# Patient Record
Sex: FEMALE | Race: WHITE | NOT HISPANIC OR LATINO | Employment: FULL TIME | URBAN - METROPOLITAN AREA
[De-identification: names, ages, dates, MRNs, and addresses within clinical notes are randomized per-mention and may not be internally consistent; named-entity substitution may affect disease eponyms.]

---

## 2017-02-11 ENCOUNTER — LAB CONVERSION - ENCOUNTER (OUTPATIENT)
Dept: OTHER | Facility: OTHER | Age: 57
End: 2017-02-11

## 2017-02-11 LAB
BASOPHILS # BLD AUTO: 0.3 %
BASOPHILS # BLD AUTO: 22 CELLS/UL (ref 0–200)
DEPRECATED RDW RBC AUTO: 14.9 % (ref 11–15)
EOSINOPHIL # BLD AUTO: 0 %
EOSINOPHIL # BLD AUTO: 0 CELLS/UL (ref 15–500)
HCT VFR BLD AUTO: 39 % (ref 35–45)
HGB BLD-MCNC: 12.7 G/DL (ref 11.7–15.5)
LYMPHOCYTES # BLD AUTO: 1728 CELLS/UL (ref 850–3900)
LYMPHOCYTES # BLD AUTO: 24 %
MCH RBC QN AUTO: 25.7 PG (ref 27–33)
MCHC RBC AUTO-ENTMCNC: 32.6 G/DL (ref 32–36)
MCV RBC AUTO: 78.8 FL (ref 80–100)
MONOCYTES # BLD AUTO: 468 CELLS/UL (ref 200–950)
MONOCYTES (HISTORICAL): 6.5 %
NEUTROPHILS # BLD AUTO: 4982 CELLS/UL (ref 1500–7800)
NEUTROPHILS # BLD AUTO: 69.2 %
PLATELET # BLD AUTO: 237 THOUSAND/UL (ref 140–400)
PMV BLD AUTO: 8.6 FL (ref 7.5–12.5)
RBC # BLD AUTO: 4.95 MILLION/UL (ref 3.8–5.1)
WBC # BLD AUTO: 7.2 THOUSAND/UL (ref 3.8–10.8)

## 2017-02-12 ENCOUNTER — LAB CONVERSION - ENCOUNTER (OUTPATIENT)
Dept: OTHER | Facility: OTHER | Age: 57
End: 2017-02-12

## 2017-02-12 LAB
A/G RATIO (HISTORICAL): 1.6 (CALC) (ref 1–2.5)
ALBUMIN SERPL BCP-MCNC: 4.5 G/DL (ref 3.6–5.1)
ALP SERPL-CCNC: 78 U/L (ref 33–130)
ALT SERPL W P-5'-P-CCNC: 10 U/L (ref 6–29)
AST SERPL W P-5'-P-CCNC: 15 U/L (ref 10–35)
BASOPHILS # BLD AUTO: 0.3 %
BASOPHILS # BLD AUTO: 22 CELLS/UL (ref 0–200)
BILIRUB SERPL-MCNC: 0.6 MG/DL (ref 0.2–1.2)
BUN SERPL-MCNC: 19 MG/DL (ref 7–25)
BUN/CREA RATIO (HISTORICAL): 18 (CALC) (ref 6–22)
CALCIUM SERPL-MCNC: 9.6 MG/DL (ref 8.6–10.4)
CHLORIDE SERPL-SCNC: 101 MMOL/L (ref 98–110)
CO2 SERPL-SCNC: 27 MMOL/L (ref 20–31)
CREAT SERPL-MCNC: 1.06 MG/DL (ref 0.5–1.05)
DEPRECATED RDW RBC AUTO: 14.9 % (ref 11–15)
EGFR AFRICAN AMERICAN (HISTORICAL): 68 ML/MIN/1.73M2
EGFR-AMERICAN CALC (HISTORICAL): 59 ML/MIN/1.73M2
EOSINOPHIL # BLD AUTO: 0 %
EOSINOPHIL # BLD AUTO: 0 CELLS/UL (ref 15–500)
GAMMA GLOBULIN (HISTORICAL): 2.9 G/DL (CALC) (ref 1.9–3.7)
GLUCOSE (HISTORICAL): 119 MG/DL (ref 65–99)
HCT VFR BLD AUTO: 39 % (ref 35–45)
HGB BLD-MCNC: 12.7 G/DL (ref 11.7–15.5)
LYMPHOCYTES # BLD AUTO: 1728 CELLS/UL (ref 850–3900)
LYMPHOCYTES # BLD AUTO: 24 %
MCH RBC QN AUTO: 25.7 PG (ref 27–33)
MCHC RBC AUTO-ENTMCNC: 32.6 G/DL (ref 32–36)
MCV RBC AUTO: 78.8 FL (ref 80–100)
MONOCYTES # BLD AUTO: 468 CELLS/UL (ref 200–950)
MONOCYTES (HISTORICAL): 6.5 %
NEUTROPHILS # BLD AUTO: 4982 CELLS/UL (ref 1500–7800)
NEUTROPHILS # BLD AUTO: 69.2 %
PLATELET # BLD AUTO: 237 THOUSAND/UL (ref 140–400)
PMV BLD AUTO: 8.6 FL (ref 7.5–12.5)
POTASSIUM SERPL-SCNC: 4.1 MMOL/L (ref 3.5–5.3)
RBC # BLD AUTO: 4.95 MILLION/UL (ref 3.8–5.1)
SODIUM SERPL-SCNC: 138 MMOL/L (ref 135–146)
TOTAL PROTEIN (HISTORICAL): 7.4 G/DL (ref 6.1–8.1)
WBC # BLD AUTO: 7.2 THOUSAND/UL (ref 3.8–10.8)

## 2017-02-13 ENCOUNTER — LAB CONVERSION - ENCOUNTER (OUTPATIENT)
Dept: OTHER | Facility: OTHER | Age: 57
End: 2017-02-13

## 2017-02-13 ENCOUNTER — GENERIC CONVERSION - ENCOUNTER (OUTPATIENT)
Dept: OTHER | Facility: OTHER | Age: 57
End: 2017-02-13

## 2017-02-13 LAB
A/G RATIO (HISTORICAL): 1.6 (CALC) (ref 1–2.5)
ALBUMIN SERPL BCP-MCNC: 4.5 G/DL (ref 3.6–5.1)
ALP SERPL-CCNC: 78 U/L (ref 33–130)
ALT SERPL W P-5'-P-CCNC: 10 U/L (ref 6–29)
AST SERPL W P-5'-P-CCNC: 15 U/L (ref 10–35)
BASOPHILS # BLD AUTO: 0.3 %
BASOPHILS # BLD AUTO: 22 CELLS/UL (ref 0–200)
BILIRUB SERPL-MCNC: 0.6 MG/DL (ref 0.2–1.2)
BUN SERPL-MCNC: 19 MG/DL (ref 7–25)
BUN/CREA RATIO (HISTORICAL): 18 (CALC) (ref 6–22)
CA 125 (HISTORICAL): 7 U/ML
CA 15-3 (HISTORICAL): 28 U/ML
CA 27.29 (HISTORICAL): 59 U/ML
CALCIUM SERPL-MCNC: 9.6 MG/DL (ref 8.6–10.4)
CHLORIDE SERPL-SCNC: 101 MMOL/L (ref 98–110)
CO2 SERPL-SCNC: 27 MMOL/L (ref 20–31)
CREAT SERPL-MCNC: 1.06 MG/DL (ref 0.5–1.05)
CREATININE, RANDOM URINE (HISTORICAL): 104 MG/DL (ref 20–320)
DEPRECATED RDW RBC AUTO: 14.9 % (ref 11–15)
EGFR AFRICAN AMERICAN (HISTORICAL): 68 ML/MIN/1.73M2
EGFR-AMERICAN CALC (HISTORICAL): 59 ML/MIN/1.73M2
EOSINOPHIL # BLD AUTO: 0 %
EOSINOPHIL # BLD AUTO: 0 CELLS/UL (ref 15–500)
GAMMA GLOBULIN (HISTORICAL): 2.9 G/DL (CALC) (ref 1.9–3.7)
GLUCOSE (HISTORICAL): 119 MG/DL (ref 65–99)
HBA1C MFR BLD HPLC: 6.1 % OF TOTAL HGB
HCT VFR BLD AUTO: 39 % (ref 35–45)
HGB BLD-MCNC: 12.7 G/DL (ref 11.7–15.5)
LYMPHOCYTES # BLD AUTO: 1728 CELLS/UL (ref 850–3900)
LYMPHOCYTES # BLD AUTO: 24 %
MAGNESIUM, UR (HISTORICAL): 0.3 MG/DL
MCH RBC QN AUTO: 25.7 PG (ref 27–33)
MCHC RBC AUTO-ENTMCNC: 32.6 G/DL (ref 32–36)
MCV RBC AUTO: 78.8 FL (ref 80–100)
MICROALBUMIN/CREATININE RATIO (HISTORICAL): 3 MCG/MG CREAT
MONOCYTES # BLD AUTO: 468 CELLS/UL (ref 200–950)
MONOCYTES (HISTORICAL): 6.5 %
NEUTROPHILS # BLD AUTO: 4982 CELLS/UL (ref 1500–7800)
NEUTROPHILS # BLD AUTO: 69.2 %
PLATELET # BLD AUTO: 237 THOUSAND/UL (ref 140–400)
PMV BLD AUTO: 8.6 FL (ref 7.5–12.5)
POTASSIUM SERPL-SCNC: 4.1 MMOL/L (ref 3.5–5.3)
RBC # BLD AUTO: 4.95 MILLION/UL (ref 3.8–5.1)
SODIUM SERPL-SCNC: 138 MMOL/L (ref 135–146)
TOTAL PROTEIN (HISTORICAL): 7.4 G/DL (ref 6.1–8.1)
WBC # BLD AUTO: 7.2 THOUSAND/UL (ref 3.8–10.8)

## 2017-02-20 ENCOUNTER — ALLSCRIPTS OFFICE VISIT (OUTPATIENT)
Dept: OTHER | Facility: OTHER | Age: 57
End: 2017-02-20

## 2017-03-03 ENCOUNTER — GENERIC CONVERSION - ENCOUNTER (OUTPATIENT)
Dept: OTHER | Facility: OTHER | Age: 57
End: 2017-03-03

## 2017-03-09 ENCOUNTER — GENERIC CONVERSION - ENCOUNTER (OUTPATIENT)
Dept: OTHER | Facility: OTHER | Age: 57
End: 2017-03-09

## 2017-05-20 ENCOUNTER — GENERIC CONVERSION - ENCOUNTER (OUTPATIENT)
Dept: OTHER | Facility: OTHER | Age: 57
End: 2017-05-20

## 2017-05-20 ENCOUNTER — LAB CONVERSION - ENCOUNTER (OUTPATIENT)
Dept: OTHER | Facility: OTHER | Age: 57
End: 2017-05-20

## 2017-05-20 DIAGNOSIS — R97.8 OTHER ABNORMAL TUMOR MARKERS: ICD-10-CM

## 2017-05-20 DIAGNOSIS — M81.0 AGE-RELATED OSTEOPOROSIS WITHOUT CURRENT PATHOLOGICAL FRACTURE: ICD-10-CM

## 2017-05-20 DIAGNOSIS — C50.919 MALIGNANT NEOPLASM OF FEMALE BREAST (HCC): ICD-10-CM

## 2017-05-20 LAB
A/G RATIO (HISTORICAL): 1.4 (CALC) (ref 1–2.5)
A/G RATIO (HISTORICAL): 1.4 (CALC) (ref 1–2.5)
ALBUMIN SERPL BCP-MCNC: 4.2 G/DL (ref 3.6–5.1)
ALBUMIN SERPL BCP-MCNC: 4.2 G/DL (ref 3.6–5.1)
ALP SERPL-CCNC: 83 U/L (ref 33–130)
ALP SERPL-CCNC: 83 U/L (ref 33–130)
ALT SERPL W P-5'-P-CCNC: 14 U/L (ref 6–29)
ALT SERPL W P-5'-P-CCNC: 14 U/L (ref 6–29)
AST SERPL W P-5'-P-CCNC: 16 U/L (ref 10–35)
AST SERPL W P-5'-P-CCNC: 16 U/L (ref 10–35)
BASOPHILS # BLD AUTO: 0.6 %
BASOPHILS # BLD AUTO: 0.6 %
BASOPHILS # BLD AUTO: 35 CELLS/UL (ref 0–200)
BASOPHILS # BLD AUTO: 35 CELLS/UL (ref 0–200)
BILIRUB SERPL-MCNC: 0.4 MG/DL (ref 0.2–1.2)
BILIRUB SERPL-MCNC: 0.4 MG/DL (ref 0.2–1.2)
BUN SERPL-MCNC: 24 MG/DL (ref 7–25)
BUN SERPL-MCNC: 24 MG/DL (ref 7–25)
BUN/CREA RATIO (HISTORICAL): 22 (CALC) (ref 6–22)
BUN/CREA RATIO (HISTORICAL): 22 (CALC) (ref 6–22)
CA 125 (HISTORICAL): 5 U/ML
CA 15-3 (HISTORICAL): 17 U/ML
CA 27.29 (HISTORICAL): 35 U/ML
CALCIUM SERPL-MCNC: 9.6 MG/DL (ref 8.6–10.4)
CALCIUM SERPL-MCNC: 9.6 MG/DL (ref 8.6–10.4)
CHLORIDE SERPL-SCNC: 100 MMOL/L (ref 98–110)
CHLORIDE SERPL-SCNC: 100 MMOL/L (ref 98–110)
CHOLEST SERPL-MCNC: 215 MG/DL (ref 125–200)
CHOLEST SERPL-MCNC: 215 MG/DL (ref 125–200)
CHOLEST/HDLC SERPL: 3.6 (CALC)
CHOLEST/HDLC SERPL: 3.6 (CALC)
CO2 SERPL-SCNC: 26 MMOL/L (ref 20–31)
CO2 SERPL-SCNC: 26 MMOL/L (ref 20–31)
CREAT SERPL-MCNC: 1.11 MG/DL (ref 0.5–1.05)
CREAT SERPL-MCNC: 1.11 MG/DL (ref 0.5–1.05)
DEPRECATED RDW RBC AUTO: 15.8 % (ref 11–15)
DEPRECATED RDW RBC AUTO: 15.8 % (ref 11–15)
EGFR AFRICAN AMERICAN (HISTORICAL): 65 ML/MIN/1.73M2
EGFR AFRICAN AMERICAN (HISTORICAL): 65 ML/MIN/1.73M2
EGFR-AMERICAN CALC (HISTORICAL): 56 ML/MIN/1.73M2
EGFR-AMERICAN CALC (HISTORICAL): 56 ML/MIN/1.73M2
EOSINOPHIL # BLD AUTO: 0 %
EOSINOPHIL # BLD AUTO: 0 %
EOSINOPHIL # BLD AUTO: 0 CELLS/UL (ref 15–500)
EOSINOPHIL # BLD AUTO: 0 CELLS/UL (ref 15–500)
GAMMA GLOBULIN (HISTORICAL): 2.9 G/DL (CALC) (ref 1.9–3.7)
GAMMA GLOBULIN (HISTORICAL): 2.9 G/DL (CALC) (ref 1.9–3.7)
GLUCOSE (HISTORICAL): 145 MG/DL (ref 65–99)
GLUCOSE (HISTORICAL): 145 MG/DL (ref 65–99)
HBA1C MFR BLD HPLC: 5.7 % OF TOTAL HGB
HBA1C MFR BLD HPLC: 5.7 % OF TOTAL HGB
HCT VFR BLD AUTO: 37.5 % (ref 35–45)
HCT VFR BLD AUTO: 37.5 % (ref 35–45)
HDLC SERPL-MCNC: 59 MG/DL
HDLC SERPL-MCNC: 59 MG/DL
HGB BLD-MCNC: 12.6 G/DL (ref 11.7–15.5)
HGB BLD-MCNC: 12.6 G/DL (ref 11.7–15.5)
LDL CHOLESTEROL (HISTORICAL): 137 MG/DL (CALC)
LDL CHOLESTEROL (HISTORICAL): 137 MG/DL (CALC)
LYMPHOCYTES # BLD AUTO: 1456 CELLS/UL (ref 850–3900)
LYMPHOCYTES # BLD AUTO: 1456 CELLS/UL (ref 850–3900)
LYMPHOCYTES # BLD AUTO: 25.1 %
LYMPHOCYTES # BLD AUTO: 25.1 %
MCH RBC QN AUTO: 26.2 PG (ref 27–33)
MCH RBC QN AUTO: 26.2 PG (ref 27–33)
MCHC RBC AUTO-ENTMCNC: 33.6 G/DL (ref 32–36)
MCHC RBC AUTO-ENTMCNC: 33.6 G/DL (ref 32–36)
MCV RBC AUTO: 78 FL (ref 80–100)
MCV RBC AUTO: 78 FL (ref 80–100)
MONOCYTES # BLD AUTO: 499 CELLS/UL (ref 200–950)
MONOCYTES # BLD AUTO: 499 CELLS/UL (ref 200–950)
MONOCYTES (HISTORICAL): 8.6 %
MONOCYTES (HISTORICAL): 8.6 %
NEUTROPHILS # BLD AUTO: 3811 CELLS/UL (ref 1500–7800)
NEUTROPHILS # BLD AUTO: 3811 CELLS/UL (ref 1500–7800)
NEUTROPHILS # BLD AUTO: 65.7 %
NEUTROPHILS # BLD AUTO: 65.7 %
NON-HDL-CHOL (CHOL-HDL) (HISTORICAL): 156 MG/DL (CALC)
NON-HDL-CHOL (CHOL-HDL) (HISTORICAL): 156 MG/DL (CALC)
PLATELET # BLD AUTO: 223 THOUSAND/UL (ref 140–400)
PLATELET # BLD AUTO: 223 THOUSAND/UL (ref 140–400)
PMV BLD AUTO: 7.8 FL (ref 7.5–12.5)
PMV BLD AUTO: 7.8 FL (ref 7.5–12.5)
POTASSIUM SERPL-SCNC: 4.4 MMOL/L (ref 3.5–5.3)
POTASSIUM SERPL-SCNC: 4.4 MMOL/L (ref 3.5–5.3)
RBC # BLD AUTO: 4.81 MILLION/UL (ref 3.8–5.1)
RBC # BLD AUTO: 4.81 MILLION/UL (ref 3.8–5.1)
SODIUM SERPL-SCNC: 136 MMOL/L (ref 135–146)
SODIUM SERPL-SCNC: 136 MMOL/L (ref 135–146)
TOTAL PROTEIN (HISTORICAL): 7.1 G/DL (ref 6.1–8.1)
TOTAL PROTEIN (HISTORICAL): 7.1 G/DL (ref 6.1–8.1)
TRIGL SERPL-MCNC: 97 MG/DL
TRIGL SERPL-MCNC: 97 MG/DL
WBC # BLD AUTO: 5.8 THOUSAND/UL (ref 3.8–10.8)
WBC # BLD AUTO: 5.8 THOUSAND/UL (ref 3.8–10.8)

## 2017-05-22 ENCOUNTER — ALLSCRIPTS OFFICE VISIT (OUTPATIENT)
Dept: OTHER | Facility: OTHER | Age: 57
End: 2017-05-22

## 2017-05-26 ENCOUNTER — GENERIC CONVERSION - ENCOUNTER (OUTPATIENT)
Dept: OTHER | Facility: OTHER | Age: 57
End: 2017-05-26

## 2017-08-28 ENCOUNTER — ALLSCRIPTS OFFICE VISIT (OUTPATIENT)
Dept: OTHER | Facility: OTHER | Age: 57
End: 2017-08-28

## 2017-08-28 ENCOUNTER — LAB CONVERSION - ENCOUNTER (OUTPATIENT)
Dept: OTHER | Facility: OTHER | Age: 57
End: 2017-08-28

## 2017-08-28 ENCOUNTER — GENERIC CONVERSION - ENCOUNTER (OUTPATIENT)
Dept: OTHER | Facility: OTHER | Age: 57
End: 2017-08-28

## 2017-08-28 LAB
A/G RATIO (HISTORICAL): 1.6 (CALC) (ref 1–2.5)
A/G RATIO (HISTORICAL): 1.6 (CALC) (ref 1–2.5)
ALBUMIN SERPL BCP-MCNC: 4.3 G/DL (ref 3.6–5.1)
ALBUMIN SERPL BCP-MCNC: 4.3 G/DL (ref 3.6–5.1)
ALP SERPL-CCNC: 77 U/L (ref 33–130)
ALP SERPL-CCNC: 77 U/L (ref 33–130)
ALT SERPL W P-5'-P-CCNC: 14 U/L (ref 6–29)
ALT SERPL W P-5'-P-CCNC: 14 U/L (ref 6–29)
AST SERPL W P-5'-P-CCNC: 16 U/L (ref 10–35)
AST SERPL W P-5'-P-CCNC: 16 U/L (ref 10–35)
BASOPHILS # BLD AUTO: 0.3 %
BASOPHILS # BLD AUTO: 0.3 %
BASOPHILS # BLD AUTO: 21 CELLS/UL (ref 0–200)
BASOPHILS # BLD AUTO: 21 CELLS/UL (ref 0–200)
BILIRUB SERPL-MCNC: 0.4 MG/DL (ref 0.2–1.2)
BILIRUB SERPL-MCNC: 0.4 MG/DL (ref 0.2–1.2)
BUN SERPL-MCNC: 24 MG/DL (ref 7–25)
BUN SERPL-MCNC: 24 MG/DL (ref 7–25)
BUN/CREA RATIO (HISTORICAL): 22 (CALC) (ref 6–22)
BUN/CREA RATIO (HISTORICAL): 22 (CALC) (ref 6–22)
CA 125 (HISTORICAL): 6 U/ML
CA 15-3 (HISTORICAL): 19 U/ML
CALCIUM SERPL-MCNC: 9.8 MG/DL (ref 8.6–10.4)
CALCIUM SERPL-MCNC: 9.8 MG/DL (ref 8.6–10.4)
CHLORIDE SERPL-SCNC: 100 MMOL/L (ref 98–110)
CHLORIDE SERPL-SCNC: 100 MMOL/L (ref 98–110)
CHOLEST SERPL-MCNC: 188 MG/DL
CHOLEST SERPL-MCNC: 188 MG/DL
CHOLEST/HDLC SERPL: 3.9 (CALC)
CHOLEST/HDLC SERPL: 3.9 (CALC)
CO2 SERPL-SCNC: 29 MMOL/L (ref 20–31)
CO2 SERPL-SCNC: 29 MMOL/L (ref 20–31)
CREAT SERPL-MCNC: 1.07 MG/DL (ref 0.5–1.05)
CREAT SERPL-MCNC: 1.07 MG/DL (ref 0.5–1.05)
DEPRECATED RDW RBC AUTO: 14 % (ref 11–15)
DEPRECATED RDW RBC AUTO: 14 % (ref 11–15)
EGFR AFRICAN AMERICAN (HISTORICAL): 68 ML/MIN/1.73M2
EGFR AFRICAN AMERICAN (HISTORICAL): 68 ML/MIN/1.73M2
EGFR-AMERICAN CALC (HISTORICAL): 58 ML/MIN/1.73M2
EGFR-AMERICAN CALC (HISTORICAL): 58 ML/MIN/1.73M2
EOSINOPHIL # BLD AUTO: 0 %
EOSINOPHIL # BLD AUTO: 0 %
EOSINOPHIL # BLD AUTO: 0 CELLS/UL (ref 15–500)
EOSINOPHIL # BLD AUTO: 0 CELLS/UL (ref 15–500)
GAMMA GLOBULIN (HISTORICAL): 2.7 G/DL (CALC) (ref 1.9–3.7)
GAMMA GLOBULIN (HISTORICAL): 2.7 G/DL (CALC) (ref 1.9–3.7)
GLUCOSE (HISTORICAL): 137 MG/DL (ref 65–99)
GLUCOSE (HISTORICAL): 137 MG/DL (ref 65–99)
HBA1C MFR BLD HPLC: 5.9 % OF TOTAL HGB
HBA1C MFR BLD HPLC: 5.9 % OF TOTAL HGB
HCT VFR BLD AUTO: 39.8 % (ref 35–45)
HCT VFR BLD AUTO: 39.8 % (ref 35–45)
HDLC SERPL-MCNC: 48 MG/DL
HDLC SERPL-MCNC: 48 MG/DL
HGB BLD-MCNC: 13 G/DL (ref 11.7–15.5)
HGB BLD-MCNC: 13 G/DL (ref 11.7–15.5)
LDL CHOLESTEROL (HISTORICAL): 111 MG/DL (CALC)
LDL CHOLESTEROL (HISTORICAL): 111 MG/DL (CALC)
LYMPHOCYTES # BLD AUTO: 1605 CELLS/UL (ref 850–3900)
LYMPHOCYTES # BLD AUTO: 1605 CELLS/UL (ref 850–3900)
LYMPHOCYTES # BLD AUTO: 22.6 %
LYMPHOCYTES # BLD AUTO: 22.6 %
MCH RBC QN AUTO: 26.3 PG (ref 27–33)
MCH RBC QN AUTO: 26.3 PG (ref 27–33)
MCHC RBC AUTO-ENTMCNC: 32.7 G/DL (ref 32–36)
MCHC RBC AUTO-ENTMCNC: 32.7 G/DL (ref 32–36)
MCV RBC AUTO: 80.6 FL (ref 80–100)
MCV RBC AUTO: 80.6 FL (ref 80–100)
MONOCYTES # BLD AUTO: 582 CELLS/UL (ref 200–950)
MONOCYTES # BLD AUTO: 582 CELLS/UL (ref 200–950)
MONOCYTES (HISTORICAL): 8.2 %
MONOCYTES (HISTORICAL): 8.2 %
NEUTROPHILS # BLD AUTO: 4892 CELLS/UL (ref 1500–7800)
NEUTROPHILS # BLD AUTO: 4892 CELLS/UL (ref 1500–7800)
NEUTROPHILS # BLD AUTO: 68.9 %
NEUTROPHILS # BLD AUTO: 68.9 %
NON-HDL-CHOL (CHOL-HDL) (HISTORICAL): 140 MG/DL (CALC)
NON-HDL-CHOL (CHOL-HDL) (HISTORICAL): 140 MG/DL (CALC)
PLATELET # BLD AUTO: 207 THOUSAND/UL (ref 140–400)
PLATELET # BLD AUTO: 207 THOUSAND/UL (ref 140–400)
PMV BLD AUTO: 9.9 FL (ref 7.5–12.5)
PMV BLD AUTO: 9.9 FL (ref 7.5–12.5)
POTASSIUM SERPL-SCNC: 4.7 MMOL/L (ref 3.5–5.3)
POTASSIUM SERPL-SCNC: 4.7 MMOL/L (ref 3.5–5.3)
RBC # BLD AUTO: 4.94 MILLION/UL (ref 3.8–5.1)
RBC # BLD AUTO: 4.94 MILLION/UL (ref 3.8–5.1)
SODIUM SERPL-SCNC: 140 MMOL/L (ref 135–146)
SODIUM SERPL-SCNC: 140 MMOL/L (ref 135–146)
TOTAL PROTEIN (HISTORICAL): 7 G/DL (ref 6.1–8.1)
TOTAL PROTEIN (HISTORICAL): 7 G/DL (ref 6.1–8.1)
TRIGL SERPL-MCNC: 171 MG/DL
TRIGL SERPL-MCNC: 171 MG/DL
WBC # BLD AUTO: 7.1 THOUSAND/UL (ref 3.8–10.8)
WBC # BLD AUTO: 7.1 THOUSAND/UL (ref 3.8–10.8)

## 2017-08-29 ENCOUNTER — LAB CONVERSION - ENCOUNTER (OUTPATIENT)
Dept: OTHER | Facility: OTHER | Age: 57
End: 2017-08-29

## 2017-08-29 ENCOUNTER — GENERIC CONVERSION - ENCOUNTER (OUTPATIENT)
Dept: OTHER | Facility: OTHER | Age: 57
End: 2017-08-29

## 2017-08-29 LAB
CA 125 (HISTORICAL): 6 U/ML
CA 15-3 (HISTORICAL): 19 U/ML
CA 27.29 (HISTORICAL): 27 U/ML

## 2017-09-22 DIAGNOSIS — R97.8 OTHER ABNORMAL TUMOR MARKERS: ICD-10-CM

## 2017-09-22 DIAGNOSIS — C50.919 MALIGNANT NEOPLASM OF FEMALE BREAST (HCC): ICD-10-CM

## 2017-10-19 ENCOUNTER — GENERIC CONVERSION - ENCOUNTER (OUTPATIENT)
Dept: OTHER | Facility: OTHER | Age: 57
End: 2017-10-19

## 2017-11-11 ENCOUNTER — ALLSCRIPTS OFFICE VISIT (OUTPATIENT)
Dept: OTHER | Facility: OTHER | Age: 57
End: 2017-11-11

## 2017-11-13 NOTE — PROGRESS NOTES
Assessment    1  Epigastric pain (789 06) (R10 13)   2  Abdominal bloating (787 3) (R14 0)   3  Never a smoker   4  BMI 22 0-22 9, adult (V85 1) (Z68 22)   5  Type 2 diabetes with ophthalmic manifestation (250 50) (E11 39)    Plan  Abdominal bloating, Epigastric pain    · (1) AMYLASE; Status:Active; Requested for:11Nov2017;    · (1) COMPREHENSIVE METABOLIC PANEL; Status:Active; Requested for:11Nov2017;    · (1) LIPASE; Status:Active; Requested for:11Nov2017;   Epigastric pain    · Omeprazole 40 MG Oral Capsule Delayed Release; TAKE 1 CAPSULE Daily 1hour prior to meal    Discussion/Summary    Labs ordered  Will trial Omeprazole  Consider abdominal US if pain persists  GI if no improvement, also due for colonoscopy  Advised on bland diet and dietary restrictions  Possible side effects of new medications were reviewed with the patient/guardian today  The treatment plan was reviewed with the patient/guardian  The patient/guardian understands and agrees with the treatment plan      Chief Complaint  ptc/o stomach pain on and off since Thursday  ac/ma      History of Present Illness  HPI: She has been experiencing epigastric pain and upper stomach bloating for the past couple of months  has been experiencing horrible pains in the same area for the past 2-3 days  They come and go, made worse with hunger  Denies fevers, n/v/d, changes in bowel habits  She has been eating tomato sauce more frequently, and wonder if this is contributing to symptoms  on Prilosec years ago for reflux symptoms, had upper GI series at this time  about pancreatitis given DM medications  been taking Mylanta and Tums which do help  for colonoscopy with Dr Leesa Morillo days/week      Review of Systems   Constitutional: no fever-- and-- no chills  Cardiovascular: no chest pain  Respiratory: no shortness of breath-- and-- no cough  Gastrointestinal: abdominal pain, but-- no nausea,-- no vomiting,-- no diarrhea-- and-- no blood in stools        Active Problems  1  Abnormal tumor markers (795 89) (R97 8)   2  Adenocarcinoma of breast, unspecified laterality (174 9) (C50 919)   3  Allergic rhinitis (477 9) (J30 9)   4  Anemia (285 9) (D64 9)   5  Anxiety (300 00) (F41 9)   6  Benign essential hypertension (401 1) (I10)   7  Breast cancer (174 9) (C50 919)   8  Diabetic Cataract (366 41)   9  Diabetic retinopathy (250 50,362 01) (E11 319)   10  Encounter for screening colonoscopy (V76 51) (Z12 11)   11  Ganglion of right wrist (727 41) (M67 431)   12  H/O mastectomy (V45 71) (Z90 10)   13  Mixed hyperlipidemia (272 2) (E78 2)   14  On angiotensin-converting enzyme (ACE) inhibitors (V07 52) (Z79 899)   15  Osteoporosis (733 00) (M81 0)   16  Palpitations (785 1) (R00 2)   17  Pruritus (698 9) (L29 9)   18  Refusal of statin medication by patient (V64 2) (Z53 29)   19  Type 2 diabetes with ophthalmic manifestation (250 50) (E11 39)   20  Varicose veins without complication (294 1) (C05 41)   21  Well adult on routine health check (V70 0) (Z00 00)    Past Medical History  1  History of Abnormal Liver Function Test (790 6)   2  Acute upper respiratory infection (465 9) (J06 9)   3  History of Acute upper respiratory infection (465 9) (J06 9)   4  History of Breast Cancer (V10 3)   5  History of Cough (786 2) (R05)   6  History of Diabetes Mellitus (250 00)   7  History of acute sinusitis (V12 69) (Z87 09)   8  History of diarrhea (V12 79) (Z87 898)   9  History of hypertension (V12 59) (Z86 79)  Active Problems And Past Medical History Reviewed: The active problems and past medical history were reviewed and updated today  Family History  Father    1  Family history of hypertension (V17 49) (Z82 49)  Family History    2  Family history of Diabetes Mellitus (V18 0)   3  Family history of Heart Disease (V17 49)    Social History   · Being A Social Drinker   · Never a smoker  The social history was reviewed and is unchanged  Surgical History    1   History of Breast Surgery Lumpectomy   2  History of Breast Surgery Mastectomy   3  History of Oral Surgery Tooth Extraction  Surgical History Reviewed: The surgical history was reviewed and updated today  Current Meds   1  Amlodipine-Olmesartan 5-40 MG Oral Tablet; take  a half tablet once a day; Therapy: 72TUW7244 to (Evaluate:95Lsz2781)  Requested for: 32Mxj0415; Last Rx:82Jch5465 Ordered   2  Aspirin 81 MG TABS; 1 every day; Therapy: 22FUM4685 to  Requested for: 28Aug2017 Recorded   3  BL Glucosamine-Chondroitin TABS; Therapy: (Recorded:11Nov2017) to Recorded   4  Bystolic 5 MG Oral Tablet; 1 every day; Therapy: 87AHU9308 to (Evaluate:24Feb2018)  Requested for: 28Aug2017; Last Rx:25Xwj8022 Ordered   5  Fluticasone Propionate 50 MCG/ACT Nasal Suspension; USE 2 SPRAYS IN EACH NOSTRIL DAILY; Therapy: 87IMG6544 to (Last Rx:02Nov2017)  Requested for: 15KFE0378 Ordered   6  Glucocard Vital Test In Vitro Strip; TEST 5 TIMES DAILY; Therapy: 88IKZ6689 to (Last Rx:10Oct2017)  Requested for: 06Uob6075 Ordered   7  Kombiglyze XR 5-1000 MG Oral Tablet Extended Release 24 Hour; Take 1 tablet by mouth  every day; Therapy: 31WOB7624 to (Evaluate:24Feb2018)  Requested for: 64Ffy4795; Last Rx:72Auk7936 Ordered   8  Letrozole 2 5 MG Oral Tablet; Take 1 tablet daily  Requested for: 67OBV4717; Last Rx:47Hrn8273; Status: ACTIVE - Retrospective By Protocol Authorization Ordered   9  Multivitamins TABS; 1 Every Day; Therapy: 58WRX1232 to  Requested for: 14OZA9763 Recorded   10  Pravastatin Sodium 10 MG Oral Tablet; TAKE 1 TABLET DAILY; Therapy: 61ECQ7400 to (Evaluate:24Feb2018)  Requested for: 60Bze9609; Last  Rx:28Aug2017 Ordered   11  Probiotic Daily CAPS; Therapy: (Recorded:16Nov2014) to Recorded    The medication list was reviewed and updated today  Allergies  1  Codeine Derivatives   2  Erythromycin TABS   3  Penicillins   4   Sulfa Drugs    Vitals   Recorded: 93RQC2266 11:42AM   Temperature 97 6 F   Heart Rate 76   Respiration 16   Systolic 367   Diastolic 74   Height 5 ft 3 in   Weight 125 lb    BMI Calculated 22 14   BSA Calculated 1 58       Physical Exam   Constitutional  General appearance: No acute distress, well appearing and well nourished  Eyes  Conjunctiva and lids: No swelling, erythema or discharge  Ears, Nose, Mouth, and Throat  Otoscopic examination: Tympanic membranes translucent with normal light reflex  Canals patent without erythema  Nasal mucosa, septum, and turbinates: Normal without edema or erythema  Oropharynx: Normal with no erythema, edema, exudate or lesions  Pulmonary  Respiratory effort: No increased work of breathing or signs of respiratory distress  Auscultation of lungs: Clear to auscultation  Cardiovascular  Auscultation of heart: Normal rate and rhythm, normal S1 and S2, without murmurs  Examination of extremities for edema and/or varicosities: Normal    Abdomen  Abdomen: Abnormal   The abdomen was flat  Bowel sounds were normal  There was mild tenderness in the epigastric area  Liver and spleen: No hepatomegaly or splenomegaly  Lymphatic  Palpation of lymph nodes in neck: No lymphadenopathy  Skin  Skin and subcutaneous tissue: Normal without rashes or lesions  Psychiatric  Mood and affect: Normal          Attending Note  Collaborating Physician Note: Collaborating Note: I agree with the Advanced Practitioner note        Future Appointments    Date/Time Provider Specialty Site   01/29/2018 02:00 PM Shannan Higgins[address]   01/29/2018 03:30 PM Deborah Shay MD Hematology Oncology [facility]       Signatures   Electronically signed by : Elysia Milan; Nov 11 2017 12:11PM EST                       (Author)    Electronically signed by : Raissa Minor DO; Nov 13 2017 12:12AM EST                       (Author)

## 2017-11-16 ENCOUNTER — LAB CONVERSION - ENCOUNTER (OUTPATIENT)
Dept: OTHER | Facility: OTHER | Age: 57
End: 2017-11-16

## 2017-11-16 DIAGNOSIS — R14.0 ABDOMINAL DISTENSION (GASEOUS): ICD-10-CM

## 2017-11-16 DIAGNOSIS — R10.13 EPIGASTRIC PAIN: ICD-10-CM

## 2017-11-16 LAB
A/G RATIO (HISTORICAL): 1.6 (CALC) (ref 1–2.5)
ALBUMIN SERPL BCP-MCNC: 4.4 G/DL (ref 3.6–5.1)
ALP SERPL-CCNC: 81 U/L (ref 33–130)
ALT SERPL W P-5'-P-CCNC: 14 U/L (ref 6–29)
AMYLASE (HISTORICAL): 59 U/L (ref 21–101)
AST SERPL W P-5'-P-CCNC: 16 U/L (ref 10–35)
BILIRUB SERPL-MCNC: 0.3 MG/DL (ref 0.2–1.2)
BUN SERPL-MCNC: 21 MG/DL (ref 7–25)
BUN/CREA RATIO (HISTORICAL): 19 (CALC) (ref 6–22)
CALCIUM SERPL-MCNC: 9.6 MG/DL (ref 8.6–10.4)
CHLORIDE SERPL-SCNC: 100 MMOL/L (ref 98–110)
CO2 SERPL-SCNC: 28 MMOL/L (ref 20–31)
CREAT SERPL-MCNC: 1.08 MG/DL (ref 0.5–1.05)
EGFR AFRICAN AMERICAN (HISTORICAL): 66 ML/MIN/1.73M2
EGFR-AMERICAN CALC (HISTORICAL): 57 ML/MIN/1.73M2
GAMMA GLOBULIN (HISTORICAL): 2.8 G/DL (CALC) (ref 1.9–3.7)
GLUCOSE (HISTORICAL): 125 MG/DL (ref 65–99)
LIPASE SERPL-CCNC: 73 U/L (ref 7–60)
POTASSIUM SERPL-SCNC: 4.6 MMOL/L (ref 3.5–5.3)
SODIUM SERPL-SCNC: 137 MMOL/L (ref 135–146)
TOTAL PROTEIN (HISTORICAL): 7.2 G/DL (ref 6.1–8.1)

## 2017-11-25 ENCOUNTER — HOSPITAL ENCOUNTER (OUTPATIENT)
Dept: ULTRASOUND IMAGING | Facility: HOSPITAL | Age: 57
Discharge: HOME | End: 2017-11-25
Payer: COMMERCIAL

## 2017-11-25 DIAGNOSIS — R10.13 EPIGASTRIC PAIN: ICD-10-CM

## 2017-11-25 DIAGNOSIS — R14.0 ABDOMINAL DISTENSION (GASEOUS): ICD-10-CM

## 2017-11-25 PROCEDURE — 76700 US EXAM ABDOM COMPLETE: CPT

## 2017-11-28 ENCOUNTER — GENERIC CONVERSION - ENCOUNTER (OUTPATIENT)
Dept: OTHER | Facility: OTHER | Age: 57
End: 2017-11-28

## 2018-01-09 NOTE — RESULT NOTES
Discussion/Summary   Saida- please schedule your ultrasound and have labs repeated in January  DELFINA Mcraelillie     Verified Results  (1) COMPREHENSIVE METABOLIC PANEL 39SFQ2751 56:50VZ Grace Alexander     Test Name Result Flag Reference   GLUCOSE 125 mg/dL H 65-99   Fasting reference interval     For someone without known diabetes, a glucose value  between 100 and 125 mg/dL is consistent with  prediabetes and should be confirmed with a  follow-up test    UREA NITROGEN (BUN) 21 mg/dL  7-25   CREATININE 1 08 mg/dL H 0 50-1 05   For patients >52years of age, the reference limit  for Creatinine is approximately 13% higher for people  identified as -American  eGFR NON-AFR  AMERICAN 57 mL/min/1 73m2 L > OR = 60   eGFR AFRICAN AMERICAN 66 mL/min/1 73m2  > OR = 60   BUN/CREATININE RATIO 19 (calc)  6-22   SODIUM 137 mmol/L  135-146   POTASSIUM 4 6 mmol/L  3 5-5 3   CHLORIDE 100 mmol/L     CARBON DIOXIDE 28 mmol/L  20-31   CALCIUM 9 6 mg/dL  8 6-10 4   PROTEIN, TOTAL 7 2 g/dL  6 1-8 1   ALBUMIN 4 4 g/dL  3 6-5 1   GLOBULIN 2 8 g/dL (calc)  1 9-3 7   ALBUMIN/GLOBULIN RATIO 1 6 (calc)  1 0-2 5   BILIRUBIN, TOTAL 0 3 mg/dL  0 2-1 2   ALKALINE PHOSPHATASE 81 U/L     AST 16 U/L  10-35   ALT 14 U/L  6-29     (1) LIPASE 27RTU2885 10:00AM Grace Alexander   REPORT COMMENT:  FASTING:NO     Test Name Result Flag Reference   LIPASE 73 U/L H 7-60     (Q) AMYLASE 59YAC8408 10:00AM Grace Alexander     Test Name Result Flag Reference   AMYLASE 59 U/L         Plan  Abdominal bloating, Epigastric pain    · * US ABDOMEN COMPLETE; Status:Hold For - Scheduling; Requested for:16Nov2017;    · (1) AMYLASE; Status:Hold For - Exact Date; Requested for:After 58AKY9428;    · (1) LIPASE; Status:Hold For - Exact Date;  Requested for:After 04OIN3951;     Signatures   Electronically signed by : Shahida Hutchins; Nov 16 2017 12:04PM EST                       (Author)

## 2018-01-10 NOTE — PROGRESS NOTES
Assessment    1  Encounter for preventive health examination (V70 0) (Z00 00)   2  Type 2 diabetes with ophthalmic manifestation (250 50) (E11 39)   3  Benign essential hypertension (401 1) (I10)   4  Mixed hyperlipidemia (272 2) (E78 2)   5  Never a smoker   6  Anemia (285 9) (D64 9)   7  Adenocarcinoma of breast, unspecified laterality (174 9) (C50 919)   8  On angiotensin-converting enzyme (ACE) inhibitors (V07 52) (Z79 899)    Plan  Adenocarcinoma of breast, unspecified laterality, Anemia, Benign essential  hypertension, Mixed hyperlipidemia, SocHx: Never a smoker, Type 2 diabetes  with ophthalmic manifestation    · Follow-up visit in 4 Months Evaluation and Treatment  Follow-up  Status: Hold For -  Scheduling  Requested for: 00Upw5697   · (1) CBC/PLT/DIFF; Status:Active; Requested DPH:67FDP9671;    · (1) COMPREHENSIVE METABOLIC PANEL; Status:Active; Requested EULALIO:72QIC4030;    · (1) HEMOGLOBIN A1C; Status:Active; Requested ZG82YSG2116;    · (Q) MICROALBUMIN, RANDOM URINE (W/CREATININE); Status:Active; Requested  LGX:69GUB5110; Benign essential hypertension    · Amlodipine-Olmesartan 5-40 MG Oral Tablet (Justice); take  a half tablet once a  day   · Bystolic 5 MG Oral Tablet; 1 every day  Health Maintenance    · Aspirin 81 MG TABS; 1 every day  Type 2 diabetes with ophthalmic manifestation    · Kombiglyze XR 5-1000 MG Oral Tablet Extended Release 24 Hour; Take 1  tablet by mouth  every day   · Pravastatin Sodium 10 MG Oral Tablet; TAKE 1 TABLET DAILY    Discussion/Summary    Pt is doing well  will get foot exam from podiatry  will follow  Chief Complaint  cpe      History of Present Illness  HM, Adult Female: The patient is being seen for a health maintenance evaluation     General Health:   Screening:   HPI: Pt is here for a physical    pt just went to the foot [facility] - Dr Jennifer Duran  pt was seen feb at eye doc      Review of Systems    Constitutional: No fever, no chills, feels well, no tiredness, no recent weight gain or weight loss  Eyes: No complaints of eye pain, no red eyes, no eyesight problems, no discharge, no dry eyes, no itching of eyes  ENT: no complaints of earache, no loss of hearing, no nose bleeds, no nasal discharge, no sore throat, no hoarseness  Cardiovascular: No complaints of slow heart rate, no fast heart rate, no chest pain, no palpitations, no leg claudication, no lower extremity edema  Respiratory: No complaints of shortness of breath, no wheezing, no cough, no SOB on exertion, no orthopnea, no PND  Gastrointestinal: No complaints of abdominal pain, no constipation, no nausea or vomiting, no diarrhea, no bloody stools  Genitourinary: No complaints of dysuria, no incontinence, no pelvic pain, no dysmenorrhea, no vaginal discharge or bleeding  Musculoskeletal: No complaints of arthralgias, no myalgias, no joint swelling or stiffness, no limb pain or swelling  Integumentary: No complaints of skin rash or lesions, no itching, no skin wounds, no breast pain or lump  Neurological: No complaints of headache, no confusion, no convulsions, no numbness, no dizziness or fainting, no tingling, no limb weakness, no difficulty walking  Psychiatric: Not suicidal, no sleep disturbance, no anxiety or depression, no change in personality, no emotional problems  Endocrine: No complaints of proptosis, no hot flashes, no muscle weakness, no deepening of the voice, no feelings of weakness  Hematologic/Lymphatic: No complaints of swollen glands, no swollen glands in the neck, does not bleed easily, does not bruise easily  Active Problems    1  Abnormal tumor markers (795 89) (R97 8)   2  Adenocarcinoma of breast, unspecified laterality (174 9) (C50 919)   3  Allergic rhinitis (477 9) (J30 9)   4  Anemia (285 9) (D64 9)   5  Anxiety (300 00) (F41 9)   6  Benign essential hypertension (401 1) (I10)   7  Breast cancer (174 9) (C50 919)   8   Diabetic Cataract (366 41)   9  Diabetic retinopathy (250 50,362 01) (E11 319)   10  Encounter for screening colonoscopy (V76 51) (Z12 11)   11  Ganglion of right wrist (727 41) (M67 431)   12  H/O mastectomy (V45 71) (Z90 10)   13  Mixed hyperlipidemia (272 2) (E78 2)   14  Must sit up to breath (786 02) (R06 01)   15  Osteoporosis (733 00) (M81 0)   16  Palpitations (785 1) (R00 2)   17  Pruritus (698 9) (L29 9)   18  Refusal of statin medication by patient (V64 2) (Z53 29)   19  Travel sickness (994 6) (T75 3XXA)   20  Type 2 diabetes with ophthalmic manifestation (250 50) (E11 39)   21  Varicose veins without complication (735 0) (A81 21)   22  Well adult on routine health check (V70 0) (Z00 00)    Past Medical History    · History of Abnormal Liver Function Test (790 6)   · Acute upper respiratory infection (465 9) (J06 9)   · History of Acute upper respiratory infection (465 9) (J06 9)   · History of Breast Cancer (V10 3)   · History of Cough (786 2) (R05)   · History of Diabetes Mellitus (250 00)   · History of acute sinusitis (V12 69) (Z87 09)   · History of diarrhea (V12 79) (Z87 898)   · History of hypertension (V12 59) (Z86 79)    Surgical History    · History of Breast Surgery Lumpectomy   · History of Breast Surgery Mastectomy   · History of Oral Surgery Tooth Extraction    Family History  Father    · Family history of hypertension (V17 49) (Z82 49)  Family History    · Family history of Diabetes Mellitus (V18 0)   · Family history of Heart Disease (V17 49)    Social History    · Being A Social Drinker   · Never a smoker    Current Meds   1  Amlodipine-Olmesartan 5-40 MG Oral Tablet; take  a half tablet once a day; Therapy: 12ZMK8805 to (Evaluate:18Nov2017)  Requested for: 43KDG6635 Recorded   2  Aspirin 81 MG TABS; 1 every day; Therapy: 79CDA7431 to  Requested for: 56Ask0855 Recorded   3  Bystolic 5 MG Oral Tablet; 1 every day;    Therapy: 51LHK3042 to (Evaluate:71Eql1879)  Requested for: 57FIK7650; Last Rx: 82GIO1307 Ordered   4  Fluticasone Propionate 50 MCG/ACT Nasal Suspension; 2 puffs/nostril/day; Therapy: 21TDT2686 to (Last Rx:22May2017)  Requested for: 72GAO5267 Ordered   5  Glucocard Vital Test In Vitro Strip; TEST 5 TIMES DAILY; Therapy: 97MJD2477 to (Last Rx:01Mar2017)  Requested for: 45ZLC8374 Ordered   6  Kombiglyze XR 5-1000 MG Oral Tablet Extended Release 24 Hour; Take 1 tablet by   mouth  every day; Therapy: 65EFW3121 to ([facility])  Requested for: 35QPH1942; Last   Rx:09Mar2017 Ordered   7  Letrozole 2 5 MG Oral Tablet; Take 1 tablet daily  Requested for: 43VSS3053; Last   Rx:22May2017; Status: ACTIVE - Retrospective By Protocol Authorization Ordered   8  Multivitamins TABS; 1 Every Day; Therapy: 00VMO7715 to  Requested for: 52HTW3205 Recorded   9  Pravastatin Sodium 10 MG Oral Tablet; TAKE 1 TABLET DAILY; Therapy: 02HFJ1951 to (Evaluate:18Nov2017)  Requested for: 59FBS5275; Last   Rx:22May2017 Ordered   10  Probiotic Daily CAPS; Therapy: (Recorded:16Nov2014) to Recorded    Allergies    1  Codeine Derivatives   2  Erythromycin TABS   3  Penicillins   4  Sulfa Drugs    Vitals   Recorded: 64Tcl1415 04:15PM   Temperature 98 F   Heart Rate 72   Respiration 16   Systolic 766   Diastolic 68   Height 5 ft 3 in   Weight 124 lb    BMI Calculated 21 97   BSA Calculated 1 58     Physical Exam    Constitutional   General appearance: No acute distress, well appearing and well nourished  Head and Face   Head and face: Normal     Palpation of the face and sinuses: No sinus tenderness  Eyes   Conjunctiva and lids: No swelling, erythema or discharge  Pupils and irises: Equal, round, reactive to light  Ophthalmoscopic examination: Normal fundi and optic discs  Ears, Nose, Mouth, and Throat   External inspection of ears and nose: Normal     Otoscopic examination: Tympanic membranes translucent with normal light reflex  Canals patent without erythema      Neck   Thyroid: Normal, no thyromegaly  Pulmonary   Respiratory effort: No increased work of breathing or signs of respiratory distress  Auscultation of lungs: Clear to auscultation  Cardiovascular   Auscultation of heart: Normal rate and rhythm, normal S1 and S2, no murmurs  Abdomen   Abdomen: Non-tender, no masses  Liver and spleen: No hepatomegaly or splenomegaly  Lymphatic   Palpation of lymph nodes in neck: No lymphadenopathy  Palpation of lymph nodes in axillae: No lymphadenopathy  Palpation of lymph nodes in groin: No lymphadenopathy  Palpation of lymph nodes in other areas: No lymphadenopathy  Musculoskeletal   Gait and station: Normal     Digits and nails: Normal without clubbing or cyanosis  Joints, bones, and muscles: Normal     Range of motion: Normal        Results/Data  (1)  85Vev0547 07:09AM Rosa Munguia E   REPORT COMMENT:  REQ 2 OF 2  FASTING:NO     Test Name Result Flag Reference    6 U/mL  <35   This test was performed using the Julianna Michael  Chemiluminescent method  Values obtained from  different assay methods cannot be used  interchangeably   levels, regardless of  value, should not be interpreted as absolute  evidence of the presence or absence of disease       (1) CBC/PLT/DIFF 36Bsz7463 07:05AM Debbie Gilmore     Test Name Result Flag Reference   WHITE BLOOD CELL COUNT 7 1 Thousand/uL  3 8-10 8   RED BLOOD CELL COUNT 4 94 Million/uL  3 80-5 10   HEMOGLOBIN 13 0 g/dL  11 7-15 5   HEMATOCRIT 39 8 %  35 0-45 0   MCV 80 6 fL  80 0-100 0   MCH 26 3 pg L 27 0-33 0   MCHC 32 7 g/dL  32 0-36 0   RDW 14 0 %  11 0-15 0   PLATELET COUNT 665 Thousand/uL  140-400   ABSOLUTE NEUTROPHILS 4892 cells/uL  0350-8576   ABSOLUTE LYMPHOCYTES 1605 cells/uL  850-3900   ABSOLUTE MONOCYTES 582 cells/uL  200-950   ABSOLUTE EOSINOPHILS 0 cells/uL L    ABSOLUTE BASOPHILS 21 cells/uL  0-200   NEUTROPHILS 68 9 %     LYMPHOCYTES 22 6 %     MONOCYTES 8 2 %     EOSINOPHILS 0 0 %     BASOPHILS 0 3 %     MPV 9 9 fL  7 5-12 5     (1) COMPREHENSIVE METABOLIC PANEL 36JLV3289 28:30WF [facility]     Test Name Result Flag Reference   GLUCOSE 137 mg/dL H 65-99   Fasting reference interval     For someone without known diabetes, a glucose  value >125 mg/dL indicates that they may have  diabetes and this should be confirmed with a  follow-up test    UREA NITROGEN (BUN) 24 mg/dL  7-25   CREATININE 1 07 mg/dL H 0 50-1 05   For patients >52years of age, the reference limit  for Creatinine is approximately 13% higher for people  identified as -American  eGFR NON-AFR  AMERICAN 58 mL/min/1 73m2 L > OR = 60   eGFR AFRICAN AMERICAN 68 mL/min/1 73m2  > OR = 60   BUN/CREATININE RATIO 22 (calc)  6-22   SODIUM 140 mmol/L  135-146   POTASSIUM 4 7 mmol/L  3 5-5 3   CHLORIDE 100 mmol/L     CARBON DIOXIDE 29 mmol/L  20-31   CALCIUM 9 8 mg/dL  8 6-10 4   PROTEIN, TOTAL 7 0 g/dL  6 1-8 1   ALBUMIN 4 3 g/dL  3 6-5 1   GLOBULIN 2 7 g/dL (calc)  1 9-3 7   ALBUMIN/GLOBULIN RATIO 1 6 (calc)  1 0-2 5   BILIRUBIN, TOTAL 0 4 mg/dL  0 2-1 2   ALKALINE PHOSPHATASE 77 U/L     AST 16 U/L  10-35   ALT 14 U/L  6-29     (1) LIPID PANEL, FASTING 94Laf5561 07:05AM [facility]     Test Name Result Flag Reference   CHOLESTEROL, TOTAL 188 mg/dL  <200   HDL CHOLESTEROL 48 mg/dL L >55   TRIGLICERIDES 994 mg/dL H <150   LDL-CHOLESTEROL 111 mg/dL (calc) H    Reference range: <100     Desirable range <100 mg/dL for patients with CHD or  diabetes and <70 mg/dL for diabetic patients with  known heart disease  The [place]-Caicedo calculation is a validated novel   method that provides better accuracy than the   Friedewald equation in the estimation of LDL-C,   particularly when TG levels are 150-400 mg/dL and   LDL-C levels are lower than 70 mg/dL    Reference:  Parvez Aviles et al  Comparison of a Novel   Method vs the Friedewald Equation for Estimating   Low-Density Lipoprotein Cholesterol Levels From the   Standard Lipid Profile  DARIAN  4483;404(46): 9497-2503  For additional information, please refer to  http://[employer]/faq/MSX173  (This link is being provided for informational/  educational purposes only )   CHOL/HDLC RATIO 3 9 (calc)  <5 0   NON HDL CHOLESTEROL 140 mg/dL (calc) H <130   For patients with diabetes plus 1 major ASCVD risk   factor, treating to a non-HDL-C goal of <100 mg/dL   (LDL-C of <70 mg/dL) is considered a therapeutic   option  (Q) HEMOGLOBIN A1c 09Ehx8090 07:05AM Kody Upton   REPORT COMMENT:  REQ 1 OF 2  FASTING:YES     Test Name Result Flag Reference   HEMOGLOBIN A1c 5 9 % of total Hgb H <5 7   For someone without known diabetes, a hemoglobin   A1c value between 5 7% and 6 4% is consistent with  prediabetes and should be confirmed with a   follow-up test      For someone with known diabetes, a value <7%  indicates that their diabetes is well controlled  A1c  targets should be individualized based on duration of  diabetes, age, comorbid conditions, and other  considerations  This assay result is consistent with an increased risk  of diabetes  Currently, no consensus exists regarding use of  hemoglobin A1c for diagnosis of diabetes for children  Procedure    Procedure: Indication: routine screening  Results: 20/20 in both eyes without corrective device, 20/20 in the right eye without corrective device, 20/20 in the left eye without corrective device   Color vision was and the results were normal       Health Management  Type 2 diabetes with ophthalmic manifestation   *VB - Eye Exam; every 1 year; Last 00Ffm9324; Next Due: 29TJC7054; Active  *VB - Foot Exam; every 1 year; Last 72Jzp9922; Next Due: 53Hiu0335;  Active    Future Appointments    Date/Time Provider Specialty Site   02/26/2018 03:45 PM Valente Vergara MD Hematology [facility]     Signatures   Electronically signed by : Henri Mckay DO; Aug 28 2017  4:39PM EST (Author)

## 2018-01-10 NOTE — RESULT NOTES
Discussion/Summary   will discuss labs at follow up appt     Verified Results  (1) CBC/PLT/DIFF [id number] 07:02AM Campbell Butter     Test Name Result Flag Reference   WHITE BLOOD CELL COUNT 5 8 Thousand/uL  3 8-10 8   RED BLOOD CELL COUNT 4 81 Million/uL  3 80-5 10   HEMOGLOBIN 12 6 g/dL  11 7-15 5   HEMATOCRIT 37 5 %  35 0-45 0   MCV 78 0 fL L 80 0-100 0   MCH 26 2 pg L 27 0-33 0   MCHC 33 6 g/dL  32 0-36 0   RDW 15 8 % H 11 0-15 0   PLATELET COUNT 456 Thousand/uL  140-400   ABSOLUTE NEUTROPHILS 3811 cells/uL  7900-4383   ABSOLUTE LYMPHOCYTES 1456 cells/uL  850-3900   ABSOLUTE MONOCYTES 499 cells/uL  200-950   ABSOLUTE EOSINOPHILS 0 cells/uL L    ABSOLUTE BASOPHILS 35 cells/uL  0-200   NEUTROPHILS 65 7 %     LYMPHOCYTES 25 1 %     MONOCYTES 8 6 %     EOSINOPHILS 0 0 %     BASOPHILS 0 6 %     MPV 7 8 fL  7 5-12 5     (1) COMPREHENSIVE METABOLIC PANEL [id number] 39:91WD [facility]     Test Name Result Flag Reference   GLUCOSE 145 mg/dL H 65-99   Fasting reference interval     For someone without known diabetes, a glucose  value >125 mg/dL indicates that they may have  diabetes and this should be confirmed with a  follow-up test    UREA NITROGEN (BUN) 24 mg/dL  7-25   CREATININE 1 11 mg/dL H 0 50-1 05   For patients >52years of age, the reference limit  for Creatinine is approximately 13% higher for people  identified as -American  eGFR NON-AFR   AMERICAN 56 mL/min/1 73m2 L > OR = 60   eGFR AFRICAN AMERICAN 65 mL/min/1 73m2  > OR = 60   BUN/CREATININE RATIO 22 (calc)  6-22   SODIUM 136 mmol/L  135-146   POTASSIUM 4 4 mmol/L  3 5-5 3   CHLORIDE 100 mmol/L     CARBON DIOXIDE 26 mmol/L  20-31   CALCIUM 9 6 mg/dL  8 6-10 4   PROTEIN, TOTAL 7 1 g/dL  6 1-8 1   ALBUMIN 4 2 g/dL  3 6-5 1   GLOBULIN 2 9 g/dL (calc)  1 9-3 7   ALBUMIN/GLOBULIN RATIO 1 4 (calc)  1 0-2 5   BILIRUBIN, TOTAL 0 4 mg/dL  0 2-1 2   ALKALINE PHOSPHATASE 83 U/L     AST 16 U/L  10-35   ALT 14 U/L  6-29     (1) LIPID PANEL, FASTING [id number] 07:02AM Everanant Soler     Test Name Result Flag Reference   CHOLESTEROL, TOTAL 215 mg/dL H 125-200   HDL CHOLESTEROL 59 mg/dL  > OR = 46   TRIGLICERIDES 97 mg/dL  <105   LDL-CHOLESTEROL 137 mg/dL (calc) H <130   Desirable range <100 mg/dL for patients with CHD or  diabetes and <70 mg/dL for diabetic patients with  known heart disease  CHOL/HDLC RATIO 3 6 (calc)  < OR = 5 0   NON HDL CHOLESTEROL 156 mg/dL (calc)     Target for non-HDL cholesterol is 30 mg/dL higher than   LDL cholesterol target  (Q) HEMOGLOBIN A1c [id number] 07:02AM Bruno Soler   REPORT COMMENT:  REQ 1 OF 2  FASTING:YES     Test Name Result Flag Reference   HEMOGLOBIN A1c 5 7 % of total Hgb H <5 7   For someone without known diabetes, a hemoglobin   A1c value between 5 7% and 6 4% is consistent with  prediabetes and should be confirmed with a   follow-up test      For someone with known diabetes, a value <7%  indicates that their diabetes is well controlled  A1c  targets should be individualized based on duration of  diabetes, age, comorbid conditions, and other  considerations  This assay result is consistent with an increased risk  of diabetes  Currently, no consensus exists regarding use of  hemoglobin A1c for diagnosis of diabetes for children  (Q) HEMOGLOBIN A1c [id number] 07:02AM Bruno Soler   REPORT COMMENT:  REQ 1 OF 2  FASTING:YES     Test Name Result Flag Reference   HEMOGLOBIN A1c 5 7 % of total Hgb H <5 7   For someone without known diabetes, a hemoglobin   A1c value between 5 7% and 6 4% is consistent with  prediabetes and should be confirmed with a   follow-up test      For someone with known diabetes, a value <7%  indicates that their diabetes is well controlled  A1c  targets should be individualized based on duration of  diabetes, age, comorbid conditions, and other  considerations  This assay result is consistent with an increased risk  of diabetes       Currently, no consensus exists regarding use of  hemoglobin A1c for diagnosis of diabetes for children

## 2018-01-10 NOTE — RESULT NOTES
Verified Results  (Q) MICROALBUMIN, RANDOM URINE (W/CREATININE) [id number] 07:11AM Woody Maloney     Test Name Result Flag Reference   CREATININE, RANDOM URINE 104 mg/dL     MICROALBUMIN 0 3 mg/dL     Reference Range  Not established   MICROALBUMIN/CREATININE$RATIO, RANDOM URINE 3 mcg/mg creat  <30   The ADA defines abnormalities in albumin  excretion as follows:     Category         Result (mcg/mg creatinine)     Normal                    <30  Microalbuminuria            Clinical albuminuria   > OR = 300     The ADA recommends that at least two of three  specimens collected within a 3-6 month period be  abnormal before considering a patient to be  within a diagnostic category  (Q) HEMOGLOBIN A1c [id number] 07:11AM Woody Guillenkendell   REPORT COMMENT:  FASTING:YES     Test Name Result Flag Reference   HEMOGLOBIN A1c 6 1 % of total Hgb H <5 7   According to ADA guidelines, hemoglobin A1c <7 0%  represents optimal control in non-pregnant diabetic  patients  Different metrics may apply to specific  patient populations  Standards of Medical Care in    Diabetes Care  2013;36:s11-s66     For the purpose of screening for the presence of  diabetes  <5 7%       Consistent with the absence of diabetes  5 7-6 4%    Consistent with increased risk for diabetes              (prediabetes)  >or=6 5%    Consistent with diabetes     This assay result is consistent with a higher risk  of diabetes  Currently, no consensus exists for use of hemoglobin  A1c for diagnosis of diabetes for children         Discussion/Summary   will discuss labs at follow up appt

## 2018-01-11 ENCOUNTER — ALLSCRIPTS OFFICE VISIT (OUTPATIENT)
Dept: OTHER | Facility: OTHER | Age: 58
End: 2018-01-11

## 2018-01-11 NOTE — RESULT NOTES
Verified Results  (Q) LIPID PANEL WITH REFLEX TO DIRECT LDL [id number] 08:05AM Jovanni Jones     Test Name Result Flag Reference   CHOLESTEROL, TOTAL 207 mg/dL H 125-200   HDL CHOLESTEROL 58 mg/dL  > OR = 46   TRIGLICERIDES 238 mg/dL  <150   LDL-CHOLESTEROL 127 mg/dL (calc)  <130   Desirable range <100 mg/dL for patients with CHD or  diabetes and <70 mg/dL for diabetic patients with  known heart disease  CHOL/HDLC RATIO 3 6 (calc)  < OR = 5 0   NON HDL CHOLESTEROL 149 mg/dL (calc)     Target for non-HDL cholesterol is 30 mg/dL higher than   LDL cholesterol target  (Q) TSH, 3RD GENERATION [id number] 08:05AM Jovanni Jones     Test Name Result Flag Reference   TSH 1 15 mIU/L     Reference Range                         > or = 20 Years  0 40-4 50                              Pregnancy Ranges            First trimester    0 26-2 66            Second trimester   0 55-2 73            Third trimester    0 43-2 91     (Q) MICROALBUMIN, RANDOM URINE (W/CREATININE) [id number] 08:05AM Jovanni Jones     Test Name Result Flag Reference   CREATININE, RANDOM URINE 58 mg/dL     MICROALBUMIN 0 2 mg/dL     Reference Range  Not established   MICROALBUMIN/CREATININE$RATIO, RANDOM URINE 3 mcg/mg creat  <30   The ADA defines abnormalities in albumin  excretion as follows:     Category         Result (mcg/mg creatinine)     Normal                    <30  Microalbuminuria            Clinical albuminuria   > OR = 300     The ADA recommends that at least two of three  specimens collected within a 3-6 month period be  abnormal before considering a patient to be  within a diagnostic category  (Q) HEMOGLOBIN A1c WITH eAG [id number] 08:05AM Jovanni Jones   REPORT COMMENT:  FASTING:YES     Test Name Result Flag Reference   HEMOGLOBIN A1c 6 2 % of total Hgb H <5 7   According to ADA guidelines, hemoglobin A1c <7 0%  represents optimal control in non-pregnant diabetic  patients   Different metrics may apply to specific  patient populations  Standards of Medical Care in    Diabetes Care  2013;36:s11-s66     For the purpose of screening for the presence of  diabetes  <5 7%       Consistent with the absence of diabetes  5 7-6 4%    Consistent with increased risk for diabetes              (prediabetes)  >or=6 5%    Consistent with diabetes     This assay result is consistent with a higher risk  of diabetes  Currently, no consensus exists for use of hemoglobin  A1c for diagnosis of diabetes for children     eAG (mg/dL) 131 (calc)     eAG (mmol/L) 7 3 (calc)         Discussion/Summary   will discuss labs at follow up appt
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
## 2018-01-12 NOTE — RESULT NOTES
Verified Results  (1) LIPID PANEL, FASTING 01Oct2016 07:13AM [email or web address]     Test Name Result Flag Reference   CHOLESTEROL, TOTAL 179 mg/dL  125-200   HDL CHOLESTEROL 53 mg/dL  > OR = 46   TRIGLICERIDES 523 mg/dL  <150   LDL-CHOLESTEROL 105 mg/dL (calc)  <130   Desirable range <100 mg/dL for patients with CHD or  diabetes and <70 mg/dL for diabetic patients with  known heart disease  CHOL/HDLC RATIO 3 4 (calc)  < OR = 5 0   NON HDL CHOLESTEROL 126 mg/dL (calc)     Target for non-HDL cholesterol is 30 mg/dL higher than   LDL cholesterol target  CHOLESTEROL, TOTAL 179 mg/dL  125-200   HDL CHOLESTEROL 53 mg/dL  > OR = 46   TRIGLICERIDES 267 mg/dL  <150   LDL-CHOLESTEROL 105 mg/dL (calc)  <130   Desirable range <100 mg/dL for patients with CHD or  diabetes and <70 mg/dL for diabetic patients with  known heart disease  CHOL/HDLC RATIO 3 4 (calc)  < OR = 5 0   NON HDL CHOLESTEROL 126 mg/dL (calc)     Target for non-HDL cholesterol is 30 mg/dL higher than   LDL cholesterol target  (Q) MICROALBUMIN, RANDOM URINE (W/CREATININE) 01Oct2016 07:13AM [email or web address]     Test Name Result Flag Reference   CREATININE, RANDOM URINE 57 mg/dL     MICROALBUMIN 0 2 mg/dL     Reference Range  Not established   MICROALBUMIN/CREATININE$RATIO, RANDOM URINE 4 mcg/mg creat  <30   The ADA defines abnormalities in albumin  excretion as follows:     Category         Result (mcg/mg creatinine)     Normal                    <30  Microalbuminuria            Clinical albuminuria   > OR = 300     The ADA recommends that at least two of three  specimens collected within a 3-6 month period be  abnormal before considering a patient to be  within a diagnostic category     CREATININE, RANDOM URINE 57 mg/dL     MICROALBUMIN 0 2 mg/dL     Reference Range  Not established   MICROALBUMIN/CREATININE$RATIO, RANDOM URINE 4 mcg/mg creat  <30   The ADA defines abnormalities in albumin  excretion as follows:     Category Result (mcg/mg creatinine)     Normal                    <30  Microalbuminuria            Clinical albuminuria   > OR = 300     The ADA recommends that at least two of three  specimens collected within a 3-6 month period be  abnormal before considering a patient to be  within a diagnostic category  *(Q) VITAMIN D, 25-HYDROXY, LC/MS/MS 01Oct2016 07:13LONI Martinez     Test Name Result Flag Reference   VITAMIN D, 25-OH, TOTAL 40 ng/mL     Vitamin D Status         25-OH Vitamin D:     Deficiency:                    <20 ng/mL  Insufficiency:             20 - 29 ng/mL  Optimal:                 > or = 30 ng/mL     For 25-OH Vitamin D testing on patients on   D2-supplementation and patients for whom quantitation   of D2 and D3 fractions is required, the QuestAssureD(TM)  25-OH VIT D, (D2,D3), LC/MS/MS is recommended: order   code 46365 (patients >2yrs)  For more information on this test, go to:  http://[employer]/faq/ODH482  (This link is being provided for   informational/educational purposes only )     (Q) HEMOGLOBIN A1c 01Oct2016 07:13AM Ruthann Martinez   REPORT COMMENT:  FASTING:YES     Test Name Result Flag Reference   HEMOGLOBIN A1c 6 2 % of total Hgb H <5 7   According to ADA guidelines, hemoglobin A1c <7 0%  represents optimal control in non-pregnant diabetic  patients  Different metrics may apply to specific  patient populations  Standards of Medical Care in  [phone]  Diabetes Care  2013;36:s11-s66     For the purpose of screening for the presence of  diabetes  <5 7%       Consistent with the absence of diabetes  5 7-6 4%    Consistent with increased risk for diabetes              (prediabetes)  >or=6 5%    Consistent with diabetes     This assay result is consistent with a higher risk  of diabetes  Currently, no consensus exists for use of hemoglobin  A1c for diagnosis of diabetes for children

## 2018-01-13 VITALS
HEIGHT: 63 IN | BODY MASS INDEX: 20.91 KG/M2 | RESPIRATION RATE: 16 BRPM | DIASTOLIC BLOOD PRESSURE: 68 MMHG | TEMPERATURE: 97.2 F | HEART RATE: 76 BPM | SYSTOLIC BLOOD PRESSURE: 112 MMHG | WEIGHT: 118 LBS

## 2018-01-13 VITALS
RESPIRATION RATE: 16 BRPM | BODY MASS INDEX: 21.26 KG/M2 | WEIGHT: 120 LBS | DIASTOLIC BLOOD PRESSURE: 78 MMHG | HEART RATE: 90 BPM | HEIGHT: 63 IN | OXYGEN SATURATION: 99 % | SYSTOLIC BLOOD PRESSURE: 140 MMHG | TEMPERATURE: 98.9 F

## 2018-01-13 VITALS
HEART RATE: 72 BPM | DIASTOLIC BLOOD PRESSURE: 68 MMHG | BODY MASS INDEX: 21.97 KG/M2 | HEIGHT: 63 IN | WEIGHT: 124 LBS | TEMPERATURE: 98 F | SYSTOLIC BLOOD PRESSURE: 126 MMHG | RESPIRATION RATE: 16 BRPM

## 2018-01-13 VITALS
BODY MASS INDEX: 20.6 KG/M2 | OXYGEN SATURATION: 99 % | TEMPERATURE: 98.5 F | HEIGHT: 63 IN | SYSTOLIC BLOOD PRESSURE: 120 MMHG | WEIGHT: 116.25 LBS | RESPIRATION RATE: 16 BRPM | HEART RATE: 77 BPM | DIASTOLIC BLOOD PRESSURE: 60 MMHG

## 2018-01-13 VITALS
SYSTOLIC BLOOD PRESSURE: 132 MMHG | BODY MASS INDEX: 21.09 KG/M2 | RESPIRATION RATE: 16 BRPM | TEMPERATURE: 97.2 F | WEIGHT: 119 LBS | HEART RATE: 84 BPM | HEIGHT: 63 IN | DIASTOLIC BLOOD PRESSURE: 74 MMHG

## 2018-01-14 VITALS
HEIGHT: 63 IN | OXYGEN SATURATION: 98 % | BODY MASS INDEX: 21.97 KG/M2 | TEMPERATURE: 98.6 F | DIASTOLIC BLOOD PRESSURE: 80 MMHG | SYSTOLIC BLOOD PRESSURE: 110 MMHG | WEIGHT: 124 LBS | HEART RATE: 86 BPM | RESPIRATION RATE: 16 BRPM

## 2018-01-14 NOTE — PROGRESS NOTES
Assessment    1  Encounter for preventive health examination (V70 0) (Z00 00)   2  Type 2 diabetes with ophthalmic manifestation (250 50) (E11 39)   3  Mixed hyperlipidemia (272 2) (E78 2)   4  Benign essential hypertension (401 1) (I10)   5  Diabetic retinopathy (250 50,362 01) (E11 319)   6  Refusal of statin medication by patient (V64 2) (Z53 29)    Plan  Benign essential hypertension    · Justice 5-40 MG Oral Tablet; Take 1 tablet by mouth  every day   · Bystolic 5 MG Oral Tablet; Take 1 tablet by mouth  every day  Benign essential hypertension, Diabetic retinopathy, Mixed hyperlipidemia,  Osteoporosis, Type 2 diabetes with ophthalmic manifestation    · (1) VITAMIN D 25-HYDROXY; Status:Active; Requested CHV:34SRT5422;    · Follow-up visit in 3 months Evaluation and Treatment  Follow-up  Status: Hold For -  Scheduling  Requested for: 05PFM8780  Benign essential hypertension, Diabetic retinopathy, Mixed hyperlipidemia, Type 2  diabetes with ophthalmic manifestation    · (1) CBC/PLT/DIFF; Status:Active; Requested POY:38QSC5370;    · (1) COMPREHENSIVE METABOLIC PANEL; Status:Active; Requested KSENIA:39QIM5792;    · (1) HEMOGLOBIN A1C; Status:Active; Requested NPJ:40OWU0318;    · (1) LIPID PANEL, FASTING; Status:Active; Requested JUSTIN:08SUE4478;    · (Q) MICROALBUMIN, RANDOM URINE (W/CREATININE); Status:Active; Requested  SOB:60HZG5942; Health Maintenance    · Aspirin 81 MG TABS; 1 every day  Type 2 diabetes with ophthalmic manifestation    · Pravastatin Sodium 10 MG Oral Tablet; 1/3 tab at bedtime   · Kombiglyze XR 5-1000 MG Oral Tablet Extended Release 24 Hour; Take 1  tablet by mouth  every day   · Begin a limited exercise program ; Status:Complete;   Done: 54GLZ9327 09:46AM   · Begin or continue regular aerobic exercise   Gradually work up to at least 3 sessions of 30  minutes of exercise a week ; Status:Complete;   Done: 15QIP7640 09:46AM   · Brush your teeth freq1 and floss at least once a day ; Status:Complete; Done:  81XHQ8455 09:46AM   · Continue with our present treatment plan ; Status:Complete;   Done: 43EWL3193 09:46AM   · Cut your nails straight across ; Status:Complete;   Done: 80VIT5329 09:46AM   · Have your eyes examined by an eye doctor every year ; Status:Complete;   Done:  45CGW7000 09:46AM   · If you have symptoms of being hypoglycemic or your blood sugar is less than 60, you  need to eat or drink a source of sugar ; Status:Complete;   Done: 22LMQ5844 09:46AM   · Inspect your feet and legs daily if you have vascular disease ; Status:Complete;   Done:  80RRN6727 09:46AM   · Inspect your feet daily ; Status:Complete;   Done: 67BRM3924 09:46AM   · It is important to take good care of your feet if you have diabetes ; Status:Complete;    Done: 69PNN0406 09:46AM   · It is important to take good care of your feet ; Status:Complete;   Done: 26TQN7737  09:46AM   · Some eating tips that can help you lose weight ; Status:Complete;   Done: 65RDL1328  09:46AM   · There are many exercise options for seniors ; Status:Complete;   Done: 51VNA8914  09:46AM   · We recommend that you bring your body mass index down to 26 ; Status:Complete;    Done: 47UNM3219 09:46AM   · We want you to follow the Therapeutic Lifestyle Changes (TLC) diet ; Status:Complete;    Done: 97FHP3932 09:46AM   · Wear shoes that give your toes plenty of room ; Status:Complete;   Done: 28JZJ0012  09:46AM   · Call [phone] if: Ketones are present in the urine ; Status:Complete;   Done:  17TZZ3942 09:46AM   · Call [phone] if: The ketones in the urine persist despite treatment ;  Status:Complete;   Done: 44AAE1719 09:46AM   · Call [phone] if: You start vomiting ; Status:Complete;   Done: 50JWN8112 09:46AM   · Call [phone] if: Your blood sugar is steadily becoming higher ; Status:Complete;    Done: 27TBX5434 09:46AM   · Call 911 if: There are symptoms of ketoacidosis  ; Status:Complete;   Done: 62RAH8480  09:46AM   · Call 911 if:  You have a seizure ; Status:Complete;   Done: 65MKV0370 09:46AM   · Call 911 if: You have any symptoms of a stroke ; Status:Complete;   Done: 08FGH3104  09:46AM   · Call 911 if: You have fainted or passed out ; Status:Complete;   Done: 62QFH6986  09:46AM   · Seek Immediate Medical Attention if: There are signs that the blood sugar is too high  (hyperglycemia) ; Status:Complete;   Done: 33AWL8938 09:46AM   · Seek Immediate Medical Attention if: There are signs that the blood sugar is too low  (hypoglycemia) ; Status:Complete;   Done: 45GDR4968 09:46AM   · Seek Immediate Medical Attention if: You become dehydrated ; Status:Complete;   Done:  08FYG4017 09:46AM   · Seek Immediate Medical Attention if: You experience a new kind of chest pain (angina)  or pressure ; Status:Complete;   Done: 36SWU7038 09:46AM   · Seek Immediate Medical Attention if: You notice that breathing is rapid, more than 40  times a minute ; Status:Complete;   Done: 48PYJ6542 09:46AM   · Seek Immediate Medical Attention if: Your blood sugar is higher than 400 ;  Status:Complete;   Done: 75VZM5734 09:46AM   · Seek Immediate Medical Attention if: Your eyesight becomes blurry or you have difficulty  seeing ; Status:Complete;   Done: 37MPS6834 09:46AM   · *VB-Foot Exam; Status:Complete;   Done: 60VBU3903 09:47AM   · Flulaval Quadrivalent 0 5 ML Intramuscular Suspension; To Be Done:  92RKD4291   · *VB-Foot Exam ; every 1 year; Last 21IMX4142; Next 07IZX3636; Status:Active    Chief Complaint  CPE rmklpn      History of Present Illness  HM, Adult Female: The patient is being seen for a health maintenance evaluation  General Health:   Screening:   HPI: pt is here for a full physical    pt states she feels well      Review of Systems    Constitutional: No fever, no chills, feels well, no tiredness, no recent weight gain or weight loss  Eyes: No complaints of eye pain, no red eyes, no eyesight problems, no discharge, no dry eyes, no itching of eyes     ENT: no complaints of earache, no loss of hearing, no nose bleeds, no nasal discharge, no sore throat, no hoarseness  Cardiovascular: No complaints of slow heart rate, no fast heart rate, no chest pain, no palpitations, no leg claudication, no lower extremity edema  Respiratory: No complaints of shortness of breath, no wheezing, no cough, no SOB on exertion, no orthopnea, no PND  Gastrointestinal: No complaints of abdominal pain, no constipation, no nausea or vomiting, no diarrhea, no bloody stools  Genitourinary: No complaints of dysuria, no incontinence, no pelvic pain, no dysmenorrhea, no vaginal discharge or bleeding  Musculoskeletal: No complaints of arthralgias, no myalgias, no joint swelling or stiffness, no limb pain or swelling  Integumentary: No complaints of skin rash or lesions, no itching, no skin wounds, no breast pain or lump  Neurological: No complaints of headache, no confusion, no convulsions, no numbness, no dizziness or fainting, no tingling, no limb weakness, no difficulty walking  Psychiatric: Not suicidal, no sleep disturbance, no anxiety or depression, no change in personality, no emotional problems  Endocrine: No complaints of proptosis, no hot flashes, no muscle weakness, no deepening of the voice, no feelings of weakness  Hematologic/Lymphatic: No complaints of swollen glands, no swollen glands in the neck, does not bleed easily, does not bruise easily  Active Problems    1  Abnormal Liver Function Test (790 6)   2  Abnormal tumor markers (795 89) (R97 8)   3  Adenocarcinoma of breast, unspecified laterality (174 9) (C50 919)   4  Anemia (285 9) (D64 9)   5  Benign essential hypertension (401 1) (I10)   6  Breast cancer (174 9) (C50 919)   7  Diabetic Cataract (366 41)   8  Diabetic retinopathy (250 50,362 01) (E11 319)   9  Ganglion of right wrist (727 41) (M67 431)   10  Mixed hyperlipidemia (272 2) (E78 2)   11  Must sit up to breath (786 02) (R06 01)   12   Osteoporosis (733 00) (M81 0)   13  Palpitations (785 1) (R00 2)   14  Pruritus (698 9) (L29 9)   15  Travel sickness (994 6) (T75 3XXA)   16  Type 2 diabetes with ophthalmic manifestation (250 50) (E11 39)   17  Varicose veins without complication (304 2) (L98 36)   18  Well adult on routine health check (V70 0) (Z00 00)    Past Medical History    · Acute upper respiratory infection (465 9) (J06 9)   · History of Acute upper respiratory infection (465 9) (J06 9)   · History of Breast Cancer (V10 3)   · History of Cough (786 2) (R05)   · History of Diabetes Mellitus (250 00)   · History of acute sinusitis (V12 69) (Z87 09)   · History of diarrhea (V12 79) (Z87 898)   · History of hypertension (V12 59) (Z86 79)    Surgical History    · History of Breast Surgery Lumpectomy   · History of Breast Surgery Mastectomy   · History of Oral Surgery Tooth Extraction    Family History  Father    · Family history of hypertension (V17 49) (Z82 49)  Family History    · Family history of Diabetes Mellitus (V18 0)   · Family history of Heart Disease (V17 49)    Social History    · Being A Social Drinker   · Never A Smoker    Current Meds   1  Aspirin 81 MG TABS; 1 every day; Therapy: 38PYT0642 to  Requested for: 06Jun2016 Recorded   2  Justice 5-40 MG Oral Tablet; Take 1 tablet by mouth  every day; Therapy: 40KCA7504 to (Evaluate:09Jun2016)  Requested for: 57DLK9893; Last   Rx:11Mar2016 Ordered   3  Bystolic 5 MG Oral Tablet; Take 1 tablet by mouth  every day; Therapy: 13Tjx6846 to (Evaluate:03Apr2017)  Requested for: 08Apr2016; Last   Rx:08Apr2016 Ordered   4  Glucocard Vital Test In Vitro Strip; TEST FIVE TIMES DAILY; Therapy: 08IWH5202 to ([place] [place])  Requested for: 30Wve3607; Last   Rx:67Utm3978 Ordered   5  Kombiglyze XR 5-1000 MG Oral Tablet Extended Release 24 Hour; Take 1 tablet by   mouth  every day; Therapy: 89Prs8091 to (Evaluate:09Jun2016)  Requested for: 54HYZ6068; Last   Rx:11Mar2016 Ordered   6   Letrozole 2 5 MG Oral Tablet; Take 1 tablet daily  Requested for: 49UKE7785; Last   Rx:11Mar2016 Ordered   7  Multivitamins TABS; 1 Every Day; Therapy: 03NNS8149 to  Requested for: 32ITR4618 Recorded   8  Probiotic Daily CAPS; Therapy: (Recorded:14Tcg6021) to Recorded    Allergies    1  Codeine Derivatives   2  Erythromycin TABS   3  Penicillins   4  Sulfa Drugs    Vitals   Recorded: 06Jun2016 09:43AM Recorded: 24KGV6680 09:12AM   Temperature  97 4 F   Heart Rate  62   Respiration  18   Systolic 833 531   Diastolic 60 80   BP Comments recheck    Height  5 ft 2 5 in   Weight  115 lb    BMI Calculated  20 7   BSA Calculated  1 52     Physical Exam    Constitutional   General appearance: No acute distress, well appearing and well nourished  Eyes   Conjunctiva and lids: No swelling, erythema or discharge  Pupils and irises: Equal, round, reactive to light  Ophthalmoscopic examination: Normal fundi and optic discs  Ears, Nose, Mouth, and Throat   External inspection of ears and nose: Normal     Otoscopic examination: Tympanic membranes translucent with normal light reflex  Canals patent without erythema  Hearing: Normal     Nasal mucosa, septum, and turbinates: Normal without edema or erythema  Lips, teeth, and gums: Normal, good dentition  Oropharynx: Normal with no erythema, edema, exudate or lesions  Neck   Neck: Supple, symmetric, trachea midline, no masses  Thyroid: Normal, no thyromegaly  Pulmonary   Respiratory effort: No increased work of breathing or signs of respiratory distress  Percussion of chest: Normal     Palpation of chest: Normal     Auscultation of lungs: Clear to auscultation  Cardiovascular   Palpation of heart: Normal PMI, no thrills  Auscultation of heart: Normal rate and rhythm, normal S1 and S2, no murmurs  Carotid pulses: 2+ bilaterally  Abdominal aorta: Normal     Femoral pulses: 2+ bilaterally  Pedal pulses: 2+ bilaterally      Examination of extremities for edema and/or varicosities: Normal     Chest   Breasts: Normal, no dimpling or skin changes appreciated  Palpation of breasts and axillae: Normal, no masses palpated  Abdomen   Abdomen: Non-tender, no masses  Liver and spleen: No hepatomegaly or splenomegaly  Examination for hernias: No hernia appreciated  Lymphatic   Palpation of lymph nodes in neck: No lymphadenopathy  Palpation of lymph nodes in axillae: No lymphadenopathy  Palpation of lymph nodes in groin: No lymphadenopathy  Palpation of lymph nodes in other areas: No lymphadenopathy  Musculoskeletal   Gait and station: Normal     Digits and nails: Normal without clubbing or cyanosis  Joints, bones, and muscles: Normal     Range of motion: Normal     Stability: Normal     Muscle strength/tone: Normal     Skin   Skin and subcutaneous tissue: Normal without rashes or lesions  Palpation of skin and subcutaneous tissue: Normal turgor  Neurologic   Cranial nerves: Cranial nerves II-XII intact  Reflexes: 2+ and symmetric  Sensation: No sensory loss  Psychiatric   Judgment and insight: Normal     Orientation to person, place, and time: Normal     Recent and remote memory: Intact  Mood and affect: Normal       Socks and shoes removed, the Right Foot: the foot was normal, no swelling, no erythema  The toes on the right were normal and with full range of motion  The sensory exam showed  normal vibratory sensation at the level of the toes on the right  Normal tactile sensation with monofilament testing throughout the right foot  Socks and shoes removed, Left Foot: the foot was normal, no swelling, no erythema  The toes on the left were normal and with full range of motion  The sensory exam showed  normal vibratory sensation at the level of the toes on the left  Normal tactile sensation with monofilament testing throughout the left foot  Capillary refills findings on the right were normal in the toes     Pulses:   2+ in the dorsalis pedis on the right  Capillary refills findings on the left were normal in the toes  Pulses:   2+ in the dorsalis pedis on the left  Assign Risk Category: 0: No loss of protective sensation, no deformity  No present risk  Results/Data  (1) GLUCOSE,  FASTING 29UAF9263 08:39AM      Test Name Result Flag Reference   GLUCOSE FASTING 129       Summary / No summary entered :      No summary entered  Documents attached :      189 [facility] Work - Radha Pace: [id number] - Image Encounter - Aminta Lepe -      (Hematology Oncology) (Additional Information Document)  (Q) LIPID PANEL WITH REFLEX TO DIRECT LDL 07GKD1304 07:10AM Farida Jean     Test Name Result Flag Reference   CHOLESTEROL, TOTAL 179 mg/dL  125-200   HDL CHOLESTEROL 45 mg/dL L > OR = 46   TRIGLICERIDES 062 mg/dL  <150   LDL-CHOLESTEROL 112 mg/dL (calc)  <130   Desirable range <100 mg/dL for patients with CHD or  diabetes and <70 mg/dL for diabetic patients with  known heart disease  CHOL/HDLC RATIO 4 0 (calc)  < OR = 5 0   NON HDL CHOLESTEROL 134 mg/dL (calc)     Target for non-HDL cholesterol is 30 mg/dL higher than   LDL cholesterol target  (Q) HEMOGLOBIN A1c WITH eAG 20WGU8756 07:10AM Farida Jean   REPORT COMMENT:  FASTING:YES     Test Name Result Flag Reference   HEMOGLOBIN A1c 6 1 % of total Hgb H <5 7   According to ADA guidelines, hemoglobin A1c <7 0%  represents optimal control in non-pregnant diabetic  patients  Different metrics may apply to specific  patient populations  Standards of Medical Care in    Diabetes Care  2013;36:s11-s66     For the purpose of screening for the presence of  diabetes  <5 7%       Consistent with the absence of diabetes  5 7-6 4%    Consistent with increased risk for diabetes              (prediabetes)  >or=6 5%    Consistent with diabetes     This assay result is consistent with a higher risk  of diabetes       Currently, no consensus exists for use of hemoglobin  A1c for diagnosis of diabetes for children  eAG (mg/dL) 128 (calc)     eAG (mmol/L) 7 1 (calc)       *(Q) VITAMIN D, 25-HYDROXY, LC/MS/MS 28IIW3280 07:10AM Kenna Jean     Test Name Result Flag Reference   VITAMIN D, 25-OH, TOTAL 63 ng/mL     Vitamin D Status         25-OH Vitamin D:     Deficiency:                    <20 ng/mL  Insufficiency:             20 - 29 ng/mL  Optimal:                 > or = 30 ng/mL     For 25-OH Vitamin D testing on patients on   D2-supplementation and patients for whom quantitation   of D2 and D3 fractions is required, the QuestAssureD(TM)  25-OH VIT D, (D2,D3), LC/MS/MS is recommended: order   code 23080 (patients >2yrs)  For more information on this test, go to:  http://[employer]/faq/AMS134  (This link is being provided for   informational/educational purposes only )       Procedure    Procedure: Visual Acuity Test    Indication: routine screening  Inforrmation supplied by a Snellen chart  Results: 20/15 in both eyes without corrective device, 20/15 in the right eye without corrective device, 20/15 in the left eye without corrective device      Health Management  Type 2 diabetes with ophthalmic manifestation   *VB - Eye Exam; every 1 year; Last 28WOE2698; Next Due: 81Bho9115; Near Due  *VB-Foot Exam; every 1 year; Last 64BIF6939; Next Due: 26ICH4376;  Active    Future Appointments    Date/Time Provider Specialty Site   06/06/2016 03:00 PM Moncho Amos MD Hematology Oncology [facility]     Signatures   Electronically signed by : Moses Barboza DO; Jun 6 2016  9:47AM EST                       (Author)

## 2018-01-15 VITALS
HEART RATE: 76 BPM | WEIGHT: 125 LBS | SYSTOLIC BLOOD PRESSURE: 124 MMHG | TEMPERATURE: 97.6 F | HEIGHT: 63 IN | BODY MASS INDEX: 22.15 KG/M2 | DIASTOLIC BLOOD PRESSURE: 74 MMHG | RESPIRATION RATE: 16 BRPM

## 2018-01-15 NOTE — RESULT NOTES
Discussion/Summary   [facility]     Verified Results  (1) CBC/PLT/DIFF 43Rtz2789 07:05AM Ean Hernandez     Test Name Result Flag Reference   WHITE BLOOD CELL COUNT 7 1 Thousand/uL  3 8-10 8   RED BLOOD CELL COUNT 4 94 Million/uL  3 80-5 10   HEMOGLOBIN 13 0 g/dL  11 7-15 5   HEMATOCRIT 39 8 %  35 0-45 0   MCV 80 6 fL  80 0-100 0   MCH 26 3 pg L 27 0-33 0   MCHC 32 7 g/dL  32 0-36 0   RDW 14 0 %  11 0-15 0   PLATELET COUNT 684 Thousand/uL  140-400   ABSOLUTE NEUTROPHILS 4892 cells/uL  4664-9049   ABSOLUTE LYMPHOCYTES 1605 cells/uL  850-3900   ABSOLUTE MONOCYTES 582 cells/uL  200-950   ABSOLUTE EOSINOPHILS 0 cells/uL L    ABSOLUTE BASOPHILS 21 cells/uL  0-200   NEUTROPHILS 68 9 %     LYMPHOCYTES 22 6 %     MONOCYTES 8 2 %     EOSINOPHILS 0 0 %     BASOPHILS 0 3 %     MPV 9 9 fL  7 5-12 5     (1) COMPREHENSIVE METABOLIC PANEL 77TUJ8523 13:39VU Ean Hernandez     Test Name Result Flag Reference   GLUCOSE 137 mg/dL H 65-99   Fasting reference interval     For someone without known diabetes, a glucose  value >125 mg/dL indicates that they may have  diabetes and this should be confirmed with a  follow-up test    UREA NITROGEN (BUN) 24 mg/dL  7-25   CREATININE 1 07 mg/dL H 0 50-1 05   For patients >52years of age, the reference limit  for Creatinine is approximately 13% higher for people  identified as -American  eGFR NON-AFR   AMERICAN 58 mL/min/1 73m2 L > OR = 60   eGFR AFRICAN AMERICAN 68 mL/min/1 73m2  > OR = 60   BUN/CREATININE RATIO 22 (calc)  6-22   SODIUM 140 mmol/L  135-146   POTASSIUM 4 7 mmol/L  3 5-5 3   CHLORIDE 100 mmol/L     CARBON DIOXIDE 29 mmol/L  20-31   CALCIUM 9 8 mg/dL  8 6-10 4   PROTEIN, TOTAL 7 0 g/dL  6 1-8 1   ALBUMIN 4 3 g/dL  3 6-5 1   GLOBULIN 2 7 g/dL (calc)  1 9-3 7   ALBUMIN/GLOBULIN RATIO 1 6 (calc)  1 0-2 5   BILIRUBIN, TOTAL 0 4 mg/dL  0 2-1 2   ALKALINE PHOSPHATASE 77 U/L     AST 16 U/L  10-35   ALT 14 U/L  6-29     (1) LIPID PANEL, FASTING 80Pnj9381 07:05AM Lolly Bone     Test Name Result Flag Reference   CHOLESTEROL, TOTAL 188 mg/dL  <200   HDL CHOLESTEROL 48 mg/dL L >55   TRIGLICERIDES 200 mg/dL H <150   LDL-CHOLESTEROL 111 mg/dL (calc) H    Reference range: <100     Desirable range <100 mg/dL for patients with CHD or  diabetes and <70 mg/dL for diabetic patients with  known heart disease  The Ty-Caicedo calculation is a validated novel   method that provides better accuracy than the   Friedewald equation in the estimation of LDL-C,   particularly when TG levels are 150-400 mg/dL and   LDL-C levels are lower than 70 mg/dL  Reference:  Vin Thompson et al  Comparison of a Novel   Method vs the Friedewald Equation for Estimating   Low-Density Lipoprotein Cholesterol Levels From the   Standard Lipid Profile  DARIAN  4742;894(54): 7144-5741  For additional information, please refer to  http://[employer]/faq/EMY074  (This link is being provided for informational/  educational purposes only )   CHOL/HDLC RATIO 3 9 (calc)  <5 0   NON HDL CHOLESTEROL 140 mg/dL (calc) H <130   For patients with diabetes plus 1 major ASCVD risk   factor, treating to a non-HDL-C goal of <100 mg/dL   (LDL-C of <70 mg/dL) is considered a therapeutic   option  (Q) HEMOGLOBIN A1c 55Sdr0945 07:05AM Lolly Quintana   REPORT COMMENT:  REQ 1 OF 2  FASTING:YES     Test Name Result Flag Reference   HEMOGLOBIN A1c 5 9 % of total Hgb H <5 7   For someone without known diabetes, a hemoglobin   A1c value between 5 7% and 6 4% is consistent with  prediabetes and should be confirmed with a   follow-up test      For someone with known diabetes, a value <7%  indicates that their diabetes is well controlled  A1c  targets should be individualized based on duration of  diabetes, age, comorbid conditions, and other  considerations  This assay result is consistent with an increased risk  of diabetes       Currently, no consensus exists regarding use of  hemoglobin A1c for diagnosis of diabetes for children  (Q) HEMOGLOBIN A1c 00Ylj0812 07:05AM Adonisbarby Samm   REPORT COMMENT:  REQ 1 OF 2  FASTING:YES     Test Name Result Flag Reference   HEMOGLOBIN A1c 5 9 % of total Hgb H <5 7   For someone without known diabetes, a hemoglobin   A1c value between 5 7% and 6 4% is consistent with  prediabetes and should be confirmed with a   follow-up test      For someone with known diabetes, a value <7%  indicates that their diabetes is well controlled  A1c  targets should be individualized based on duration of  diabetes, age, comorbid conditions, and other  considerations  This assay result is consistent with an increased risk  of diabetes  Currently, no consensus exists regarding use of  hemoglobin A1c for diagnosis of diabetes for children

## 2018-01-15 NOTE — RESULT NOTES
Discussion/Summary   Saida- please follow up with your gastroenterologist if the pain recurs  Yudith Huynh     Verified Results  * 58 Maxx Narayanan 72IUW6965 07:16AM Lesvia Peters Order Number: [id number]    - Patient Instructions: To schedule this appointment, please contact Central Scheduling at [phone]  Test Name Result Flag Reference   US ABDOMEN COMPLETE (Report)     ABDOMEN ULTRASOUND, COMPLETE WITH DOPPLER     INDICATION: Abdominal pain  COMPARISON: CT chest, abdomen and pelvis 9/29/2006     TECHNIQUE:  Real-time ultrasound of the abdomen was performed with a curvilinear transducer with both volumetric sweeps and still imaging techniques  FINDINGS:     PANCREAS: Visualized portions of the pancreas are within normal limits  AORTA AND IVC: Visualized portions are normal for patient age  LIVER:   Size: Within normal range  The liver measures 13 9 cm in the midclavicular line  Contour: Surface contour is smooth  Parenchyma: There is mild diffuse increased echogenicity and smooth echotexture, without significant beam attenuation, most consistent with mild hepatic steatosis  No evidence of suspicious mass  Limited imaging of the main portal vein shows it to be patent and hepatopetal      BILIARY:   The gallbladder is surgically absent  No intrahepatic biliary dilatation  CBD measures 4 mm  No choledocholithiasis  KIDNEY:    Right kidney measures 10 2 x 4 1 cm  Mild cortical thinning  Mildly prominent renal sinus echogenicity  Otherwise, within normal limits  Left kidney measures 9 2 x 5 1 cm  Mild cortical thinning and lobulation  Mildly prominent renal sinus echogenicity  Otherwise, within normal limits  SPLEEN:    Measures 10 9 cm  Within normal limits  ASCITES: None  IMPRESSION:     Mild hepatic steatosis suspected  Otherwise, grossly unremarkable abdominal ultrasound status post cholecystectomy         Workstation performed: [id number]     Signed by:   Bandar Espinoza DO   11/27/17       Signatures   Electronically signed by : Milagro Avila; Nov 28 2017  3:12PM EST                       (Author)

## 2018-01-15 NOTE — RESULT NOTES
Verified Results  (Q) LIPID PANEL WITH REFLEX TO DIRECT LDL [id number] 07:10AM Delmonico, Deberah Cockayne     Test Name Result Flag Reference   CHOLESTEROL, TOTAL 179 mg/dL  125-200   HDL CHOLESTEROL 45 mg/dL L > OR = 46   TRIGLICERIDES 035 mg/dL  <150   LDL-CHOLESTEROL 112 mg/dL (calc)  <130   Desirable range <100 mg/dL for patients with CHD or  diabetes and <70 mg/dL for diabetic patients with  known heart disease  CHOL/HDLC RATIO 4 0 (calc)  < OR = 5 0   NON HDL CHOLESTEROL 134 mg/dL (calc)     Target for non-HDL cholesterol is 30 mg/dL higher than   LDL cholesterol target  (Q) HEMOGLOBIN A1c WITH eAG [id number] 07:10AM Delmonico, Deberah Cockayne   REPORT COMMENT:  FASTING:YES     Test Name Result Flag Reference   HEMOGLOBIN A1c 6 1 % of total Hgb H <5 7   According to ADA guidelines, hemoglobin A1c <7 0%  represents optimal control in non-pregnant diabetic  patients  Different metrics may apply to specific  patient populations  Standards of Medical Care in    Diabetes Care  2013;36:s11-s66     For the purpose of screening for the presence of  diabetes  <5 7%       Consistent with the absence of diabetes  5 7-6 4%    Consistent with increased risk for diabetes              (prediabetes)  >or=6 5%    Consistent with diabetes     This assay result is consistent with a higher risk  of diabetes  Currently, no consensus exists for use of hemoglobin  A1c for diagnosis of diabetes for children     eAG (mg/dL) 128 (calc)     eAG (mmol/L) 7 1 (calc)       *(Q) VITAMIN D, 25-HYDROXY, LC/MS/MS [id number] 07:10AM Delmonico, Deberah Cockayne     Test Name Result Flag Reference   VITAMIN D, 25-OH, TOTAL 63 ng/mL     Vitamin D Status         25-OH Vitamin D:     Deficiency:                    <20 ng/mL  Insufficiency:             20 - 29 ng/mL  Optimal:                 > or = 30 ng/mL     For 25-OH Vitamin D testing on patients on   D2-supplementation and patients for whom quantitation   of D2 and D3 fractions is required, the QuestAssureD(TM)  25-OH VIT D, (D2,D3), LC/MS/MS is recommended: order   code 90762 (patients >2yrs)  For more information on this test, go to:  http://education  Zidisha/faq/GCY129  (This link is being provided for   informational/educational purposes only )       Discussion/Summary   Labs stable or in normal range  continue medications ADA diet

## 2018-01-21 ENCOUNTER — LAB CONVERSION - ENCOUNTER (OUTPATIENT)
Dept: OTHER | Facility: OTHER | Age: 58
End: 2018-01-21

## 2018-01-21 LAB
A/G RATIO (HISTORICAL): 1.6 (CALC) (ref 1–2.5)
ALBUMIN SERPL BCP-MCNC: 4.3 G/DL (ref 3.6–5.1)
ALP SERPL-CCNC: 71 U/L (ref 33–130)
ALT SERPL W P-5'-P-CCNC: 9 U/L (ref 6–29)
AST SERPL W P-5'-P-CCNC: 13 U/L (ref 10–35)
BASOPHILS # BLD AUTO: 0.3 %
BASOPHILS # BLD AUTO: 20 CELLS/UL (ref 0–200)
BILIRUB SERPL-MCNC: 0.4 MG/DL (ref 0.2–1.2)
BUN SERPL-MCNC: 21 MG/DL (ref 7–25)
BUN/CREA RATIO (HISTORICAL): ABNORMAL (CALC) (ref 6–22)
CALCIUM SERPL-MCNC: 9.6 MG/DL (ref 8.6–10.4)
CHLORIDE SERPL-SCNC: 102 MMOL/L (ref 98–110)
CO2 SERPL-SCNC: 27 MMOL/L (ref 20–31)
CREAT SERPL-MCNC: 1 MG/DL (ref 0.5–1.05)
DEPRECATED RDW RBC AUTO: 13.8 % (ref 11–15)
EGFR AFRICAN AMERICAN (HISTORICAL): 73 ML/MIN/1.73M2
EGFR-AMERICAN CALC (HISTORICAL): 63 ML/MIN/1.73M2
EOSINOPHIL # BLD AUTO: 0 %
EOSINOPHIL # BLD AUTO: 0 CELLS/UL (ref 15–500)
GAMMA GLOBULIN (HISTORICAL): 2.7 G/DL (CALC) (ref 1.9–3.7)
GLUCOSE (HISTORICAL): 114 MG/DL (ref 65–99)
HCT VFR BLD AUTO: 39.7 % (ref 35–45)
HGB BLD-MCNC: 12.9 G/DL (ref 11.7–15.5)
LYMPHOCYTES # BLD AUTO: 1201 CELLS/UL (ref 850–3900)
LYMPHOCYTES # BLD AUTO: 18.2 %
MCH RBC QN AUTO: 26.8 PG (ref 27–33)
MCHC RBC AUTO-ENTMCNC: 32.5 G/DL (ref 32–36)
MCV RBC AUTO: 82.4 FL (ref 80–100)
MONOCYTES # BLD AUTO: 462 CELLS/UL (ref 200–950)
MONOCYTES (HISTORICAL): 7 %
NEUTROPHILS # BLD AUTO: 4917 CELLS/UL (ref 1500–7800)
NEUTROPHILS # BLD AUTO: 74.5 %
PLATELET # BLD AUTO: 218 THOUSAND/UL (ref 140–400)
PMV BLD AUTO: 9.8 FL (ref 7.5–12.5)
POTASSIUM SERPL-SCNC: 4.3 MMOL/L (ref 3.5–5.3)
RBC # BLD AUTO: 4.82 MILLION/UL (ref 3.8–5.1)
SODIUM SERPL-SCNC: 138 MMOL/L (ref 135–146)
TOTAL PROTEIN (HISTORICAL): 7 G/DL (ref 6.1–8.1)
WBC # BLD AUTO: 6.6 THOUSAND/UL (ref 3.8–10.8)

## 2018-01-22 ENCOUNTER — LAB CONVERSION - ENCOUNTER (OUTPATIENT)
Dept: OTHER | Facility: OTHER | Age: 58
End: 2018-01-22

## 2018-01-22 LAB
A/G RATIO (HISTORICAL): 1.6 (CALC) (ref 1–2.5)
ALBUMIN SERPL BCP-MCNC: 4.3 G/DL (ref 3.6–5.1)
ALP SERPL-CCNC: 71 U/L (ref 33–130)
ALT SERPL W P-5'-P-CCNC: 9 U/L (ref 6–29)
AST SERPL W P-5'-P-CCNC: 13 U/L (ref 10–35)
BASOPHILS # BLD AUTO: 0.3 %
BASOPHILS # BLD AUTO: 20 CELLS/UL (ref 0–200)
BILIRUB SERPL-MCNC: 0.4 MG/DL (ref 0.2–1.2)
BUN SERPL-MCNC: 21 MG/DL (ref 7–25)
BUN/CREA RATIO (HISTORICAL): ABNORMAL (CALC) (ref 6–22)
CA 125 (HISTORICAL): 5 U/ML
CA 15-3 (HISTORICAL): 14 U/ML
CA 27.29 (HISTORICAL): 24 U/ML
CALCIUM SERPL-MCNC: 9.6 MG/DL (ref 8.6–10.4)
CHLORIDE SERPL-SCNC: 102 MMOL/L (ref 98–110)
CO2 SERPL-SCNC: 27 MMOL/L (ref 20–31)
CREAT SERPL-MCNC: 1 MG/DL (ref 0.5–1.05)
DEPRECATED RDW RBC AUTO: 13.8 % (ref 11–15)
EGFR AFRICAN AMERICAN (HISTORICAL): 73 ML/MIN/1.73M2
EGFR-AMERICAN CALC (HISTORICAL): 63 ML/MIN/1.73M2
EOSINOPHIL # BLD AUTO: 0 %
EOSINOPHIL # BLD AUTO: 0 CELLS/UL (ref 15–500)
GAMMA GLOBULIN (HISTORICAL): 2.7 G/DL (CALC) (ref 1.9–3.7)
GLUCOSE (HISTORICAL): 114 MG/DL (ref 65–99)
HCT VFR BLD AUTO: 39.7 % (ref 35–45)
HGB BLD-MCNC: 12.9 G/DL (ref 11.7–15.5)
LYMPHOCYTES # BLD AUTO: 1201 CELLS/UL (ref 850–3900)
LYMPHOCYTES # BLD AUTO: 18.2 %
MCH RBC QN AUTO: 26.8 PG (ref 27–33)
MCHC RBC AUTO-ENTMCNC: 32.5 G/DL (ref 32–36)
MCV RBC AUTO: 82.4 FL (ref 80–100)
MONOCYTES # BLD AUTO: 462 CELLS/UL (ref 200–950)
MONOCYTES (HISTORICAL): 7 %
NEUTROPHILS # BLD AUTO: 4917 CELLS/UL (ref 1500–7800)
NEUTROPHILS # BLD AUTO: 74.5 %
PLATELET # BLD AUTO: 218 THOUSAND/UL (ref 140–400)
PMV BLD AUTO: 9.8 FL (ref 7.5–12.5)
POTASSIUM SERPL-SCNC: 4.3 MMOL/L (ref 3.5–5.3)
RBC # BLD AUTO: 4.82 MILLION/UL (ref 3.8–5.1)
SODIUM SERPL-SCNC: 138 MMOL/L (ref 135–146)
TOTAL PROTEIN (HISTORICAL): 7 G/DL (ref 6.1–8.1)
WBC # BLD AUTO: 6.6 THOUSAND/UL (ref 3.8–10.8)

## 2018-01-23 ENCOUNTER — GENERIC CONVERSION - ENCOUNTER (OUTPATIENT)
Dept: OTHER | Facility: OTHER | Age: 58
End: 2018-01-23

## 2018-01-23 ENCOUNTER — LAB CONVERSION - ENCOUNTER (OUTPATIENT)
Dept: OTHER | Facility: OTHER | Age: 58
End: 2018-01-23

## 2018-01-23 VITALS
HEIGHT: 63 IN | TEMPERATURE: 98.3 F | DIASTOLIC BLOOD PRESSURE: 72 MMHG | HEART RATE: 72 BPM | WEIGHT: 121 LBS | SYSTOLIC BLOOD PRESSURE: 134 MMHG | BODY MASS INDEX: 21.44 KG/M2 | RESPIRATION RATE: 16 BRPM

## 2018-01-23 LAB
AMYLASE (HISTORICAL): 51 U/L (ref 21–101)
CREATININE, RANDOM URINE (HISTORICAL): 46 MG/DL (ref 20–320)
HBA1C MFR BLD HPLC: 5.6 % OF TOTAL HGB
LIPASE SERPL-CCNC: 61 U/L (ref 7–60)
MAGNESIUM, UR (HISTORICAL): 0.2 MG/DL
MICROALBUMIN/CREATININE RATIO (HISTORICAL): 4 MCG/MG CREAT

## 2018-01-24 NOTE — RESULT NOTES
Discussion/Summary   labs acceptable will discuss in detail at follow up appt     Verified Results  (Q) MICROALBUMIN, RANDOM URINE (W/CREATININE) [id number] 07:04AM Corie Smalls     Test Name Result Flag Reference   CREATININE, RANDOM URINE 46 mg/dL     MICROALBUMIN 0 2 mg/dL     Reference Range  Not established   MICROALBUMIN/CREATININE$RATIO, RANDOM URINE 4 mcg/mg creat  <30   The ADA defines abnormalities in albumin  excretion as follows:     Category         Result (mcg/mg creatinine)     Normal                    <30  Microalbuminuria            Clinical albuminuria   > OR = 300     The ADA recommends that at least two of three  specimens collected within a 3-6 month period be  abnormal before considering a patient to be  within a diagnostic category  (Q) AMYLASE [id number] 07:04AM Corie Smalls     Test Name Result Flag Reference   AMYLASE 51 U/L       (Q) HEMOGLOBIN A1c 20Jan2018 07:04AM Corie Smalls     Test Name Result Flag Reference   HEMOGLOBIN A1c 5 6 % of total Hgb  <5 7   For the purpose of screening for the presence of  diabetes:     <5 7%       Consistent with the absence of diabetes  5 7-6 4%    Consistent with increased risk for diabetes              (prediabetes)  > or =6 5%  Consistent with diabetes     This assay result is consistent with a decreased risk  of diabetes  Currently, no consensus exists regarding use of  hemoglobin A1c for diagnosis of diabetes in children  According to American Diabetes Association (ADA)  guidelines, hemoglobin A1c <7 0% represents optimal  control in non-pregnant diabetic patients  Different  metrics may apply to specific patient populations  Standards of Medical Care in Diabetes(ADA)       (1) LIPASE 20Jan2018 07:04AM Corie Loyaanant   REPORT COMMENT:  FASTING:YES     Test Name Result Flag Reference   LIPASE 61 U/L H 7-60

## 2018-01-29 ENCOUNTER — OFFICE VISIT (OUTPATIENT)
Dept: FAMILY MEDICINE CLINIC | Facility: CLINIC | Age: 58
End: 2018-01-29
Payer: COMMERCIAL

## 2018-01-29 ENCOUNTER — OFFICE VISIT (OUTPATIENT)
Dept: HEMATOLOGY ONCOLOGY | Facility: MEDICAL CENTER | Age: 58
End: 2018-01-29
Payer: COMMERCIAL

## 2018-01-29 VITALS
HEART RATE: 68 BPM | BODY MASS INDEX: 21.26 KG/M2 | SYSTOLIC BLOOD PRESSURE: 142 MMHG | HEIGHT: 63 IN | DIASTOLIC BLOOD PRESSURE: 72 MMHG | WEIGHT: 120 LBS | TEMPERATURE: 98.4 F | RESPIRATION RATE: 16 BRPM

## 2018-01-29 VITALS
HEART RATE: 75 BPM | TEMPERATURE: 99.5 F | SYSTOLIC BLOOD PRESSURE: 136 MMHG | DIASTOLIC BLOOD PRESSURE: 76 MMHG | BODY MASS INDEX: 21.6 KG/M2 | WEIGHT: 120 LBS

## 2018-01-29 DIAGNOSIS — I10 BENIGN ESSENTIAL HYPERTENSION: ICD-10-CM

## 2018-01-29 DIAGNOSIS — C50.911 MALIGNANT NEOPLASM OF RIGHT BREAST IN FEMALE, ESTROGEN RECEPTOR POSITIVE, UNSPECIFIED SITE OF BREAST (HCC): Primary | ICD-10-CM

## 2018-01-29 DIAGNOSIS — E11.36 TYPE 2 DIABETES MELLITUS WITH DIABETIC CATARACT, WITHOUT LONG-TERM CURRENT USE OF INSULIN (HCC): Primary | ICD-10-CM

## 2018-01-29 DIAGNOSIS — E78.2 MIXED HYPERLIPIDEMIA: ICD-10-CM

## 2018-01-29 DIAGNOSIS — Z17.0 MALIGNANT NEOPLASM OF RIGHT BREAST IN FEMALE, ESTROGEN RECEPTOR POSITIVE, UNSPECIFIED SITE OF BREAST (HCC): Primary | ICD-10-CM

## 2018-01-29 DIAGNOSIS — C50.919 ADENOCARCINOMA OF BREAST, UNSPECIFIED LATERALITY (HCC): ICD-10-CM

## 2018-01-29 PROBLEM — J30.9 ALLERGIC RHINITIS: Status: ACTIVE | Noted: 2017-05-22

## 2018-01-29 PROCEDURE — 99214 OFFICE O/P EST MOD 30 MIN: CPT | Performed by: FAMILY MEDICINE

## 2018-01-29 PROCEDURE — 99214 OFFICE O/P EST MOD 30 MIN: CPT | Performed by: INTERNAL MEDICINE

## 2018-01-29 PROCEDURE — 3725F SCREEN DEPRESSION PERFORMED: CPT | Performed by: FAMILY MEDICINE

## 2018-01-29 RX ORDER — ZINC OXIDE 13 %
CREAM (GRAM) TOPICAL DAILY
COMMUNITY

## 2018-01-29 RX ORDER — LETROZOLE 2.5 MG/1
1 TABLET, FILM COATED ORAL DAILY
COMMUNITY
End: 2018-08-02 | Stop reason: SDUPTHER

## 2018-01-29 RX ORDER — AMLODIPINE AND OLMESARTAN MEDOXOMIL 5; 40 MG/1; MG/1
TABLET ORAL
COMMUNITY
Start: 2014-08-04 | End: 2018-01-29 | Stop reason: SDUPTHER

## 2018-01-29 RX ORDER — NEBIVOLOL 5 MG/1
5 TABLET ORAL DAILY
Qty: 90 TABLET | Refills: 1 | Status: SHIPPED | OUTPATIENT
Start: 2018-01-29 | End: 2018-06-04 | Stop reason: SDUPTHER

## 2018-01-29 RX ORDER — NEBIVOLOL 5 MG/1
TABLET ORAL DAILY
COMMUNITY
Start: 2017-05-22 | End: 2018-01-29 | Stop reason: SDUPTHER

## 2018-01-29 RX ORDER — NAFTIFINE HYDROCHLORIDE 2 G/100G
GEL TOPICAL
Refills: 0 | COMMUNITY
Start: 2017-11-07 | End: 2020-09-28 | Stop reason: ALTCHOICE

## 2018-01-29 RX ORDER — AMLODIPINE AND OLMESARTAN MEDOXOMIL 5; 40 MG/1; MG/1
1 TABLET ORAL DAILY
Qty: 90 TABLET | Refills: 0 | Status: SHIPPED | OUTPATIENT
Start: 2018-01-29 | End: 2018-06-04 | Stop reason: SDUPTHER

## 2018-01-29 RX ORDER — MENTHOL 5.8 MG/1
LOZENGE ORAL DAILY
COMMUNITY
Start: 2008-02-11

## 2018-01-29 RX ORDER — FLUTICASONE PROPIONATE 50 MCG
2 SPRAY, SUSPENSION (ML) NASAL DAILY
COMMUNITY
Start: 2017-05-22 | End: 2018-05-01 | Stop reason: SDUPTHER

## 2018-01-29 RX ORDER — PRAVASTATIN SODIUM 10 MG
1 TABLET ORAL DAILY
COMMUNITY
Start: 2016-06-06 | End: 2018-01-29 | Stop reason: SDUPTHER

## 2018-01-29 RX ORDER — OMEPRAZOLE 40 MG/1
CAPSULE, DELAYED RELEASE ORAL
Refills: 1 | COMMUNITY
Start: 2017-12-06 | End: 2018-01-29

## 2018-01-29 RX ORDER — PRAVASTATIN SODIUM 10 MG
10 TABLET ORAL DAILY
Qty: 90 TABLET | Refills: 1 | Status: SHIPPED | OUTPATIENT
Start: 2018-01-29 | End: 2018-06-04 | Stop reason: SDUPTHER

## 2018-01-29 NOTE — PROGRESS NOTES
Saida Nelson  1960  [address] [facility] [address] [place] [id number]    Discussion/summary    71-year-old female with history of recurrent right breast cancer presently on Femara  Mrs Nelson (formally Dimple Raddle) feels well and clinically there are no troubling signs or any signs of breast cancer recurrence  Recent laboratory tests were good/acceptable; CA 27, 29 and CA-15-3 as well as the  all within normal limits  The most recent mammogram is good/WNL  Patient is to continue with the self breast exams and yearly mammograms (October 2018 is the next date)  Patient is to return in 6 months with repeat blood work before  We re-discussed what to monitor for in regard to disease progression  Previous DEXA scan demonstrated osteopenia  Patient is on calcium and vitamin D as well as working out  Recently we discussed Prolia - patient deferred  Routine health maintenance and medical care is up-to-date  Patient knows to call if she has any other oncology questions or concerns  Carefully review your medication list and verify that the list is accurate and up-to-date  Please call the oncology office if there are medications missing from the list, medications on the list that you are not currently taking or if there is a dosage or instruction that is different from how you're taking a medication  Chief Complaint   Patient presents with    Breast Cancer     History of Present Illness:    65 female with a complicated past breast cancer history back for follow-up  Patient was first diagnosed in 2003 and underwent a right modified radical mastectomy in December 2003  Pathology results from [facility] in [address] [place] [address] demonstrated invasive ductal carcinoma, 0 5 cm with focal lymphovascular invasion  There was a second right breast mass also invasive, 4 0 by 2 5 cm, moderately differentiated   Patient also had focal DCIS, margins were free of tumor  Right axillary contents demonstrated 4/17 positive lymph nodes  Final stage was IIIA (pT2 pN2a M0)  Patient states that she refused adjuvant chemotherapy at that time  It is never been clear why patient was not offered adjuvant hormonal manipulation  Subsequently patient moved to [place] and in 2007 began to have pain in her right shoulder  Workup demonstrated 2 nodules within the shoulder muscle  One nodule was removed and demonstrated metastatic carcinoma  Patient underwent radiotherapy without complications and was placed on tamoxifen - completed 5 years (started in January 2008)  Patient subsequently began Femara and has completed 5+ years of Femara  Patient returns for follow-up and is accompanied by her   Mrs Nelson states feeling okay, baseline  Patient continues to be active  Appetite is good, weight is stable  No specific problems with the Femara  Routine health maintenance and medical care is up-to-date  As discussed previously, patient has an occasional hot flash - no different than before  Routine health maintenance and medical care is up-to-date  Review of Systems     Constitutional: as noted in HPI  Eyes: No complaints of eye pain, no red eyes, no eyesight problems, no discharge, no dry eyes, no itching of eyes  ENT: no complaints of earache, no loss of hearing, no nose bleeds, no nasal discharge, no sore throat, no hoarseness  Cardiovascular: No complaints of slow heart rate, no fast heart rate, no chest pain, no palpitations, no leg claudication, no lower extremity edema  Respiratory: No complaints of shortness of breath, no wheezing, no cough, no SOB on exertion, no orthopnea, no PND  Gastrointestinal: No complaints of abdominal pain, no constipation, no nausea or vomiting, no diarrhea, no bloody stools  Genitourinary: No complaints of dysuria, no incontinence, no pelvic pain, no dysmenorrhea, no vaginal discharge or bleeding  Musculoskeletal: No complaints of arthralgias, no myalgias, no joint swelling or stiffness, no limb pain or swelling  Integumentary: No complaints of skin rash or lesions, no itching, no skin wounds, no breast pain or lump  Neurological: No complaints of headache, no confusion, no convulsions, no numbness, no dizziness or fainting, no tingling, no limb weakness, no difficulty walking  Psychiatric: Not suicidal, no sleep disturbance, no anxiety or depression, no change in personality, no emotional problems  Endocrine: hot flashes - occasional  Hematologic/Lymphatic: No complaints of swollen glands, no swollen glands in the neck, does not bleed easily, does not bruise easily  Patient Active Problem List   Diagnosis    Adenocarcinoma of breast ([place] [address] )    Allergic rhinitis    Anemia    Anxiety    Benign essential hypertension    Breast cancer ([place] [address] )    Diabetic cataract ([place] [address] )    Diabetic retinopathy ([place] [address] )    Mixed hyperlipidemia    Osteoporosis    Type 2 diabetes with ophthalmic manifestation ([place] [address] )     Past Medical History:   Diagnosis Date    Abnormal liver function test     Acute upper respiratory infection 04/17/2008    Breast cancer ([place] [address] )     Cough 08/07/2008    Diabetes mellitus ([place] [address] )     History of acute sinusitis 09/28/2010    History of diarrhea     History of hypertension     History of motion sickness 08/07/2008    History of orthopnea 01/10/2008     Past Surgical History:   Procedure Laterality Date    BREAST LUMPECTOMY      MASTECTOMY      TOOTH EXTRACTION       Family History   Problem Relation Age of Onset    Hypertension Father     Diabetes Family     Heart disease Family      Social History     Social History    Marital status: /Civil Union     Spouse name: N/A    Number of children: N/A    Years of education: N/A     Occupational History    Not on file       Social History Main Topics    Smoking status: Never Smoker    Smokeless tobacco: Never Used   Lisa Orr Alcohol use Yes      Comment: social drinker    Drug use: Unknown    Sexual activity: Not on file     Other Topics Concern    Not on file     Social History Narrative    No narrative on file       Current Outpatient Prescriptions:     amlodipine-olmesartan (EMANUEL) 5-40 MG, Take 1 tablet by mouth daily for 90 days, Disp: 90 tablet, Rfl: 0    aspirin 81 MG tablet, Take by mouth daily, Disp: , Rfl:     fluticasone (FLONASE) 50 mcg/act nasal spray, 2 sprays into each nostril daily, Disp: , Rfl:     Glucosamine-Chondroitin 250-200 MG TABS, Take by mouth, Disp: , Rfl:     glucose blood test strip, by In Vitro route 5 (five) times a day, Disp: , Rfl:     letrozole (FEMARA) 2 5 mg tablet, Take 1 tablet by mouth daily, Disp: , Rfl:     Multiple Vitamins-Iron (QC DAILY MULTIVITAMINS/IRON) TABS, Take by mouth daily, Disp: , Rfl:     NAFTIN 2 % GEL, APPLY TOPICALLY TWICE DAILY, Disp: , Rfl: 0    nebivolol (BYSTOLIC) 5 mg tablet, Take 1 tablet (5 mg total) by mouth daily for 90 days, Disp: 90 tablet, Rfl: 1    pravastatin (PRAVACHOL) 10 mg tablet, Take 1 tablet (10 mg total) by mouth daily for 90 days, Disp: 90 tablet, Rfl: 1    Probiotic Product (PROBIOTIC DAILY) CAPS, Take by mouth, Disp: , Rfl:     SAXagliptin-MetFORMIN ER (KOMBIGLYZE XR) 5-1000 MG TB24, Take 1 tablet by mouth daily for 90 days, Disp: 90 tablet, Rfl: 1     Allergies   Allergen Reactions    Erythromycin Swelling     Reaction Date: 23Aug2007;     Penicillins Hives     Reaction Date: 23Aug2007;     Codeine Nausea Only     Reaction Date: 23Aug2007;     Sulfate Rash and Fever     Reaction Date: 23Aug2007;      Vitals:    01/29/18 1602   BP: 136/76   Pulse: 75   Temp: 99 5 °F (37 5 °C)     Physical Exam    Constitutional   General appearance: No acute distress, well appearing and well nourished  Eyes   Conjunctiva and lids: No swelling, erythema or discharge  Pupils and irises: Equal, round and reactive to light      Ears, Nose, Mouth, and Throat   External inspection of ears and nose: Normal     Nasal mucosa, septum, and turbinates: Normal without edema or erythema  -Moist, pink, no exudate  Oropharynx: No exudate, mucosa moist, teeth in fair condition  Neck: Supple, no JVD  Pulmonary   Respiratory effort: No increased work of breathing or signs of respiratory distress  Auscultation of lungs: Clear to auscultation  Cardiovascular   Palpation of heart: Normal PMI, no thrills  Auscultation of heart: Normal rate and rhythm, normal S1 and S2, without murmurs  Examination of extremities for edema and/or varicosities: Normal     Carotid pulses: Normal     Abdomen + Bowel sounds, nontender, no hepatosplenomegaly, no rigidity or rebound  Lymphatic   Palpation of lymph nodes in neck: No lymphadenopathy  -No palpable adenopathy in the neck, supraclavicular region, axilla and groin bilaterally  Musculoskeletal   Gait and station: Normal     Digits and nails: Normal without clubbing or cyanosis  Inspection/palpation of joints, bones, and muscles: Normal     Skin   Skin and subcutaneous tissue: Normal without rashes or lesions  -Good color, no petechiae or ecchymoses  Neurologic   Cranial nerves: Cranial nerves 2-12 intact  Reflexes: 2+ and symmetric  Sensation: No sensory loss      Psychiatric   Orientation to person, place, and time: Normal     Mood and affect: Normal     Breasts (female present) right anterior chest wall with well healed scar, previous area of inflammation above the suture line has resolved, no nodules or redness, no axillary adenopathy bilaterally    Performance Status: ECOG = 0    Labs:    1/20/18 WBC = 6 6 hemoglobin = 12 9 hematocrit = 39 7 platelet = 283 neutrophil = 75% BUN = 21          creatinine = 1 00 calcium = 9 6 LFTs WNL CA 27, 29 = 24     CA 15-3 = 14      = 5    8/26/17 BUN = 24 creatinine = 1 07 LFTs WNL calcium = 9 8 WBC = 7 1 hemoglobin = 13 hematocrit = 39 8 platelet = 828  = 6  5/19/17 CA 15-3 = 17 CA 27, 29 = 35  = 5 BUN = 24 creatinine = 1 11 LFTs WNL calcium = 9 6 WBC = 5 8 hemoglobin = 12 6 hematocrit = 37 8 platelet = 519  4/50/68 WBC = 7 2 hemoglobin = 12 7 hematocrit = 39 platelet = 090 BUN = 19 creatinine = 1 06 calcium = 9 6 LFTs WNL CA-125 = 7 CA 15-3 = 28 CA 27, 29 = 59  10/1/16 BUN = 21 creatinine = 1 01 calcium = 9 2 LFTs WNL WBC = 7 9 hemoglobin = 12 1 hematocrit = 38 platelet = 0:52 CA 59-3 = 15 CA 27, 29 = 40  = 7  5/21/16 BUN = 22 creatinine = 1 0 calcium = 9 5 LFTs WNL WBC = 5 7 hemoglobin = 11 8 hematocrit = 35 5 platelet = 982 CA 83-6 = 23 CA-125 = 7 CA 27, 29 = 41  2/20/16 WBC = 6 1 hemoglobin = 12 hematocrit = 37 platelet = 651 BARRIENTOS-717 = 6 CA 27, 29 = 47 LFTs WNL calcium = 9 7 BUN = 19 creatinine = 1 08    Imaging        10/13/17 diagnostic left mammogram = category 1, negative  10/10/16 left unilateral digital mammogram with CAD was read as category 1, negative  3/3/17 DEXA scan demonstrated osteopenia  8/7/15 left unilateral digital mammogram was category 1, negative      3/12/15 DEXA scan demonstrated WHO classification osteopenia  8/1/14 left digital diagnostic mammogram was category 1, negative  Pathology    6/25/15 BRCA1/2 sequencing and deletion/duplication analysis = negative

## 2018-01-29 NOTE — PROGRESS NOTES
Assessment/Plan:    No problem-specific Assessment & Plan notes found for this encounter  Diagnoses and all orders for this visit:    Type 2 diabetes mellitus with diabetic cataract, without long-term current use of insulin (HCC)  -     amlodipine-olmesartan (EMANUEL) 5-40 MG; Take 1 tablet by mouth daily for 90 days  -     SAXagliptin-MetFORMIN ER (KOMBIGLYZE XR) 5-1000 MG TB24; Take 1 tablet by mouth daily for 90 days  -     Hemoglobin A1c; Future  -     Comprehensive metabolic panel; Future  -     Hemoglobin A1c  -     Comprehensive metabolic panel    Benign essential hypertension  -     amlodipine-olmesartan (EMANUEL) 5-40 MG; Take 1 tablet by mouth daily for 90 days  -     nebivolol (BYSTOLIC) 5 mg tablet; Take 1 tablet (5 mg total) by mouth daily for 90 days    Adenocarcinoma of breast, unspecified laterality (HCC)    Mixed hyperlipidemia  -     pravastatin (PRAVACHOL) 10 mg tablet; Take 1 tablet (10 mg total) by mouth daily for 90 days  -     Lipid panel; Future  -     Comprehensive metabolic panel; Future  -     Lipase; Future  -     Lipid panel  -     Comprehensive metabolic panel  -     Lipase    Other orders  -     Discontinue: amlodipine-olmesartan (EMANUEL) 5-40 MG; Take by mouth  -     aspirin 81 MG tablet; Take by mouth daily  -     Glucosamine-Chondroitin 250-200 MG TABS; Take by mouth  -     Discontinue: nebivolol (BYSTOLIC) 5 mg tablet; Take by mouth daily  -     fluticasone (FLONASE) 50 mcg/act nasal spray; 2 sprays into each nostril daily  -     glucose blood test strip; by In Vitro route 5 (five) times a day  -     Discontinue: SAXagliptin-MetFORMIN ER (KOMBIGLYZE XR) 5-1000 MG TB24; Take 1 tablet by mouth daily  -     letrozole (FEMARA) 2 5 mg tablet;  Take 1 tablet by mouth daily  -     Multiple Vitamins-Iron (QC DAILY MULTIVITAMINS/IRON) TABS; Take by mouth daily  -     NAFTIN 2 % GEL; APPLY TOPICALLY TWICE DAILY  -     omeprazole (PriLOSEC) 40 MG capsule; TAKE 1 CAPSULE DAILY 1 HOUR PRIOR TO MEAL  -     Discontinue: pravastatin (PRAVACHOL) 10 mg tablet; Take 1 tablet by mouth daily  -     Probiotic Product (PROBIOTIC DAILY) CAPS; Take by mouth          Patient Instructions   10% - bad control"> 10% - bad control,Hemoglobin A1c (HbA1c) greater than 10% indicating poor diabetic control,Haemoglobin A1c greater than 10% indicating poor diabetic control">   Diabetes Mellitus Type 2 in Adults, Ambulatory Care   GENERAL INFORMATION:   Diabetes mellitus type 2  is a disease that affects how your body uses glucose (sugar)  Insulin helps move sugar out of the blood so it can be used for energy  Normally, when the blood sugar level increases, the pancreas makes more insulin  Type 2 diabetes develops because either the body cannot make enough insulin, or it cannot use the insulin correctly  After many years, your pancreas may stop making insulin  Common symptoms include the following:   · More hunger or thirst than usual     · Frequent urination     · Weight loss without trying     · Blurred vision  Seek immediate care for the following symptoms:   · Severe abdominal pain, or pain that spreads to your back  You may also be vomiting  · Trouble staying awake or focusing    · Shaking or sweating    · Blurred or double vision    · Breath has a fruity, sweet smell    · Breathing is deep and labored, or rapid and shallow    · Heartbeat is fast and weak  Treatment for diabetes mellitus type 2  includes keeping your blood sugar at a normal level  You must eat the right foods, and exercise regularly  You may also need medicine if you cannot control your blood sugar level with nutrition and exercise  Manage diabetes mellitus type 2:   · Check your blood sugar level  You will be taught how to check a small drop of blood in a glucose monitor  Ask your healthcare provider when and how often to check during the day  Ask your healthcare provider what your blood sugar levels should be when you check them       · Keep track of carbohydrates (sugar and starchy foods)  Your blood sugar level can get too high if you eat too many carbohydrates  Your dietitian will help you plan meals and snacks that have the right amount of carbohydrates  · Eat low-fat foods  Some examples are skinless chicken and low-fat milk  · Eat less sodium (salt)  Some examples of high-sodium foods to limit are soy sauce, potato chips, and soup  Do not add salt to food you cook  Limit your use of table salt  · Eat high-fiber foods  Foods that are a good source of fiber include vegetables, whole grain bread, and beans  · Limit alcohol  Alcohol affects your blood sugar level and can make it harder to manage your diabetes  Women should limit alcohol to 1 drink a day  Men should limit alcohol to 2 drinks a day  A drink of alcohol is 12 ounces of beer, 5 ounces of wine, or 1½ ounces of liquor  · Get regular exercise  Exercise can help keep your blood sugar level steady, decrease your risk of heart disease, and help you lose weight  Exercise for at least 30 minutes, 5 days a week  Include muscle strengthening activities 2 days each week  Work with your healthcare provider to create an exercise plan  · Check your feet each day  for injuries or open sores  Ask your healthcare provider for activities you can do if you have an open sore  · Quit smoking  If you smoke, it is never too late to quit  Smoking can worsen the problems that may occur with diabetes  Ask your healthcare provider for information about how to stop smoking if you are having trouble quitting  · Ask about your weight:  Ask healthcare providers if you need to lose weight, and how much to lose  Ask them to help you with a weight loss program  Even a 10 to 15 pound weight loss can help you manage your blood sugar level  · Carry medical alert identification  Wear medical alert jewelry or carry a card that says you have diabetes   Ask your healthcare provider where to get these items      · Ask about vaccines  Diabetes puts you at risk of serious illness if you get the flu, pneumonia, or hepatitis  Ask your healthcare provider if you should get a flu, pneumonia, or hepatitis B vaccine, and when to get the vaccine  Follow up with your healthcare provider as directed:  Write down your questions so you remember to ask them during your visits  CARE AGREEMENT:   You have the right to help plan your care  Learn about your health condition and how it may be treated  Discuss treatment options with your caregivers to decide what care you want to receive  You always have the right to refuse treatment  The above information is an  only  It is not intended as medical advice for individual conditions or treatments  Talk to your doctor, nurse or pharmacist before following any medical regimen to see if it is safe and effective for you  © 2014 [address] is for End User's use only and may not be sold, redistributed or otherwise used for commercial purposes  All illustrations and images included in CareNotes® are the copyrighted property of [employer]  or [employer]  Cristóbal in about 4 months (around 5/29/2018) for Next scheduled follow up  Subjective:      Patient ID: Pedro An is a 65 y o  female  Chief Complaint   Patient presents with    Follow-up     review new labs       Pt is here to go over her labs  Pt just had them drawn  No new complaints  No chest pain no sob  No polyuri no polydipsia    Eye exam march 17th    Diabetes   She presents for her follow-up diabetic visit  She has type 2 diabetes mellitus  Her disease course has been stable  Pertinent negatives for hypoglycemia include no confusion, dizziness, headaches, hunger, mood changes, nervousness/anxiousness, pallor, seizures, sleepiness, speech difficulty, sweats or tremors   Pertinent negatives for diabetes include no blurred vision, no chest pain, no fatigue, no foot paresthesias, no foot ulcerations, no polydipsia, no polyphagia, no polyuria, no visual change, no weakness and no weight loss  Pertinent negatives for hypoglycemia complications include no blackouts, no hospitalization, no nocturnal hypoglycemia, no required assistance and no required glucagon injection  Symptoms are stable  Pertinent negatives for diabetic complications include no autonomic neuropathy, CVA, heart disease, impotence, nephropathy, peripheral neuropathy, PVD or retinopathy  Risk factors for coronary artery disease include diabetes mellitus, dyslipidemia and hypertension  Current diabetic treatment includes oral agent (dual therapy)  She is compliant with treatment all of the time  Her weight is stable  Meal planning includes avoidance of concentrated sweets  She participates in exercise daily  There is no change in her home blood glucose trend  An ACE inhibitor/angiotensin II receptor blocker is being taken  She sees a podiatrist Eye exam is current  The following portions of the patient's history were reviewed and updated as appropriate: allergies, current medications, past family history, past medical history, past social history, past surgical history and problem list     Review of Systems   Constitutional: Negative for activity change, appetite change, fatigue and weight loss  HENT: Negative for congestion, ear pain, rhinorrhea and sinus pain  Eyes: Negative for blurred vision, photophobia, pain, discharge, redness and visual disturbance  Respiratory: Negative for apnea and chest tightness  Cardiovascular: Negative for chest pain  Gastrointestinal: Negative for abdominal distention, abdominal pain and constipation  Endocrine: Negative for polydipsia, polyphagia and polyuria  Genitourinary: Negative for difficulty urinating and impotence  Musculoskeletal: Negative for arthralgias, back pain, gait problem and joint swelling  Skin: Negative for pallor  Neurological: Negative for dizziness, tremors, seizures, speech difficulty, weakness and headaches  Psychiatric/Behavioral: Negative for confusion  The patient is not nervous/anxious  Current Outpatient Prescriptions   Medication Sig Dispense Refill    amlodipine-olmesartan (EMANUEL) 5-40 MG Take 1 tablet by mouth daily for 90 days 90 tablet 0    aspirin 81 MG tablet Take by mouth daily      fluticasone (FLONASE) 50 mcg/act nasal spray 2 sprays into each nostril daily      Glucosamine-Chondroitin 250-200 MG TABS Take by mouth      glucose blood test strip by In Vitro route 5 (five) times a day      letrozole (FEMARA) 2 5 mg tablet Take 1 tablet by mouth daily      Multiple Vitamins-Iron (QC DAILY MULTIVITAMINS/IRON) TABS Take by mouth daily      NAFTIN 2 % GEL APPLY TOPICALLY TWICE DAILY  0    nebivolol (BYSTOLIC) 5 mg tablet Take 1 tablet (5 mg total) by mouth daily for 90 days 90 tablet 1    pravastatin (PRAVACHOL) 10 mg tablet Take 1 tablet (10 mg total) by mouth daily for 90 days 90 tablet 1    Probiotic Product (PROBIOTIC DAILY) CAPS Take by mouth      SAXagliptin-MetFORMIN ER (KOMBIGLYZE XR) 5-1000 MG TB24 Take 1 tablet by mouth daily for 90 days 90 tablet 1    omeprazole (PriLOSEC) 40 MG capsule TAKE 1 CAPSULE DAILY 1 HOUR PRIOR TO MEAL  1     No current facility-administered medications for this visit  Objective:    /72   Pulse 68   Temp 98 4 °F (36 9 °C)   Resp 16   Ht 5' 2 5" (1 588 m)   Wt 54 4 kg (120 lb)   BMI 21 60 kg/m²             Physical Exam   Constitutional: She appears well-developed and well-nourished  No distress  HENT:   Head: Normocephalic  Eyes: Conjunctivae and EOM are normal  Pupils are equal, round, and reactive to light  Right eye exhibits no discharge  Left eye exhibits no discharge  No scleral icterus  Neck: Normal range of motion  Neck supple  No JVD present  No tracheal deviation present  No thyromegaly present     Cardiovascular: Normal rate, regular rhythm and normal heart sounds  Exam reveals no gallop  No murmur heard  Pulmonary/Chest: Effort normal  No stridor  Abdominal: Soft  Bowel sounds are normal    Musculoskeletal: She exhibits no edema  Neurological: She is alert  Skin: Skin is warm  She is not diaphoretic  Nursing note and vitals reviewed               Abhijeet Harvey DO

## 2018-01-29 NOTE — PATIENT INSTRUCTIONS
10% - bad control"> 10% - bad control,Hemoglobin A1c (HbA1c) greater than 10% indicating poor diabetic control,Haemoglobin A1c greater than 10% indicating poor diabetic control">   Diabetes Mellitus Type 2 in Adults, Ambulatory Care   GENERAL INFORMATION:   Diabetes mellitus type 2  is a disease that affects how your body uses glucose (sugar)  Insulin helps move sugar out of the blood so it can be used for energy  Normally, when the blood sugar level increases, the pancreas makes more insulin  Type 2 diabetes develops because either the body cannot make enough insulin, or it cannot use the insulin correctly  After many years, your pancreas may stop making insulin  Common symptoms include the following:   · More hunger or thirst than usual     · Frequent urination     · Weight loss without trying     · Blurred vision  Seek immediate care for the following symptoms:   · Severe abdominal pain, or pain that spreads to your back  You may also be vomiting  · Trouble staying awake or focusing    · Shaking or sweating    · Blurred or double vision    · Breath has a fruity, sweet smell    · Breathing is deep and labored, or rapid and shallow    · Heartbeat is fast and weak  Treatment for diabetes mellitus type 2  includes keeping your blood sugar at a normal level  You must eat the right foods, and exercise regularly  You may also need medicine if you cannot control your blood sugar level with nutrition and exercise  Manage diabetes mellitus type 2:   · Check your blood sugar level  You will be taught how to check a small drop of blood in a glucose monitor  Ask your healthcare provider when and how often to check during the day  Ask your healthcare provider what your blood sugar levels should be when you check them  · Keep track of carbohydrates (sugar and starchy foods)  Your blood sugar level can get too high if you eat too many carbohydrates   Your dietitian will help you plan meals and snacks that have the right amount of carbohydrates  · Eat low-fat foods  Some examples are skinless chicken and low-fat milk  · Eat less sodium (salt)  Some examples of high-sodium foods to limit are soy sauce, potato chips, and soup  Do not add salt to food you cook  Limit your use of table salt  · Eat high-fiber foods  Foods that are a good source of fiber include vegetables, whole grain bread, and beans  · Limit alcohol  Alcohol affects your blood sugar level and can make it harder to manage your diabetes  Women should limit alcohol to 1 drink a day  Men should limit alcohol to 2 drinks a day  A drink of alcohol is 12 ounces of beer, 5 ounces of wine, or 1½ ounces of liquor  · Get regular exercise  Exercise can help keep your blood sugar level steady, decrease your risk of heart disease, and help you lose weight  Exercise for at least 30 minutes, 5 days a week  Include muscle strengthening activities 2 days each week  Work with your healthcare provider to create an exercise plan  · Check your feet each day  for injuries or open sores  Ask your healthcare provider for activities you can do if you have an open sore  · Quit smoking  If you smoke, it is never too late to quit  Smoking can worsen the problems that may occur with diabetes  Ask your healthcare provider for information about how to stop smoking if you are having trouble quitting  · Ask about your weight:  Ask healthcare providers if you need to lose weight, and how much to lose  Ask them to help you with a weight loss program  Even a 10 to 15 pound weight loss can help you manage your blood sugar level  · Carry medical alert identification  Wear medical alert jewelry or carry a card that says you have diabetes  Ask your healthcare provider where to get these items  · Ask about vaccines  Diabetes puts you at risk of serious illness if you get the flu, pneumonia, or hepatitis   Ask your healthcare provider if you should get a flu, pneumonia, or hepatitis B vaccine, and when to get the vaccine  Follow up with your healthcare provider as directed:  Write down your questions so you remember to ask them during your visits  CARE AGREEMENT:   You have the right to help plan your care  Learn about your health condition and how it may be treated  Discuss treatment options with your caregivers to decide what care you want to receive  You always have the right to refuse treatment  The above information is an  only  It is not intended as medical advice for individual conditions or treatments  Talk to your doctor, nurse or pharmacist before following any medical regimen to see if it is safe and effective for you  © 2014 [address] is for End User's use only and may not be sold, redistributed or otherwise used for commercial purposes  All illustrations and images included in CareNotes® are the copyrighted property of A D A M , Inc  or [employer]

## 2018-01-29 NOTE — PROGRESS NOTES
Diabetic Foot Exam    Patient's shoes and socks removed  Right Foot/Ankle   Right Foot Inspection  Skin Exam: skin normal and skin intact no dry skin, no warmth, no callus, no erythema, no maceration, no abnormal color, no pre-ulcer, no ulcer and no callus                          Toe Exam: ROM and strength within normal limits  Sensory   Vibration: intact  Proprioception: intact   Monofilament testing: intact  Vascular  Capillary refills: < 3 seconds  The right DP pulse is 2+  The right PT pulse is 2+  Left Foot/Ankle  Left Foot Inspection  Skin Exam: skin normal and skin intactno dry skin, no warmth, no erythema, no maceration, normal color, no pre-ulcer, no ulcer and no callus                         Toe Exam: ROM and strength within normal limits                   Sensory   Vibration: intact  Proprioception: intact  Monofilament: intact  Vascular  Capillary refills: < 3 seconds  The left DP pulse is 2+  The left PT pulse is 2+  Assign Risk Category:  No deformity present;  No loss of protective sensation;        Risk: 0

## 2018-02-28 DIAGNOSIS — F41.9 ANXIETY DISORDER: ICD-10-CM

## 2018-02-28 DIAGNOSIS — R97.8 OTHER ABNORMAL TUMOR MARKERS: ICD-10-CM

## 2018-02-28 DIAGNOSIS — C50.919 MALIGNANT NEOPLASM OF FEMALE BREAST (HCC): ICD-10-CM

## 2018-03-17 LAB
LEFT EYE DIABETIC RETINOPATHY: NORMAL
RIGHT EYE DIABETIC RETINOPATHY: NORMAL

## 2018-03-17 PROCEDURE — 2022F DILAT RTA XM EVC RTNOPTHY: CPT | Performed by: FAMILY MEDICINE

## 2018-04-13 ENCOUNTER — TELEPHONE (OUTPATIENT)
Dept: FAMILY MEDICINE CLINIC | Facility: CLINIC | Age: 58
End: 2018-04-13

## 2018-04-13 DIAGNOSIS — Z11.59 NEED FOR HEPATITIS B SCREENING TEST: Primary | ICD-10-CM

## 2018-04-20 ENCOUNTER — TELEPHONE (OUTPATIENT)
Dept: FAMILY MEDICINE CLINIC | Facility: CLINIC | Age: 58
End: 2018-04-20

## 2018-04-20 DIAGNOSIS — Z11.59 NEED FOR HEPATITIS C SCREENING TEST: Primary | ICD-10-CM

## 2018-04-20 NOTE — TELEPHONE ENCOUNTER
Patient called for bloodwork recently and said she also wanted Hep C testing  Can we please order Hep C bloodwork for her?     Please fax to [phone]

## 2018-05-01 DIAGNOSIS — J30.1 SEASONAL ALLERGIC RHINITIS DUE TO POLLEN: Primary | ICD-10-CM

## 2018-05-01 RX ORDER — FLUTICASONE PROPIONATE 50 MCG
SPRAY, SUSPENSION (ML) NASAL
Qty: 48 G | Refills: 1 | Status: SHIPPED | OUTPATIENT
Start: 2018-05-01 | End: 2018-10-28 | Stop reason: SDUPTHER

## 2018-05-21 LAB
ALBUMIN SERPL-MCNC: 4.2 G/DL (ref 3.6–5.1)
ALBUMIN/GLOB SERPL: 1.3 (CALC) (ref 1–2.5)
ALP SERPL-CCNC: 83 U/L (ref 33–130)
ALT SERPL-CCNC: 11 U/L (ref 6–29)
AST SERPL-CCNC: 13 U/L (ref 10–35)
BASOPHILS # BLD AUTO: 28 CELLS/UL (ref 0–200)
BASOPHILS NFR BLD AUTO: 0.4 %
BILIRUB SERPL-MCNC: 0.5 MG/DL (ref 0.2–1.2)
BUN SERPL-MCNC: 21 MG/DL (ref 7–25)
BUN/CREAT SERPL: ABNORMAL (CALC) (ref 6–22)
CALCIUM SERPL-MCNC: 9.3 MG/DL (ref 8.6–10.4)
CANCER AG125 SERPL-ACNC: 5 U/ML
CANCER AG15-3 SERPL-ACNC: 17 U/ML
CHLORIDE SERPL-SCNC: 101 MMOL/L (ref 98–110)
CHOLEST SERPL-MCNC: 185 MG/DL
CHOLEST/HDLC SERPL: 3.9 (CALC)
CO2 SERPL-SCNC: 26 MMOL/L (ref 20–31)
CREAT SERPL-MCNC: 1.05 MG/DL (ref 0.5–1.05)
EOSINOPHIL # BLD AUTO: 0 CELLS/UL (ref 15–500)
EOSINOPHIL NFR BLD AUTO: 0 %
ERYTHROCYTE [DISTWIDTH] IN BLOOD BY AUTOMATED COUNT: 14.4 % (ref 11–15)
GLOBULIN SER CALC-MCNC: 3.3 G/DL (CALC) (ref 1.9–3.7)
GLUCOSE SERPL-MCNC: 130 MG/DL (ref 65–99)
HBA1C MFR BLD: 5.8 % OF TOTAL HGB
HCT VFR BLD AUTO: 39.8 % (ref 35–45)
HDLC SERPL-MCNC: 48 MG/DL
HGB BLD-MCNC: 13.1 G/DL (ref 11.7–15.5)
LDLC SERPL CALC-MCNC: 114 MG/DL (CALC)
LIPASE SERPL-CCNC: 59 U/L (ref 7–60)
LYMPHOCYTES # BLD AUTO: 1967 CELLS/UL (ref 850–3900)
LYMPHOCYTES NFR BLD AUTO: 28.1 %
MCH RBC QN AUTO: 27.1 PG (ref 27–33)
MCHC RBC AUTO-ENTMCNC: 32.9 G/DL (ref 32–36)
MCV RBC AUTO: 82.2 FL (ref 80–100)
MONOCYTES # BLD AUTO: 581 CELLS/UL (ref 200–950)
MONOCYTES NFR BLD AUTO: 8.3 %
NEUTROPHILS # BLD AUTO: 4424 CELLS/UL (ref 1500–7800)
NEUTROPHILS NFR BLD AUTO: 63.2 %
NONHDLC SERPL-MCNC: 137 MG/DL (CALC)
PLATELET # BLD AUTO: 230 THOUSAND/UL (ref 140–400)
PMV BLD REES-ECKER: 9.8 FL (ref 7.5–12.5)
POTASSIUM SERPL-SCNC: 4.3 MMOL/L (ref 3.5–5.3)
PROT SERPL-MCNC: 7.5 G/DL (ref 6.1–8.1)
RBC # BLD AUTO: 4.84 MILLION/UL (ref 3.8–5.1)
SL AMB CA27.29 (CHIRON): 21 U/ML
SL AMB EGFR AFRICAN AMERICAN: 69 ML/MIN/1.73M2
SL AMB EGFR NON AFRICAN AMERICAN: 59 ML/MIN/1.73M2
SODIUM SERPL-SCNC: 139 MMOL/L (ref 135–146)
TRIGL SERPL-MCNC: 120 MG/DL
WBC # BLD AUTO: 7 THOUSAND/UL (ref 3.8–10.8)

## 2018-06-04 ENCOUNTER — OFFICE VISIT (OUTPATIENT)
Dept: FAMILY MEDICINE CLINIC | Facility: CLINIC | Age: 58
End: 2018-06-04
Payer: COMMERCIAL

## 2018-06-04 ENCOUNTER — OFFICE VISIT (OUTPATIENT)
Dept: HEMATOLOGY ONCOLOGY | Facility: MEDICAL CENTER | Age: 58
End: 2018-06-04
Payer: COMMERCIAL

## 2018-06-04 VITALS
WEIGHT: 118 LBS | HEIGHT: 63 IN | SYSTOLIC BLOOD PRESSURE: 126 MMHG | RESPIRATION RATE: 16 BRPM | TEMPERATURE: 98.6 F | DIASTOLIC BLOOD PRESSURE: 72 MMHG | HEART RATE: 72 BPM | BODY MASS INDEX: 20.91 KG/M2

## 2018-06-04 VITALS
BODY MASS INDEX: 20.91 KG/M2 | SYSTOLIC BLOOD PRESSURE: 130 MMHG | TEMPERATURE: 98.9 F | HEART RATE: 69 BPM | HEIGHT: 63 IN | DIASTOLIC BLOOD PRESSURE: 64 MMHG | WEIGHT: 118 LBS | OXYGEN SATURATION: 100 % | RESPIRATION RATE: 18 BRPM

## 2018-06-04 DIAGNOSIS — E11.36 TYPE 2 DIABETES MELLITUS WITH DIABETIC CATARACT, WITHOUT LONG-TERM CURRENT USE OF INSULIN (HCC): Primary | ICD-10-CM

## 2018-06-04 DIAGNOSIS — I10 BENIGN ESSENTIAL HYPERTENSION: ICD-10-CM

## 2018-06-04 DIAGNOSIS — E78.2 MIXED HYPERLIPIDEMIA: ICD-10-CM

## 2018-06-04 DIAGNOSIS — Z11.59 NEED FOR HEPATITIS C SCREENING TEST: ICD-10-CM

## 2018-06-04 DIAGNOSIS — C50.919 ADENOCARCINOMA OF BREAST, UNSPECIFIED LATERALITY (HCC): Primary | ICD-10-CM

## 2018-06-04 PROCEDURE — 99214 OFFICE O/P EST MOD 30 MIN: CPT | Performed by: FAMILY MEDICINE

## 2018-06-04 PROCEDURE — 99213 OFFICE O/P EST LOW 20 MIN: CPT | Performed by: INTERNAL MEDICINE

## 2018-06-04 PROCEDURE — 3074F SYST BP LT 130 MM HG: CPT | Performed by: FAMILY MEDICINE

## 2018-06-04 PROCEDURE — 3078F DIAST BP <80 MM HG: CPT | Performed by: FAMILY MEDICINE

## 2018-06-04 RX ORDER — NEBIVOLOL 5 MG/1
5 TABLET ORAL DAILY
Qty: 90 TABLET | Refills: 1 | Status: SHIPPED | OUTPATIENT
Start: 2018-06-04 | End: 2019-03-04 | Stop reason: SDUPTHER

## 2018-06-04 RX ORDER — PRAVASTATIN SODIUM 10 MG
10 TABLET ORAL DAILY
Qty: 90 TABLET | Refills: 1 | Status: SHIPPED | OUTPATIENT
Start: 2018-06-04 | End: 2018-12-15 | Stop reason: SDUPTHER

## 2018-06-04 RX ORDER — AMLODIPINE AND OLMESARTAN MEDOXOMIL 5; 40 MG/1; MG/1
1 TABLET ORAL DAILY
Qty: 90 TABLET | Refills: 1 | Status: SHIPPED | OUTPATIENT
Start: 2018-06-04 | End: 2018-11-22 | Stop reason: SDUPTHER

## 2018-06-04 NOTE — PROGRESS NOTES
Assessment/Plan:    Problem List Items Addressed This Visit     Benign essential hypertension    Relevant Medications    nebivolol (BYSTOLIC) 5 mg tablet    amlodipine-olmesartan (EMANUEL) 5-40 MG    Other Relevant Orders    CBC and differential    Mixed hyperlipidemia    Relevant Medications    pravastatin (PRAVACHOL) 10 mg tablet    Other Relevant Orders    CBC and differential    Need for hepatitis C screening test      Other Visit Diagnoses     Type 2 diabetes mellitus with diabetic cataract, without long-term current use of insulin (HCC)    -  Primary    Relevant Medications    amlodipine-olmesartan (EMANUEL) 5-40 MG    SAXagliptin-MetFORMIN ER (KOMBIGLYZE XR) 5-1000 MG TB24    Other Relevant Orders    HEMOGLOBIN A1C W/ EAG ESTIMATION    Comprehensive metabolic panel    CBC and differential          There are no Patient Instructions on file for this visit  Return in about 4 months (around 10/4/2018)  Subjective:      Patient ID: Antonio Grier is a 65 y o  female  Chief Complaint   Patient presents with    Follow-up     review new labs--jj       Pt is here to follow up her diabetes  Pt is doing well  Pt is concerned about the possibility of needing insulin  Pt denies CP, no SoB no polyuria no polydipsia        The following portions of the patient's history were reviewed and updated as appropriate: allergies, current medications, past family history, past medical history, past social history, past surgical history and problem list     Review of Systems   Constitutional: Negative  Negative for activity change, appetite change, chills, diaphoresis, fatigue and fever  HENT: Negative  Negative for congestion, dental problem, ear discharge, ear pain, postnasal drip, sinus pressure and sore throat  Eyes: Negative  Negative for photophobia, pain, discharge, redness, itching and visual disturbance  Respiratory: Negative for apnea and chest tightness  Cardiovascular: Negative    Negative for chest pain, palpitations and leg swelling  Gastrointestinal: Negative  Negative for abdominal distention, abdominal pain, constipation and diarrhea  Endocrine: Negative  Negative for cold intolerance and heat intolerance  Genitourinary: Negative  Negative for difficulty urinating and dyspareunia  Musculoskeletal: Negative  Negative for arthralgias and back pain  Skin: Negative  Allergic/Immunologic: Negative for environmental allergies  Neurological: Negative  Negative for dizziness  Psychiatric/Behavioral: Negative  Negative for agitation  Current Outpatient Prescriptions   Medication Sig Dispense Refill    aspirin 81 MG tablet Take by mouth daily      fluticasone (FLONASE) 50 mcg/act nasal spray USE 2 SPRAYS IN EACH NOSTRIL DAILY 48 g 1    Glucosamine-Chondroitin 250-200 MG TABS Take by mouth      glucose blood test strip by In Vitro route 5 (five) times a day      letrozole (FEMARA) 2 5 mg tablet Take 1 tablet by mouth daily      Multiple Vitamins-Iron (QC DAILY MULTIVITAMINS/IRON) TABS Take by mouth daily      NAFTIN 2 % GEL APPLY TOPICALLY TWICE DAILY  0    Probiotic Product (PROBIOTIC DAILY) CAPS Take by mouth      amlodipine-olmesartan (EMANUEL) 5-40 MG Take 1 tablet by mouth daily for 90 days 90 tablet 1    nebivolol (BYSTOLIC) 5 mg tablet Take 1 tablet (5 mg total) by mouth daily for 90 days 90 tablet 1    pravastatin (PRAVACHOL) 10 mg tablet Take 1 tablet (10 mg total) by mouth daily for 90 days 90 tablet 1    SAXagliptin-MetFORMIN ER (KOMBIGLYZE XR) 5-1000 MG TB24 Take 1 tablet by mouth daily for 90 days 90 tablet 1     No current facility-administered medications for this visit  Objective:    /72   Pulse 72   Temp 98 6 °F (37 °C)   Resp 16   Ht 5' 2 5" (1 588 m)   Wt 53 5 kg (118 lb)   BMI 21 24 kg/m²        Physical Exam   Constitutional: She appears well-developed and well-nourished  No distress  HENT:   Head: Normocephalic and atraumatic     Right Ear: External ear normal    Left Ear: External ear normal    Nose: Nose normal    Mouth/Throat: Oropharynx is clear and moist  No oropharyngeal exudate  Eyes: EOM are normal  Pupils are equal, round, and reactive to light  Right eye exhibits no discharge  Left eye exhibits no discharge  No scleral icterus  Neck: No thyromegaly present  Cardiovascular: Normal rate and normal heart sounds  No murmur heard  Pulmonary/Chest: Effort normal and breath sounds normal  No respiratory distress  She has no wheezes  Abdominal: Soft  Bowel sounds are normal  She exhibits no distension and no mass  There is no tenderness  There is no rebound and no guarding  Musculoskeletal: Normal range of motion  Neurological: She is alert  She displays normal reflexes  Coordination normal    Skin: Skin is warm and dry  No rash noted  She is not diaphoretic  No erythema  Psychiatric: She has a normal mood and affect  Her behavior is normal    Nursing note and vitals reviewed          Recent Results (from the past 504 hour(s))   Lipid panel    Collection Time: 05/19/18  7:07 AM   Result Value Ref Range    Total Cholesterol 185 <200 mg/dL    SL AMB HDL CHOLESTEROL 48 (L) >50 mg/dL    Triglycerides 120 <150 mg/dL    SL AMB LDL-CHOLESTEROL 114 (H) mg/dL (calc)    SL AMB CHOL/HDLC RATIO 3 9 <5 0 (calc)    SL AMB NON HDL CHOLESTEROL 137 (H) <130 mg/dL (calc)   Comprehensive metabolic panel    Collection Time: 05/19/18  7:07 AM   Result Value Ref Range    SL AMB GLUCOSE 130 (H) 65 - 99 mg/dL    BUN 21 7 - 25 mg/dL    Creatinine, Serum 1 05 0 50 - 1 05 mg/dL    eGFR Non  59 (L) > OR = 60 mL/min/1 73m2    SL AMB EGFR  69 > OR = 60 mL/min/1 73m2    SL AMB BUN/CREATININE RATIO NOT APPLICABLE 6 - 22 (calc)    SL AMB SODIUM 139 135 - 146 mmol/L    SL AMB POTASSIUM 4 3 3 5 - 5 3 mmol/L    SL AMB CHLORIDE 101 98 - 110 mmol/L    SL AMB CARBON DIOXIDE 26 20 - 31 mmol/L    SL AMB CALCIUM 9 3 8 6 - 10 4 mg/dL    SL AMB PROTEIN, TOTAL 7 5 6 1 - 8 1 g/dL    Serum Albumin 4 2 3 6 - 5 1 g/dL    SL AMB GLOBULIN 3 3 1 9 - 3 7 g/dL (calc)    SL AMB ALBUMIN/GLOBULIN RATIO 1 3 1 0 - 2 5 (calc)    SL AMB BILIRUBIN, TOTAL 0 5 0 2 - 1 2 mg/dL    SL AMB ALKALINE PHOSPHATASE 83 33 - 130 U/L    SL AMB AST 13 10 - 35 U/L    SL AMB ALT 11 6 - 29 U/L   Hemoglobin A1c    Collection Time: 05/19/18  7:07 AM   Result Value Ref Range    Hemoglobin A1C 5 8 (H) <5 7 % of total Hgb   Lipase    Collection Time: 05/19/18  7:07 AM   Result Value Ref Range    Lipase, Serum 59 7 - 60 U/L   CBC and differential    Collection Time: 05/19/18  7:09 AM   Result Value Ref Range    SL AMB LAB WHITE BLOOD CELL COUNT 7 0 3 8 - 10 8 Thousand/uL     AMB LAB RED BLOOD CELLS 4 84 3 80 - 5 10 Million/uL    Hemoglobin 13 1 11 7 - 15 5 g/dL    Hematocrit 39 8 35 0 - 45 0 %    MCV 82 2 80 0 - 100 0 fL    MCH 27 1 27 0 - 33 0 pg    MCHC 32 9 32 0 - 36 0 g/dL    RDW 14 4 11 0 - 15 0 %    Platelet Count 159 796 - 400 Thousand/uL    SL AMB MPV 9 8 7 5 - 12 5 fL    Neutrophils (Absolute) 4,424 1,500 - 7,800 cells/uL    Lymphocytes (Absolute) 1,967 850 - 3,900 cells/uL    Monocytes (Absolute) 581 200 - 950 cells/uL    Eosinophils (Absolute) 0 (L) 15 - 500 cells/uL    Basophils (Absolute) 28 0 - 200 cells/uL    Neutrophils 63 2 %    Lymphocytes 28 1 %    Monocytes 8 3 %    Eosinophils 0 0 %    Basophils 0 4 %   Cancer antigen 15-3    Collection Time: 05/19/18  7:09 AM   Result Value Ref Range    CA 15-3 17 <32 U/mL       Collection Time: 05/19/18  7:09 AM   Result Value Ref Range    Cancer Antigen (CA) 125 5 <35 U/mL   Cancer antigen 27 29    Collection Time: 05/19/18  7:09 AM   Result Value Ref Range    CA27 29 (CHIRON) 21 <38 U/mL          Catarina Bloch, DO

## 2018-06-04 NOTE — PROGRESS NOTES
Saida Nelson  1960  [facility] [address] [phone]    DISCUSSIONS/SUMMARY:    27-year-old female with history of recurrent right breast cancer presently on Femara  Mrs Nelson (formally Marco Locke) feels well and clinically there are no troubling signs or any signs of breast cancer recurrence  Recent laboratory tests were good/acceptable; CA 27, 29 and CA-15-3 as well as the  all within normal limits  The most recent mammogram is good/WNL  Patient is to continue with the self breast exams and yearly mammograms (October 2018 is the next date)  Patient is to return in 6 months with repeat blood work before  We re-discussed what to monitor for in regard to disease progression  Previous DEXA scan demonstrated osteopenia  Patient is on calcium and vitamin D as well as working out  Recently we discussed Prolia - patient deferred  Routine health maintenance and medical care is up-to-date  Patient knows to call if she has any other oncology questions or concerns  Carefully review your medication list and verify that the list is accurate and up-to-date  Please call the oncology office if there are medications missing from the list, medications on the list that you are not currently taking or if there is a dosage or instruction that is different from how you're taking a medication  Chief Complaint   Patient presents with    Follow-up     Recurrent breast cancer on Femara     History of Present Illness:    65 female with a complicated past breast cancer history back for follow-up  Patient was first diagnosed in 2003 and underwent a right modified radical mastectomy in December 2003  Pathology results from [facility] demonstrated invasive ductal carcinoma, 0 5 cm with focal lymphovascular invasion   There was a second right breast mass also invasive, 4 0 by 2 5 cm, moderately differentiated  Patient also had focal DCIS, margins were free of tumor  Right axillary contents demonstrated 4/17 positive lymph nodes  Final stage was IIIA (pT2 pN2a M0)  Patient states that she refused adjuvant chemotherapy at that time  It is never been clear why patient was not offered adjuvant hormonal manipulation  Subsequently patient moved to [place] and in 2007 began to have pain in her right shoulder  Workup demonstrated 2 nodules within the shoulder muscle  One nodule was removed and demonstrated metastatic carcinoma  Patient underwent radiotherapy without complications and was placed on tamoxifen - completed 5 years (started in January 2008)  Patient subsequently began Femara and has completed 5+ years of Femara  Patient returns for follow-up and is accompanied by her   Mrs Nelson states feeling okay, baseline  Patient continues to be active  Appetite is good, weight is stable  No specific problems with the Femara  Routine health maintenance and medical care is up-to-date  As discussed previously, patient has an occasional hot flash - no different than before  Routine health maintenance and medical care is up-to-date  Review of Systems   Constitutional: Negative  HENT: Negative  Eyes: Negative  Respiratory: Negative  Cardiovascular: Negative  Gastrointestinal: Negative  Endocrine: Negative  Genitourinary: Negative  Musculoskeletal: Negative  Skin: Negative  Allergic/Immunologic: Negative  Neurological: Negative  Hematological: Negative  Psychiatric/Behavioral: Negative  All other systems reviewed and are negative         Patient Active Problem List   Diagnosis    [facility]en[facility] of breast ([facility]    Seasonal allergic rhinitis due to pollen    Anemia    Anxiety    Benign essential hypertension    Breast cancer [facility]    Diabetic cataract [facility]    Mixed hyperlipidemia    Osteoporosis    Type 2 diabetes mellitus with retinopathy of both eyes, without long-term current use of insulin ([facility] )    Need for hepatitis C screening test     Past Medical History:   Diagnosis Date    Abnormal liver function test     Acute upper respiratory infection 04/17/2008    Breast cancer ([facility] )     Cough 08/07/2008    Diabetes mellitus ([facility] )     History of acute sinusitis 09/28/2010    History of diarrhea     History of hypertension     History of motion sickness 08/07/2008    History of orthopnea 01/10/2008     Past Surgical History:   Procedure Laterality Date    BREAST LUMPECTOMY      MASTECTOMY      TOOTH EXTRACTION       Family History   Problem Relation Age of Onset    Hypertension Father     Diabetes Family     Heart disease Family     Parkinsonism Mother     Dementia Mother     Mental illness Neg Hx      Social History     Social History    Marital status: /Civil Union     Spouse name: N/A    Number of children: N/A    Years of education: N/A     Occupational History    Not on file       Social History Main Topics    Smoking status: Never Smoker    Smokeless tobacco: Never Used    Alcohol use Yes      Comment: social drinker    Drug use: Unknown    Sexual activity: Not on file     Other Topics Concern    Not on file     Social History Narrative    No narrative on file       Current Outpatient Prescriptions:     amlodipine-olmesartan (EMANUEL) 5-40 MG, Take 1 tablet by mouth daily for 90 days, Disp: 90 tablet, Rfl: 1    aspirin 81 MG tablet, Take by mouth daily, Disp: , Rfl:     fluticasone (FLONASE) 50 mcg/act nasal spray, USE 2 SPRAYS IN EACH NOSTRIL DAILY, Disp: 48 g, Rfl: 1    Glucosamine-Chondroitin 250-200 MG TABS, Take by mouth, Disp: , Rfl:     glucose blood test strip, by In Vitro route 5 (five) times a day, Disp: , Rfl:     letrozole (FEMARA) 2 5 mg tablet, Take 1 tablet by mouth daily, Disp: , Rfl:     Multiple Vitamins-Iron (QC DAILY MULTIVITAMINS/IRON) TABS, Take by mouth daily, Disp: , Rfl:     NAFTIN 2 % GEL, APPLY TOPICALLY TWICE DAILY, Disp: , Rfl: 0    nebivolol (BYSTOLIC) 5 mg tablet, Take 1 tablet (5 mg total) by mouth daily for 90 days, Disp: 90 tablet, Rfl: 1    pravastatin (PRAVACHOL) 10 mg tablet, Take 1 tablet (10 mg total) by mouth daily for 90 days, Disp: 90 tablet, Rfl: 1    Probiotic Product (PROBIOTIC DAILY) CAPS, Take by mouth, Disp: , Rfl:     SAXagliptin-MetFORMIN ER (KOMBIGLYZE XR) 5-1000 MG TB24, Take 1 tablet by mouth daily for 90 days, Disp: 90 tablet, Rfl: 1     Allergies   Allergen Reactions    Erythromycin Swelling     Reaction Date: 23Aug2007;     Penicillins Hives     Reaction Date: 23Aug2007;     Codeine Nausea Only     Reaction Date: 23Aug2007;     Sulfate Rash and Fever     Reaction Date: 23Aug2007;      Vitals:    06/04/18 1458   BP: 130/64   Pulse: 69   Resp: 18   Temp: 98 9 °F (37 2 °C)   SpO2: 100%     Physical Exam   Constitutional: She is oriented to person, place, and time  She appears well-developed and well-nourished  HENT:   Head: Normocephalic and atraumatic  Right Ear: External ear normal    Left Ear: External ear normal    Nose: Nose normal    Mouth/Throat: Oropharynx is clear and moist    Eyes: Conjunctivae and EOM are normal  Pupils are equal, round, and reactive to light  Neck: Normal range of motion  Neck supple  Cardiovascular: Normal rate, regular rhythm, normal heart sounds and intact distal pulses  Pulmonary/Chest: Effort normal and breath sounds normal    Abdominal: Soft  Bowel sounds are normal    Musculoskeletal: Normal range of motion  Neurological: She is alert and oriented to person, place, and time  She has normal reflexes  Skin: Skin is warm  Psychiatric: She has a normal mood and affect   Her behavior is normal  Judgment and thought content normal    Breasts: deferred, no axillary adenopathy BL  Extremities: no LE edema, no cords, pulse are 1+  Lymphatics:     Performance Status: ECOG = 0    Labs:    5/19/18 Ca 27,29 = 21 Ca 15-3 = 17 Ca-125 = 5 LFTs WNL  H/H = 13 1/39 8    1/20/18 WBC = 6 6 hemoglobin = 12 9 hematocrit = 39 7 platelet = 950 neutrophil = 75% BUN = 21          creatinine = 1 00 calcium = 9 6 LFTs WNL CA 27, 29 = 24     CA 15-3 = 14      = 5  8/26/17 BUN = 24 creatinine = 1 07 LFTs WNL calcium = 9 8 WBC = 7 1 hemoglobin = 13 hematocrit = 39 8 platelet = 893  = 6  5/19/17 CA 15-3 = 17 CA 27, 29 = 35  = 5   2/11/17 WBC = 7 2 hemoglobin = 12 7 hematocrit = 39 platelet = 606 BUN = 19 creatinine = 1 06 calcium = 9 6 LFTs WNL CA-125 = 7 CA 15-3 = 28 CA 27, 29 = 59  10/1/16  CA 15-3 = 15 CA 27, 29 = 40  = 7  5/21/16 6 CA 15-3 = 23 CA-125 = 7 CA 27, 29 = 41  2/20/16  Ca-125 = 6 CA 27, 29 = 47 LFTs WNL calcium = 9 7    Imaging        10/13/17 diagnostic left mammogram = category 1, negative  10/10/16 left unilateral digital mammogram with CAD was read as category 1, negative  3/3/17 DEXA scan demonstrated osteopenia  8/7/15 left unilateral digital mammogram was category 1, negative      3/12/15 DEXA scan demonstrated WHO classification osteopenia  8/1/14 left digital diagnostic mammogram was category 1, negative  Pathology    6/25/15 BRCA1/2 sequencing and deletion/duplication analysis = negative

## 2018-06-04 NOTE — ASSESSMENT & PLAN NOTE
Pt was just seen by optho, states she was told still considered retinopathy even though her exam was acceptable

## 2018-06-08 DIAGNOSIS — E11.3293 TYPE 2 DIABETES MELLITUS WITH BOTH EYES AFFECTED BY MILD NONPROLIFERATIVE RETINOPATHY WITHOUT MACULAR EDEMA, WITHOUT LONG-TERM CURRENT USE OF INSULIN (HCC): Primary | ICD-10-CM

## 2018-06-08 RX ORDER — BLOOD-GLUCOSE METER
KIT MISCELLANEOUS
Qty: 300 EACH | Refills: 3 | Status: SHIPPED | OUTPATIENT
Start: 2018-06-08 | End: 2019-02-04 | Stop reason: SDUPTHER

## 2018-08-02 DIAGNOSIS — C50.919 MALIGNANT NEOPLASM OF BREAST IN FEMALE, ESTROGEN RECEPTOR POSITIVE, UNSPECIFIED LATERALITY, UNSPECIFIED SITE OF BREAST (HCC): Primary | ICD-10-CM

## 2018-08-02 DIAGNOSIS — Z17.0 MALIGNANT NEOPLASM OF BREAST IN FEMALE, ESTROGEN RECEPTOR POSITIVE, UNSPECIFIED LATERALITY, UNSPECIFIED SITE OF BREAST (HCC): Primary | ICD-10-CM

## 2018-08-02 RX ORDER — LETROZOLE 2.5 MG/1
2.5 TABLET, FILM COATED ORAL DAILY
Qty: 90 TABLET | Refills: 1 | Status: SHIPPED | OUTPATIENT
Start: 2018-08-02 | End: 2019-01-15 | Stop reason: SDUPTHER

## 2018-10-22 LAB
ALBUMIN SERPL-MCNC: 4.2 G/DL (ref 3.6–5.1)
ALBUMIN/GLOB SERPL: 1.5 (CALC) (ref 1–2.5)
ALP SERPL-CCNC: 77 U/L (ref 33–130)
ALT SERPL-CCNC: 11 U/L (ref 6–29)
AST SERPL-CCNC: 14 U/L (ref 10–35)
BASOPHILS # BLD AUTO: 13 CELLS/UL (ref 0–200)
BASOPHILS NFR BLD AUTO: 0.2 %
BILIRUB SERPL-MCNC: 0.4 MG/DL (ref 0.2–1.2)
BUN SERPL-MCNC: 22 MG/DL (ref 7–25)
BUN/CREAT SERPL: 20 (CALC) (ref 6–22)
CALCIUM SERPL-MCNC: 9.6 MG/DL (ref 8.6–10.4)
CHLORIDE SERPL-SCNC: 100 MMOL/L (ref 98–110)
CO2 SERPL-SCNC: 29 MMOL/L (ref 20–32)
CREAT SERPL-MCNC: 1.08 MG/DL (ref 0.5–1.05)
EOSINOPHIL # BLD AUTO: 0 CELLS/UL (ref 15–500)
EOSINOPHIL NFR BLD AUTO: 0 %
ERYTHROCYTE [DISTWIDTH] IN BLOOD BY AUTOMATED COUNT: 14.2 % (ref 11–15)
EST. AVERAGE GLUCOSE BLD GHB EST-MCNC: 123 (CALC)
EST. AVERAGE GLUCOSE BLD GHB EST-SCNC: 6.8 (CALC)
GLOBULIN SER CALC-MCNC: 2.8 G/DL (CALC) (ref 1.9–3.7)
GLUCOSE SERPL-MCNC: 121 MG/DL (ref 65–99)
HBA1C MFR BLD: 5.9 % OF TOTAL HGB
HCT VFR BLD AUTO: 36.7 % (ref 35–45)
HCV AB S/CO SERPL IA: 0.02
HCV AB SERPL QL IA: NORMAL
HGB BLD-MCNC: 12.3 G/DL (ref 11.7–15.5)
LYMPHOCYTES # BLD AUTO: 1551 CELLS/UL (ref 850–3900)
LYMPHOCYTES NFR BLD AUTO: 23.5 %
MCH RBC QN AUTO: 27.2 PG (ref 27–33)
MCHC RBC AUTO-ENTMCNC: 33.5 G/DL (ref 32–36)
MCV RBC AUTO: 81.2 FL (ref 80–100)
MONOCYTES # BLD AUTO: 568 CELLS/UL (ref 200–950)
MONOCYTES NFR BLD AUTO: 8.6 %
NEUTROPHILS # BLD AUTO: 4468 CELLS/UL (ref 1500–7800)
NEUTROPHILS NFR BLD AUTO: 67.7 %
PLATELET # BLD AUTO: 220 THOUSAND/UL (ref 140–400)
PMV BLD REES-ECKER: 9.4 FL (ref 7.5–12.5)
POTASSIUM SERPL-SCNC: 4.8 MMOL/L (ref 3.5–5.3)
PROT SERPL-MCNC: 7 G/DL (ref 6.1–8.1)
RBC # BLD AUTO: 4.52 MILLION/UL (ref 3.8–5.1)
SL AMB EGFR AFRICAN AMERICAN: 66 ML/MIN/1.73M2
SL AMB EGFR NON AFRICAN AMERICAN: 57 ML/MIN/1.73M2
SODIUM SERPL-SCNC: 136 MMOL/L (ref 135–146)
WBC # BLD AUTO: 6.6 THOUSAND/UL (ref 3.8–10.8)

## 2018-10-24 LAB
CANCER AG125 SERPL-ACNC: 6 U/ML
CANCER AG15-3 SERPL-ACNC: 16 U/ML
SL AMB CA27.29 (CHIRON): 24 U/ML

## 2018-10-28 DIAGNOSIS — J30.1 SEASONAL ALLERGIC RHINITIS DUE TO POLLEN: ICD-10-CM

## 2018-10-29 ENCOUNTER — OFFICE VISIT (OUTPATIENT)
Dept: HEMATOLOGY ONCOLOGY | Facility: MEDICAL CENTER | Age: 58
End: 2018-10-29
Payer: COMMERCIAL

## 2018-10-29 ENCOUNTER — OFFICE VISIT (OUTPATIENT)
Dept: FAMILY MEDICINE CLINIC | Facility: CLINIC | Age: 58
End: 2018-10-29
Payer: COMMERCIAL

## 2018-10-29 VITALS
HEART RATE: 80 BPM | TEMPERATURE: 98.2 F | WEIGHT: 120.4 LBS | DIASTOLIC BLOOD PRESSURE: 70 MMHG | RESPIRATION RATE: 16 BRPM | BODY MASS INDEX: 21.33 KG/M2 | HEIGHT: 63 IN | SYSTOLIC BLOOD PRESSURE: 136 MMHG

## 2018-10-29 VITALS
WEIGHT: 120 LBS | TEMPERATURE: 98.2 F | BODY MASS INDEX: 21.26 KG/M2 | HEART RATE: 79 BPM | SYSTOLIC BLOOD PRESSURE: 122 MMHG | OXYGEN SATURATION: 99 % | DIASTOLIC BLOOD PRESSURE: 70 MMHG | RESPIRATION RATE: 18 BRPM | HEIGHT: 63 IN

## 2018-10-29 DIAGNOSIS — I10 BENIGN ESSENTIAL HYPERTENSION: ICD-10-CM

## 2018-10-29 DIAGNOSIS — C50.919 ADENOCARCINOMA OF BREAST, UNSPECIFIED LATERALITY (HCC): ICD-10-CM

## 2018-10-29 DIAGNOSIS — C50.911 ADENOCARCINOMA OF RIGHT BREAST (HCC): Primary | ICD-10-CM

## 2018-10-29 DIAGNOSIS — E78.2 MIXED HYPERLIPIDEMIA: ICD-10-CM

## 2018-10-29 DIAGNOSIS — E11.36 DIABETIC CATARACT (HCC): ICD-10-CM

## 2018-10-29 DIAGNOSIS — E11.3293 TYPE 2 DIABETES MELLITUS WITH BOTH EYES AFFECTED BY MILD NONPROLIFERATIVE RETINOPATHY WITHOUT MACULAR EDEMA, WITHOUT LONG-TERM CURRENT USE OF INSULIN (HCC): Primary | ICD-10-CM

## 2018-10-29 PROBLEM — Z11.59 NEED FOR HEPATITIS C SCREENING TEST: Status: RESOLVED | Noted: 2018-04-20 | Resolved: 2018-10-29

## 2018-10-29 PROCEDURE — 3078F DIAST BP <80 MM HG: CPT | Performed by: FAMILY MEDICINE

## 2018-10-29 PROCEDURE — 99213 OFFICE O/P EST LOW 20 MIN: CPT | Performed by: INTERNAL MEDICINE

## 2018-10-29 PROCEDURE — 1036F TOBACCO NON-USER: CPT | Performed by: FAMILY MEDICINE

## 2018-10-29 PROCEDURE — 3075F SYST BP GE 130 - 139MM HG: CPT | Performed by: FAMILY MEDICINE

## 2018-10-29 PROCEDURE — 99214 OFFICE O/P EST MOD 30 MIN: CPT | Performed by: FAMILY MEDICINE

## 2018-10-29 RX ORDER — FLUOROURACIL 10 MG/G
1 CREAM TOPICAL 2 TIMES DAILY
Refills: 0 | COMMUNITY
Start: 2018-08-15 | End: 2018-10-29 | Stop reason: ALTCHOICE

## 2018-10-29 RX ORDER — FLUTICASONE PROPIONATE 50 MCG
SPRAY, SUSPENSION (ML) NASAL
Qty: 48 G | Refills: 1 | Status: SHIPPED | OUTPATIENT
Start: 2018-10-29 | End: 2019-04-27 | Stop reason: SDUPTHER

## 2018-10-29 NOTE — PROGRESS NOTES
Assessment/Plan:    Problem List Items Addressed This Visit     Adenocarcinoma of breast ([facility] [address] )    Relevant Orders    CBC and differential    Benign essential hypertension    Relevant Orders    CBC and differential    Diabetic cataract ([facility] )    Relevant Orders    CBC and differential    Mixed hyperlipidemia    Relevant Orders    CBC and differential    Comprehensive metabolic panel    Lipid Panel with Direct LDL reflex    Type 2 diabetes mellitus with both eyes affected by mild nonproliferative retinopathy without macular edema, without long-term current use of insulin (HCC) - Primary    Relevant Orders    CBC and differential    Hemoglobin A1C    Microalbumin / creatinine urine ratio          There are no Patient Instructions on file for this visit  Return in about 4 months (around 2/28/2019) for Recheck  Subjective:      Patient ID: Titus Setting is a 63 y o  female  Chief Complaint   Patient presents with    Follow-up     pt  present for 4 month follow up blood work done on 10/20/18       Pt is here for a 4 month follow up  She had her labs  No chest pain no sob  No polyiria no polydipsia    Does not want flu shot        The following portions of the patient's history were reviewed and updated as appropriate: allergies, current medications, past family history, past medical history, past social history, past surgical history and problem list     Review of Systems   Constitutional: Negative  Negative for activity change, appetite change, chills, diaphoresis and fatigue  HENT: Negative  Negative for dental problem, ear pain, sinus pressure and sore throat  Eyes: Negative  Negative for photophobia, pain, discharge, redness, itching and visual disturbance  Respiratory: Negative for apnea and chest tightness  Cardiovascular: Negative  Negative for chest pain, palpitations and leg swelling  Gastrointestinal: Negative  Negative for abdominal distention, abdominal pain, constipation and diarrhea  Endocrine: Negative  Negative for cold intolerance and heat intolerance  Genitourinary: Negative  Negative for difficulty urinating and dyspareunia  Musculoskeletal: Negative  Negative for arthralgias and back pain  Skin: Negative  Allergic/Immunologic: Negative for environmental allergies  Neurological: Negative  Negative for dizziness  Psychiatric/Behavioral: Negative  Negative for agitation  Current Outpatient Prescriptions   Medication Sig Dispense Refill    amlodipine-olmesartan (EMANUEL) 5-40 MG Take 1 tablet by mouth daily for 90 days 90 tablet 1    aspirin 81 MG tablet Take by mouth daily      fluticasone (FLONASE) 50 mcg/act nasal spray USE 2 SPRAYS IN EACH NOSTRIL DAILY 48 g 1    GLUCOCARD VITAL TEST test strip TEST 5 TIMES DAILY 300 each 3    Glucosamine-Chondroitin 250-200 MG TABS Take by mouth      letrozole (FEMARA) 2 5 mg tablet Take 1 tablet (2 5 mg total) by mouth daily 90 tablet 1    Multiple Vitamins-Iron (QC DAILY MULTIVITAMINS/IRON) TABS Take by mouth daily      NAFTIN 2 % GEL APPLY TOPICALLY TWICE DAILY PRN  0    nebivolol (BYSTOLIC) 5 mg tablet Take 1 tablet (5 mg total) by mouth daily for 90 days 90 tablet 1    pravastatin (PRAVACHOL) 10 mg tablet Take 1 tablet (10 mg total) by mouth daily for 90 days 90 tablet 1    Probiotic Product (PROBIOTIC DAILY) CAPS Take by mouth      SAXagliptin-MetFORMIN ER (KOMBIGLYZE XR) 5-1000 MG TB24 Take 1 tablet by mouth daily for 90 days 90 tablet 1     No current facility-administered medications for this visit  Objective:    /70   Pulse 80   Temp 98 2 °F (36 8 °C)   Resp 16   Ht 5' 2 5" (1 588 m)   Wt 54 6 kg (120 lb 6 4 oz)   BMI 21 67 kg/m²        Physical Exam   Constitutional: She appears well-developed and well-nourished  No distress  HENT:   Head: Normocephalic and atraumatic     Right Ear: External ear normal    Left Ear: External ear normal    Nose: Nose normal    Mouth/Throat: Oropharynx is clear and moist  No oropharyngeal exudate  Eyes: Pupils are equal, round, and reactive to light  EOM are normal  Right eye exhibits no discharge  Left eye exhibits no discharge  No scleral icterus  Neck: No thyromegaly present  Cardiovascular: Normal rate and normal heart sounds  No murmur heard  Pulmonary/Chest: Effort normal and breath sounds normal  No respiratory distress  She has no wheezes  Abdominal: Soft  Bowel sounds are normal  She exhibits no distension and no mass  There is no tenderness  There is no rebound and no guarding  Musculoskeletal: Normal range of motion  Neurological: She is alert  She displays normal reflexes  Coordination normal    Skin: Skin is warm and dry  No rash noted  She is not diaphoretic  No erythema  Psychiatric: She has a normal mood and affect  Her behavior is normal    Nursing note and vitals reviewed             Recent Results (from the past 504 hour(s))   Comprehensive metabolic panel    Collection Time: 10/20/18  7:40 AM   Result Value Ref Range    Glucose 121 (H) 65 - 99 mg/dL    BUN 22 7 - 25 mg/dL    Creatinine 1 08 (H) 0 50 - 1 05 mg/dL    eGFR Non  57 (L) > OR = 60 mL/min/1 73m2    SL AMB EGFR  66 > OR = 60 mL/min/1 73m2    SL AMB BUN/CREATININE RATIO 20 6 - 22 (calc)    Sodium 136 135 - 146 mmol/L    Potassium 4 8 3 5 - 5 3 mmol/L    Chloride 100 98 - 110 mmol/L    CO2 29 20 - 32 mmol/L    SL AMB CALCIUM 9 6 8 6 - 10 4 mg/dL    SL AMB PROTEIN, TOTAL 7 0 6 1 - 8 1 g/dL    Albumin 4 2 3 6 - 5 1 g/dL    Globulin 2 8 1 9 - 3 7 g/dL (calc)    SL AMB ALBUMIN/GLOBULIN RATIO 1 5 1 0 - 2 5 (calc)    TOTAL BILIRUBIN 0 4 0 2 - 1 2 mg/dL    Alkaline Phosphatase 77 33 - 130 U/L    SL AMB AST 14 10 - 35 U/L    SL AMB ALT 11 6 - 29 U/L   CBC and differential    Collection Time: 10/20/18  7:40 AM   Result Value Ref Range    White Blood Cell Count 6 6 3 8 - 10 8 Thousand/uL    Red Blood Cell Count 4 52 3 80 - 5 10 Million/uL    Hemoglobin 12 3 11 7 - 15 5 g/dL    HCT 36 7 35 0 - 45 0 %    MCV 81 2 80 0 - 100 0 fL    MCH 27 2 27 0 - 33 0 pg    MCHC 33 5 32 0 - 36 0 g/dL    RDW 14 2 11 0 - 15 0 %    Platelet Count 334 013 - 400 Thousand/uL    SL AMB MPV 9 4 7 5 - 12 5 fL    Neutrophils (Absolute) 4,468 1,500 - 7,800 cells/uL    Lymphocytes (Absolute) 1,551 850 - 3,900 cells/uL    Monocytes (Absolute) 568 200 - 950 cells/uL    Eosinophils (Absolute) 0 (L) 15 - 500 cells/uL    Basophils (Absolute) 13 0 - 200 cells/uL    Neutrophils 67 7 %    Lymphocytes 23 5 %    Monocytes 8 6 %    Eosinophils 0 0 %    Basophils Relative 0 2 %   Hepatitis C Ab W/Refl To HCV RNA, Qn, PCR    Collection Time: 10/20/18  7:40 AM   Result Value Ref Range    HEP C AB NON-REACTIVE NON-REACTIVE    Signal to Cut-Off 0 02 <1 00   Hemoglobin A1C With EAG    Collection Time: 10/20/18  7:40 AM   Result Value Ref Range    Hemoglobin A1C 5 9 (H) <5 7 % of total Hgb    Estimated Average Glucose 123 (calc)    Estimated Average Glucose (mmol/L) 6 8 (calc)   Cancer antigen 15-3    Collection Time: 10/20/18  7:46 AM   Result Value Ref Range    CA 15-3 16 <32 U/mL       Collection Time: 10/20/18  7:46 AM   Result Value Ref Range    Cancer Antigen (CA) 125 6 <35 U/mL   Cancer antigen 27 29    Collection Time: 10/20/18  7:46 AM   Result Value Ref Range    CA27 29 (CHIRON) 24 <38 U/mL         Anjel Randolph DO

## 2018-10-29 NOTE — PROGRESS NOTES
Saida Nelson  1960  [facility] [address] [id number]    DISCUSSIONS/SUMMARY:    52-year-old female with history of recurrent right breast cancer presently on Femara  Mrs Nelsno (formally Jesus Seip) feels well and clinically there are no troubling signs or any signs of breast cancer recurrence  Recent laboratory tests were good/acceptable; CA 27, 29 and CA-15-3 as well as the  all within normal limits  The most recent mammogram is good/WNL  Patient is to continue with the self breast exams and yearly mammograms (today's mammogram results are still pending)  Patient is to return in 4 months with repeat blood work before (patient's wish for a four-month follow-up with labs before)  Breast exam not needed on next office visit  We re-discussed what to monitor for in regard to disease progression  Previous DEXA scan demonstrated osteopenia  Patient is on calcium and vitamin D as well as working out at [employer]  Recently we discussed Prolia - patient deferred  Routine health maintenance and medical care is up-to-date  Patient knows to call if she has any other oncology questions or concerns  Carefully review your medication list and verify that the list is accurate and up-to-date  Please call the oncology office if there are medications missing from the list, medications on the list that you are not currently taking or if there is a dosage or instruction that is different from how you're taking a medication  Chief Complaint   Patient presents with    Follow-up     Recurrent right-sided breast cancer     History of Present Illness:    63 female with a complicated past breast cancer history back for follow-up  Patient was first diagnosed in 2003 and underwent a right modified radical mastectomy in December 2003   Pathology results from [facility] in [address] [place] [address] demonstrated invasive ductal carcinoma, 0 5 cm with focal lymphovascular invasion  There was a second right breast mass also invasive, 4 0 by 2 5 cm, moderately differentiated  Patient also had focal DCIS, margins were free of tumor  Right axillary contents demonstrated 4/17 positive lymph nodes  Final stage was IIIA (pT2 pN2a M0)  Patient states that she refused adjuvant chemotherapy at that time  It is never been clear why patient was not offered adjuvant hormonal manipulation  Subsequently patient moved to [place] and in 2007 began to have pain in her right shoulder  Workup demonstrated 2 nodules within the shoulder muscle  One nodule was removed and demonstrated metastatic carcinoma  Patient underwent radiotherapy without complications and was placed on tamoxifen - completed 5 years (started in January 2008)  Patient subsequently began Femara and has completed 5+ years of Femara  Patient returns for follow-up and is accompanied by her   Mrs Nelson states feeling okay, baseline  Patient continues to be active  Appetite is good, weight is stable  No specific problems with the Femara  Routine health maintenance and medical care is up-to-date  As discussed previously, patient has an occasional hot flash - no different than before  Review of Systems   Constitutional: Negative  HENT: Negative  Eyes: Negative  Respiratory: Negative  Cardiovascular: Negative  Gastrointestinal: Negative  Endocrine: Negative  Genitourinary: Negative  Musculoskeletal: Negative  Skin: Negative  Allergic/Immunologic: Negative  Neurological: Negative  Hematological: Negative  Psychiatric/Behavioral: Negative  All other systems reviewed and are negative       Patient Active Problem List   Diagnosis    Aden[facility] breast [facility] )    Seasonal allergic rhinitis due to pollen    Anemia    Anxiety    Benign essential hypertension    [facility]    Diabetic cataract [facility]    Mixed hyperlipidemia    Osteoporosis    Type 2 diabetes mellitus with both eyes affected by mild nonproliferative retinopathy without macular edema, without long-term current use of insulin (HCC)     Past Medical History:   Diagnosis Date    Abnormal liver function test     Acute upper respiratory infection 04/17/2008    [facility] ([facility] )     Cough 08/07/2008    Diabetes mellitus ([facility] )     History of acute sinusitis 09/28/2010    History of diarrhea     History of hypertension     History of motion sickness 08/07/2008    History of orthopnea 01/10/2008     Past Surgical History:   Procedure Laterality Date    BREAST LUMPECTOMY      MASTECTOMY      TOOTH EXTRACTION       Family History   Problem Relation Age of Onset    Hypertension Father     Diabetes Family     Heart disease Family     Parkinsonism Mother     Dementia Mother     Mental illness Neg Hx      Social History     Social History    Marital status: /Civil Union     Spouse name: N/A    Number of children: N/A    Years of education: N/A     Occupational History    Not on file       Social History Main Topics    Smoking status: Never Smoker    Smokeless tobacco: Never Used    Alcohol use Yes      Comment: social drinker    Drug use: Unknown    Sexual activity: Not on file     Other Topics Concern    Not on file     Social History Narrative    No narrative on file       Current Outpatient Prescriptions:     amlodipine-olmesartan (EMANUEL) 5-40 MG, Take 1 tablet by mouth daily for 90 days, Disp: 90 tablet, Rfl: 1    aspirin 81 MG tablet, Take by mouth daily, Disp: , Rfl:     fluticasone (FLONASE) 50 mcg/act nasal spray, USE 2 SPRAYS IN EACH NOSTRIL DAILY, Disp: 48 g, Rfl: 1    GLUCOCARD VITAL TEST test strip, TEST 5 TIMES DAILY, Disp: 300 each, Rfl: 3    Glucosamine-Chondroitin 250-200 MG TABS, Take by mouth, Disp: , Rfl:     letrozole (FEMARA) 2 5 mg tablet, Take 1 tablet (2 5 mg total) by mouth daily, Disp: 90 tablet, Rfl: 1    Multiple Vitamins-Iron (QC DAILY MULTIVITAMINS/IRON) TABS, Take by mouth daily, Disp: , Rfl:     NAFTIN 2 % GEL, APPLY TOPICALLY TWICE DAILY PRN, Disp: , Rfl: 0    nebivolol (BYSTOLIC) 5 mg tablet, Take 1 tablet (5 mg total) by mouth daily for 90 days, Disp: 90 tablet, Rfl: 1    pravastatin (PRAVACHOL) 10 mg tablet, Take 1 tablet (10 mg total) by mouth daily for 90 days, Disp: 90 tablet, Rfl: 1    Probiotic Product (PROBIOTIC DAILY) CAPS, Take by mouth, Disp: , Rfl:     SAXagliptin-MetFORMIN ER (KOMBIGLYZE XR) 5-1000 MG TB24, Take 1 tablet by mouth daily for 90 days, Disp: 90 tablet, Rfl: 1     Allergies   Allergen Reactions    Erythromycin Swelling     Reaction Date: 23Aug2007;     Penicillins Hives     Reaction Date: 23Aug2007;     Codeine Nausea Only     Reaction Date: 23Aug2007;     Sulfate Rash and Fever     Reaction Date: 23Aug2007;      Vitals:    10/29/18 1452   BP: 122/70   Pulse: 79   Resp: 18   Temp: 98 2 °F (36 8 °C)   SpO2: 99%     Physical Exam   Constitutional: She is oriented to person, place, and time  She appears well-developed and well-nourished  HENT:   Head: Normocephalic and atraumatic  Right Ear: External ear normal    Left Ear: External ear normal    Nose: Nose normal    Mouth/Throat: Oropharynx is clear and moist    Eyes: Pupils are equal, round, and reactive to light  Conjunctivae and EOM are normal    Neck: Normal range of motion  Neck supple  Cardiovascular: Normal rate, regular rhythm, normal heart sounds and intact distal pulses  Pulmonary/Chest: Effort normal and breath sounds normal    Abdominal: Soft  Bowel sounds are normal    Musculoskeletal: Normal range of motion  Neurological: She is alert and oriented to person, place, and time  She has normal reflexes  Skin: Skin is warm  Psychiatric: She has a normal mood and affect   Her behavior is normal  Judgment and thought content normal    Breasts (female present) right anterior chest wall with well-healed suture line, radiation changes and tattoos still visible, no masses, redness or inflammation, no nodules, left breast without retraction, redness, dimpling or masses, no axillary adenopathy bilaterally  Extremities: no LE edema, no cords, pulse are 1+  Lymphatics:   No adenopathy in the neck, supraclavicular region, axilla and groin bilaterally    Performance Status: ECOG = 0    Labs:    10/20/2018 WBC = 6 6 hemoglobin = 12 3 hematocrit = 36 7 platelet = 894 neutrophil = 68% BUN = 22 creatinine = 1 08 LFTs WNL CA 15-3 = 16  = 6 CA 27, 29 = 24    5/19/18 Ca 27,29 = 21 Ca 15-3 = 17 Ca-125 = 5 LFTs WNL  H/H = 13 1/39 8  1/20/18 WBC = 6 6 hemoglobin = 12 9 hematocrit = 39 7 platelet = 471 neutrophil = 75% BUN = 21          creatinine = 1 00 calcium = 9 6 LFTs WNL CA 27, 29 = 24     CA 15-3 = 14      = 5  8/26/17 BUN = 24 creatinine = 1 07 LFTs WNL calcium = 9 8 WBC = 7 1 hemoglobin = 13 hematocrit = 39 8 platelet = 875  = 6  5/19/17 CA 15-3 = 17 CA 27, 29 = 35  = 5   2/11/17 WBC = 7 2 hemoglobin = 12 7 hematocrit = 39 platelet = 059 BUN = 19 creatinine = 1 06 calcium = 9 6 LFTs WNL CA-125 = 7 CA 15-3 = 28 CA 27, 29 = 59  10/1/16  CA 15-3 = 15 CA 27, 29 = 40  = 7  5/21/16 6 CA 15-3 = 23 CA-125 = 7 CA 27, 29 = 41  2/20/16  Ca-125 = 6 CA 27, 29 = 47 LFTs WNL calcium = 9 7    Imaging    10/29/2018 mammogram performed today - results still pending    10/13/17 diagnostic left mammogram = category 1, negative  10/10/16 left unilateral digital mammogram with CAD was read as category 1, negative  3/3/17 DEXA scan demonstrated osteopenia  8/7/15 left unilateral digital mammogram was category 1, negative  3/12/15 DEXA scan demonstrated WHO classification osteopenia  8/1/14 left digital diagnostic mammogram was category 1, negative  Pathology    6/25/15 BRCA1/2 sequencing and deletion/duplication analysis = negative

## 2018-11-22 DIAGNOSIS — I10 BENIGN ESSENTIAL HYPERTENSION: ICD-10-CM

## 2018-11-22 DIAGNOSIS — E11.36 TYPE 2 DIABETES MELLITUS WITH DIABETIC CATARACT, WITHOUT LONG-TERM CURRENT USE OF INSULIN (HCC): ICD-10-CM

## 2018-11-23 RX ORDER — AMLODIPINE AND OLMESARTAN MEDOXOMIL 5; 40 MG/1; MG/1
TABLET ORAL
Qty: 90 TABLET | Refills: 1 | Status: SHIPPED | OUTPATIENT
Start: 2018-11-23 | End: 2019-03-04 | Stop reason: SDUPTHER

## 2018-12-15 DIAGNOSIS — E11.36 TYPE 2 DIABETES MELLITUS WITH DIABETIC CATARACT, WITHOUT LONG-TERM CURRENT USE OF INSULIN (HCC): ICD-10-CM

## 2018-12-15 DIAGNOSIS — E78.2 MIXED HYPERLIPIDEMIA: ICD-10-CM

## 2018-12-18 RX ORDER — SAXAGLIPTIN AND METFORMIN HYDROCHLORIDE 5; 1000 MG/1; MG/1
TABLET, FILM COATED, EXTENDED RELEASE ORAL
Qty: 90 TABLET | Refills: 1 | Status: SHIPPED | OUTPATIENT
Start: 2018-12-18 | End: 2019-03-04 | Stop reason: SDUPTHER

## 2018-12-18 RX ORDER — PRAVASTATIN SODIUM 10 MG
TABLET ORAL
Qty: 90 TABLET | Refills: 1 | Status: SHIPPED | OUTPATIENT
Start: 2018-12-18 | End: 2019-03-04 | Stop reason: SDUPTHER

## 2019-01-03 ENCOUNTER — TELEPHONE (OUTPATIENT)
Dept: FAMILY MEDICINE CLINIC | Facility: CLINIC | Age: 59
End: 2019-01-03

## 2019-01-03 NOTE — TELEPHONE ENCOUNTER
[facility]    Patient needs WRITTEN SCRIPTS for 5 bras and 1 mastatic         Please fax scripts to [phone]

## 2019-01-14 ENCOUNTER — TRANSCRIBE ORDERS (OUTPATIENT)
Dept: ADMINISTRATIVE | Facility: HOSPITAL | Age: 59
End: 2019-01-14

## 2019-01-14 DIAGNOSIS — R00.2 PALPITATION: Primary | ICD-10-CM

## 2019-01-15 DIAGNOSIS — C50.919 MALIGNANT NEOPLASM OF BREAST IN FEMALE, ESTROGEN RECEPTOR POSITIVE, UNSPECIFIED LATERALITY, UNSPECIFIED SITE OF BREAST (HCC): ICD-10-CM

## 2019-01-15 DIAGNOSIS — Z17.0 MALIGNANT NEOPLASM OF BREAST IN FEMALE, ESTROGEN RECEPTOR POSITIVE, UNSPECIFIED LATERALITY, UNSPECIFIED SITE OF BREAST (HCC): ICD-10-CM

## 2019-01-15 RX ORDER — LETROZOLE 2.5 MG/1
2.5 TABLET, FILM COATED ORAL DAILY
Qty: 90 TABLET | Refills: 0 | Status: SHIPPED | OUTPATIENT
Start: 2019-01-15 | End: 2019-03-04 | Stop reason: SDUPTHER

## 2019-01-26 ENCOUNTER — HOSPITAL ENCOUNTER (OUTPATIENT)
Dept: NON INVASIVE DIAGNOSTICS | Facility: HOSPITAL | Age: 59
Discharge: HOME | End: 2019-01-26
Payer: COMMERCIAL

## 2019-01-26 DIAGNOSIS — R00.2 PALPITATION: ICD-10-CM

## 2019-01-26 PROCEDURE — 93306 TTE W/DOPPLER COMPLETE: CPT

## 2019-01-28 PROCEDURE — 93306 TTE W/DOPPLER COMPLETE: CPT | Performed by: INTERNAL MEDICINE

## 2019-02-04 DIAGNOSIS — E11.3293 TYPE 2 DIABETES MELLITUS WITH BOTH EYES AFFECTED BY MILD NONPROLIFERATIVE RETINOPATHY WITHOUT MACULAR EDEMA, WITHOUT LONG-TERM CURRENT USE OF INSULIN (HCC): ICD-10-CM

## 2019-02-04 RX ORDER — BLOOD-GLUCOSE METER
KIT MISCELLANEOUS
Qty: 300 EACH | Refills: 3 | Status: SHIPPED | OUTPATIENT
Start: 2019-02-04 | End: 2020-09-28

## 2019-02-20 ENCOUNTER — TELEPHONE (OUTPATIENT)
Dept: FAMILY MEDICINE CLINIC | Facility: CLINIC | Age: 59
End: 2019-02-20

## 2019-02-20 NOTE — TELEPHONE ENCOUNTER
Called patient to inform her of overdue labs  Patient has an appt set up    No further action required   Jerson Delarosa

## 2019-02-25 LAB
ALBUMIN SERPL-MCNC: 4.1 G/DL (ref 3.6–5.1)
ALBUMIN/CREAT UR: NORMAL MCG/MG CREAT
ALBUMIN/GLOB SERPL: 1.6 (CALC) (ref 1–2.5)
ALP SERPL-CCNC: 75 U/L (ref 33–130)
ALT SERPL-CCNC: 10 U/L (ref 6–29)
AST SERPL-CCNC: 16 U/L (ref 10–35)
BASOPHILS # BLD AUTO: 8 CELLS/UL (ref 0–200)
BASOPHILS NFR BLD AUTO: 0.1 %
BILIRUB SERPL-MCNC: 0.5 MG/DL (ref 0.2–1.2)
BUN SERPL-MCNC: 12 MG/DL (ref 7–25)
BUN/CREAT SERPL: ABNORMAL (CALC) (ref 6–22)
CALCIUM SERPL-MCNC: 9.5 MG/DL (ref 8.6–10.4)
CHLORIDE SERPL-SCNC: 101 MMOL/L (ref 98–110)
CHOLEST SERPL-MCNC: 139 MG/DL
CHOLEST/HDLC SERPL: 3.6 (CALC)
CO2 SERPL-SCNC: 27 MMOL/L (ref 20–32)
CREAT SERPL-MCNC: 1.04 MG/DL (ref 0.5–1.05)
CREAT UR-MCNC: 39 MG/DL (ref 20–275)
EOSINOPHIL # BLD AUTO: 0 CELLS/UL (ref 15–500)
EOSINOPHIL NFR BLD AUTO: 0 %
ERYTHROCYTE [DISTWIDTH] IN BLOOD BY AUTOMATED COUNT: 14.3 % (ref 11–15)
GLOBULIN SER CALC-MCNC: 2.6 G/DL (CALC) (ref 1.9–3.7)
GLUCOSE SERPL-MCNC: 128 MG/DL (ref 65–99)
HBA1C MFR BLD: 6.3 % OF TOTAL HGB
HCT VFR BLD AUTO: 36.7 % (ref 35–45)
HDLC SERPL-MCNC: 39 MG/DL
HGB BLD-MCNC: 12.1 G/DL (ref 11.7–15.5)
LDLC SERPL CALC-MCNC: 77 MG/DL (CALC)
LYMPHOCYTES # BLD AUTO: 1454 CELLS/UL (ref 850–3900)
LYMPHOCYTES NFR BLD AUTO: 18.4 %
MCH RBC QN AUTO: 26.2 PG (ref 27–33)
MCHC RBC AUTO-ENTMCNC: 33 G/DL (ref 32–36)
MCV RBC AUTO: 79.6 FL (ref 80–100)
MICROALBUMIN UR-MCNC: <0.2 MG/DL
MONOCYTES # BLD AUTO: 600 CELLS/UL (ref 200–950)
MONOCYTES NFR BLD AUTO: 7.6 %
NEUTROPHILS # BLD AUTO: 5838 CELLS/UL (ref 1500–7800)
NEUTROPHILS NFR BLD AUTO: 73.9 %
NONHDLC SERPL-MCNC: 100 MG/DL (CALC)
PLATELET # BLD AUTO: 286 THOUSAND/UL (ref 140–400)
PMV BLD REES-ECKER: 9.1 FL (ref 7.5–12.5)
POTASSIUM SERPL-SCNC: 4.6 MMOL/L (ref 3.5–5.3)
PROT SERPL-MCNC: 6.7 G/DL (ref 6.1–8.1)
RBC # BLD AUTO: 4.61 MILLION/UL (ref 3.8–5.1)
SL AMB EGFR AFRICAN AMERICAN: 69 ML/MIN/1.73M2
SL AMB EGFR NON AFRICAN AMERICAN: 60 ML/MIN/1.73M2
SODIUM SERPL-SCNC: 137 MMOL/L (ref 135–146)
TRIGL SERPL-MCNC: 136 MG/DL
WBC # BLD AUTO: 7.9 THOUSAND/UL (ref 3.8–10.8)

## 2019-02-27 LAB
CANCER AG125 SERPL-ACNC: 4 U/ML
CANCER AG15-3 SERPL-ACNC: 16 U/ML
CANCER AG27-29 SERPL-ACNC: 26 U/ML

## 2019-03-04 ENCOUNTER — OFFICE VISIT (OUTPATIENT)
Dept: FAMILY MEDICINE CLINIC | Facility: CLINIC | Age: 59
End: 2019-03-04
Payer: COMMERCIAL

## 2019-03-04 ENCOUNTER — OFFICE VISIT (OUTPATIENT)
Dept: HEMATOLOGY ONCOLOGY | Facility: MEDICAL CENTER | Age: 59
End: 2019-03-04
Payer: COMMERCIAL

## 2019-03-04 VITALS
HEART RATE: 79 BPM | HEIGHT: 63 IN | TEMPERATURE: 98.6 F | RESPIRATION RATE: 16 BRPM | SYSTOLIC BLOOD PRESSURE: 132 MMHG | WEIGHT: 112 LBS | OXYGEN SATURATION: 99 % | BODY MASS INDEX: 19.84 KG/M2 | DIASTOLIC BLOOD PRESSURE: 70 MMHG

## 2019-03-04 VITALS
TEMPERATURE: 98.5 F | DIASTOLIC BLOOD PRESSURE: 70 MMHG | RESPIRATION RATE: 16 BRPM | WEIGHT: 112.6 LBS | BODY MASS INDEX: 19.95 KG/M2 | HEIGHT: 63 IN | SYSTOLIC BLOOD PRESSURE: 131 MMHG | HEART RATE: 78 BPM

## 2019-03-04 DIAGNOSIS — E11.3293 TYPE 2 DIABETES MELLITUS WITH BOTH EYES AFFECTED BY MILD NONPROLIFERATIVE RETINOPATHY WITHOUT MACULAR EDEMA, WITHOUT LONG-TERM CURRENT USE OF INSULIN (HCC): Primary | ICD-10-CM

## 2019-03-04 DIAGNOSIS — E78.2 MIXED HYPERLIPIDEMIA: ICD-10-CM

## 2019-03-04 DIAGNOSIS — C50.911 ADENOCARCINOMA OF RIGHT BREAST (HCC): ICD-10-CM

## 2019-03-04 DIAGNOSIS — C50.919 MALIGNANT NEOPLASM OF BREAST IN FEMALE, ESTROGEN RECEPTOR POSITIVE, UNSPECIFIED LATERALITY, UNSPECIFIED SITE OF BREAST (HCC): ICD-10-CM

## 2019-03-04 DIAGNOSIS — C50.911 ADENOCARCINOMA OF RIGHT BREAST (HCC): Primary | ICD-10-CM

## 2019-03-04 DIAGNOSIS — E11.36 TYPE 2 DIABETES MELLITUS WITH DIABETIC CATARACT, WITHOUT LONG-TERM CURRENT USE OF INSULIN (HCC): ICD-10-CM

## 2019-03-04 DIAGNOSIS — Z17.0 MALIGNANT NEOPLASM OF BREAST IN FEMALE, ESTROGEN RECEPTOR POSITIVE, UNSPECIFIED LATERALITY, UNSPECIFIED SITE OF BREAST (HCC): ICD-10-CM

## 2019-03-04 DIAGNOSIS — I10 BENIGN ESSENTIAL HYPERTENSION: ICD-10-CM

## 2019-03-04 PROCEDURE — 99213 OFFICE O/P EST LOW 20 MIN: CPT | Performed by: INTERNAL MEDICINE

## 2019-03-04 PROCEDURE — 3725F SCREEN DEPRESSION PERFORMED: CPT | Performed by: FAMILY MEDICINE

## 2019-03-04 PROCEDURE — 1036F TOBACCO NON-USER: CPT | Performed by: FAMILY MEDICINE

## 2019-03-04 PROCEDURE — 99214 OFFICE O/P EST MOD 30 MIN: CPT | Performed by: FAMILY MEDICINE

## 2019-03-04 PROCEDURE — 3075F SYST BP GE 130 - 139MM HG: CPT | Performed by: FAMILY MEDICINE

## 2019-03-04 PROCEDURE — 3078F DIAST BP <80 MM HG: CPT | Performed by: FAMILY MEDICINE

## 2019-03-04 PROCEDURE — 3008F BODY MASS INDEX DOCD: CPT | Performed by: FAMILY MEDICINE

## 2019-03-04 RX ORDER — LETROZOLE 2.5 MG/1
2.5 TABLET, FILM COATED ORAL DAILY
Qty: 90 TABLET | Refills: 2 | Status: SHIPPED | OUTPATIENT
Start: 2019-03-04 | End: 2019-11-18 | Stop reason: SDUPTHER

## 2019-03-04 RX ORDER — PRAVASTATIN SODIUM 10 MG
10 TABLET ORAL DAILY
Qty: 90 TABLET | Refills: 1 | Status: SHIPPED | OUTPATIENT
Start: 2019-03-04 | End: 2019-07-15 | Stop reason: SDUPTHER

## 2019-03-04 RX ORDER — NEBIVOLOL 5 MG/1
5 TABLET ORAL DAILY
Qty: 90 TABLET | Refills: 1 | Status: SHIPPED | OUTPATIENT
Start: 2019-03-04 | End: 2019-11-17 | Stop reason: SDUPTHER

## 2019-03-04 RX ORDER — DOXYCYCLINE HYCLATE 100 MG/1
CAPSULE ORAL
Refills: 0 | COMMUNITY
Start: 2019-02-22 | End: 2019-05-01 | Stop reason: ALTCHOICE

## 2019-03-04 RX ORDER — AMLODIPINE AND OLMESARTAN MEDOXOMIL 5; 40 MG/1; MG/1
1 TABLET ORAL DAILY
Qty: 90 TABLET | Refills: 1 | Status: SHIPPED | OUTPATIENT
Start: 2019-03-04 | End: 2019-04-11

## 2019-03-04 NOTE — PROGRESS NOTES
Saida Nelson  1960  [facility] [address] [place] [id number]    DISCUSSIONS/SUMMARY:    67-year-old female with history of recurrent right breast cancer presently on Femara  Mrs Nelson (formally Libia Harris) feels well and clinically there are no troubling signs or any signs of breast cancer recurrence  Recent laboratory tests were good/acceptable; CA 27, 29 and CA-15-3 as well as the  all within normal limits  The October 2018 mammogram was good/WNL  Patient is to continue with the self breast exams and yearly mammograms  Patient is to return in 4 months with repeat blood work before (patient's wish for a four-month follow-up with labs before)  Femara was renewed  We re-discussed what to monitor for in regard to disease progression  Previous DEXA scan demonstrated osteopenia  Patient is on calcium and vitamin D as well as working out at [employer]  Recently we discussed Prolia - patient deferred  Routine health maintenance and medical care is up-to-date  Patient knows to call if she has any other oncology questions or concerns  Carefully review your medication list and verify that the list is accurate and up-to-date  Please call the oncology office if there are medications missing from the list, medications on the list that you are not currently taking or if there is a dosage or instruction that is different from how you're taking a medication  __________________________________________________________________________________________________    Chief Complaint   Patient presents with    Follow-up     Recurrent breast cancer on Femara     History of Present Illness:    63 female with a complicated past breast cancer history back for follow-up  Patient was first diagnosed in 2003 and underwent a right modified radical mastectomy in December 2003   Pathology results from [facility] in [place] [address] [address] demonstrated invasive ductal carcinoma, 0 5 cm with focal lymphovascular invasion  There was a second right breast mass also invasive, 4 0 by 2 5 cm, moderately differentiated  Patient also had focal DCIS, margins were free of tumor  Right axillary contents demonstrated 4/17 positive lymph nodes  Final stage was IIIA (pT2 pN2a M0)  Patient states that she refused adjuvant chemotherapy at that time  It is never been clear why patient was not offered adjuvant hormonal manipulation  Subsequently patient moved to [place] and in 2007 began to have pain in her right shoulder  Workup demonstrated 2 nodules within the shoulder muscle  One nodule was removed and demonstrated metastatic carcinoma  Patient underwent radiotherapy without complications and was placed on tamoxifen - completed 5 years (started in January 2008)  Patient subsequently began Femara and has completed 6+ years of Femara  Patient returns for follow-up and is accompanied by her   Mrs Nelson states feeling okay now - patient had a lingering URI over the past 3 weeks (cough, congestion, no fevers)  Appetite is okay, weight is stable  Activities are back to normal   No pain control issues  No problems with the Femara specifically  No GI,  or GYN issues  Patient has an occasional hot flash but no different than before  Review of Systems   Constitutional: Negative  HENT: Negative  Eyes: Negative  Respiratory: Negative  Cardiovascular: Negative  Gastrointestinal: Negative  Endocrine: Negative  Genitourinary: Negative  Musculoskeletal: Negative  Skin: Negative  Allergic/Immunologic: Negative  Neurological: Negative  Hematological: Negative  Psychiatric/Behavioral: Negative  All other systems reviewed and are negative       Patient Active Problem List   Diagnosis    Aden[facility] of [facility] ([facility] )    Seasonal allergic rhinitis due to pollen    Anemia    Benign essential hypertension    Breast cancer ([place] [address] )    Mixed hyperlipidemia    Osteoporosis    Type 2 diabetes mellitus with both eyes affected by mild nonproliferative retinopathy without macular edema, without long-term current use of insulin (HCC)     Past Medical History:   Diagnosis Date    Abnormal liver function test     Acute upper respiratory infection 04/17/2008    Breast cancer ([facility] )     Cough 08/07/2008    Diabetes mellitus ([place] [address] )     History of acute sinusitis 09/28/2010    History of diarrhea     History of hypertension     History of motion sickness 08/07/2008    History of orthopnea 01/10/2008     Past Surgical History:   Procedure Laterality Date    BREAST LUMPECTOMY      MASTECTOMY      TOOTH EXTRACTION       Family History   Problem Relation Age of Onset    Hypertension Father     Diabetes Family     Heart disease Family     Parkinsonism Mother     Dementia Mother     Mental illness Neg Hx      Social History     Socioeconomic History    Marital status: /Civil Union     Spouse name: Not on file    Number of children: Not on file    Years of education: Not on file    Highest education level: Not on file   Occupational History    Not on file   Social Needs    Financial resource strain: Not on file    Food insecurity:     Worry: Not on file     Inability: Not on file    Transportation needs:     Medical: Not on file     Non-medical: Not on file   Tobacco Use    Smoking status: Never Smoker    Smokeless tobacco: Never Used   Substance and Sexual Activity    Alcohol use: Yes     Comment: social drinker    Drug use: Not on file    Sexual activity: Not on file   Lifestyle    Physical activity:     Days per week: Not on file     Minutes per session: Not on file    Stress: Not on file   Relationships    Social connections:     Talks on phone: Not on file     Gets together: Not on file     Attends [employer] service: Not on file     Active member of club or organization: Not on file     Attends meetings of clubs or organizations: Not on file     Relationship status: Not on file    Intimate partner violence:     Fear of current or ex partner: Not on file     Emotionally abused: Not on file     Physically abused: Not on file     Forced sexual activity: Not on file   Other Topics Concern    Not on file   Social History Narrative    Not on file       Current Outpatient Medications:     amlodipine-olmesartan (EMANUEL) 5-40 MG, Take 1 tablet by mouth daily, Disp: 90 tablet, Rfl: 1    aspirin 81 MG tablet, Take by mouth daily, Disp: , Rfl:     doxycycline hyclate (VIBRAMYCIN) 100 mg capsule, , Disp: , Rfl: 0    fluticasone (FLONASE) 50 mcg/act nasal spray, USE 2 SPRAYS IN EACH NOSTRIL DAILY, Disp: 48 g, Rfl: 1    GLUCOCARD VITAL TEST test strip, TEST 5 TIMES DAILY, Disp: 300 each, Rfl: 3    Glucosamine-Chondroitin 250-200 MG TABS, Take by mouth, Disp: , Rfl:     letrozole (FEMARA) 2 5 mg tablet, Take 1 tablet (2 5 mg total) by mouth daily, Disp: 90 tablet, Rfl: 2    Multiple Vitamins-Iron (QC DAILY MULTIVITAMINS/IRON) TABS, Take by mouth daily, Disp: , Rfl:     NAFTIN 2 % GEL, APPLY TOPICALLY TWICE DAILY PRN, Disp: , Rfl: 0    nebivolol (BYSTOLIC) 5 mg tablet, Take 1 tablet (5 mg total) by mouth daily for 90 days, Disp: 90 tablet, Rfl: 1    pravastatin (PRAVACHOL) 10 mg tablet, Take 1 tablet (10 mg total) by mouth daily, Disp: 90 tablet, Rfl: 1    Probiotic Product (PROBIOTIC DAILY) CAPS, Take by mouth, Disp: , Rfl:     sAXagliptin-metFORMIN ER (KOMBIGLYZE XR) 5-1000 MG TB24, Take 1 tablet by mouth daily, Disp: 90 tablet, Rfl: 1     Allergies   Allergen Reactions    Erythromycin Swelling     Reaction Date: 23Aug2007;     Penicillins Hives     Reaction Date: 23Aug2007;     Codeine Nausea Only     Reaction Date: 23Aug2007;     Sulfate Rash and Fever     Reaction Date: 23Aug2007;      Vitals:    03/04/19 1540   BP: 132/70   Pulse: 79   Resp: 16   Temp: 98 6 °F (37 °C)   SpO2: 99%     Physical Exam Constitutional: She is oriented to person, place, and time  She appears well-developed and well-nourished  Well-nourished female, no respiratory distress   HENT:   Head: Normocephalic and atraumatic  Right Ear: External ear normal    Left Ear: External ear normal    Nose: Nose normal    Mouth/Throat: Oropharynx is clear and moist    Eyes: Pupils are equal, round, and reactive to light  Conjunctivae and EOM are normal    Neck: Normal range of motion  Neck supple  Supple, no JVD   Cardiovascular: Normal rate, regular rhythm, normal heart sounds and intact distal pulses  Pulmonary/Chest: Effort normal and breath sounds normal    Good air entry bilaterally, clear   Abdominal: Soft  Bowel sounds are normal    Soft, nontender, +bowel sounds, no hepatosplenomegaly, no rigidity or rebound   Musculoskeletal: Normal range of motion  Neurological: She is alert and oriented to person, place, and time  She has normal reflexes  Skin: Skin is warm  Warm, moist, good color, no petechiae or ecchymoses   Psychiatric: She has a normal mood and affect   Her behavior is normal  Judgment and thought content normal    Breasts:  Deferred, no axillary adenopathy bilaterally  Extremities:  No lower extremity edema, no cords, pulses are 1+  Lymphatics:   No adenopathy in the neck, supraclavicular region, axilla and groin bilaterally    Performance Status: ECOG = 0    Labs    02/23/2019 WBC = 7 9 hemoglobin = 12 1 hematocrit = 36 7 MCV = 80 RDW = 14 3 platelet = 869 neutrophil = 74% lymphocytes = 18% monocyte = 8% BUN = 12 creatinine = 1 04 calcium = 9 5 AST = 16 ALT = 10 alkaline phosphatase = 75 total bilirubin = 0 5 CA 27, 29 = 26 CA 15-3 = 16  = 4    10/20/2018 WBC = 6 6 hemoglobin = 12 3 hematocrit = 36 7 platelet = 480 neutrophil = 68% BUN = 22 creatinine = 1 08 LFTs WNL CA 15-3 = 16  = 6 CA 27, 29 = 24   5/19/18 Ca 27,29 = 21 Ca 15-3 = 17 Ca-125 = 5 LFTs WNL  H/H = 13 1/39 8  1/20/18 WBC = 6 6 hemoglobin = 12 9 hematocrit = 39 7 platelet = 472 neutrophil = 75% BUN = 21          creatinine = 1 00 calcium = 9 6 LFTs WNL CA 27, 29 = 24     CA 15-3 = 14      = 5  8/26/17 BUN = 24 creatinine = 1 07 LFTs WNL calcium = 9 8 WBC = 7 1 hemoglobin = 13 hematocrit = 39 8 platelet = 997  = 6  5/19/17 CA 15-3 = 17 CA 27, 29 = 35  = 5   2/11/17 WBC = 7 2 hemoglobin = 12 7 hematocrit = 39 platelet = 833 BUN = 19 creatinine = 1 06 calcium = 9 6 LFTs WNL CA-125 = 7 CA 15-3 = 28 CA 27, 29 = 59  10/1/16  CA 15-3 = 15 CA 27, 29 = 40  = 7  5/21/16 6 CA 15-3 = 23 CA-125 = 7 CA 27, 29 = 41  2/20/16  Ca-125 = 6 CA 27, 29 = 47 LFTs WNL calcium = 9 7    Imaging    10/29/2018 diagnostic mammogram impression stated no mammographic evidence of malignancy of left breast, category 2 (right-sided mastectomy)  10/13/17 diagnostic left mammogram = category 1, negative  10/10/16 left unilateral digital mammogram with CAD was read as category 1, negative  3/3/17 DEXA scan demonstrated osteopenia  8/7/15 left unilateral digital mammogram was category 1, negative  3/12/15 DEXA scan demonstrated WHO classification osteopenia  8/1/14 left digital diagnostic mammogram was category 1, negative  Pathology    6/25/15 BRCA1/2 sequencing and deletion/duplication analysis = negative

## 2019-03-04 NOTE — PROGRESS NOTES
Assessment/Plan:    Problem List Items Addressed This Visit        Endocrine    Type 2 diabetes mellitus with both eyes affected by mild nonproliferative retinopathy without macular edema, without long-term current use of insulin (HCC) - Primary    Relevant Medications    amlodipine-olmesartan (EMANUEL) 5-40 MG    sAXagliptin-metFORMIN ER (KOMBIGLYZE XR) 5-1000 MG TB24       Cardiovascular and Mediastinum    Benign essential hypertension    Relevant Medications    nebivolol (BYSTOLIC) 5 mg tablet    amlodipine-olmesartan (EMANUEL) 5-40 MG       Other    Adenocarcinoma of breast (HCC)    Mixed hyperlipidemia    Relevant Medications    pravastatin (PRAVACHOL) 10 mg tablet      Other Visit Diagnoses     Type 2 diabetes mellitus with diabetic cataract, without long-term current use of insulin (HCC)        Relevant Medications    amlodipine-olmesartan (EMANUEL) 5-40 MG    sAXagliptin-metFORMIN ER (KOMBIGLYZE XR) 5-1000 MG TB24          BMI Counseling: Body mass index is 20 27 kg/m²  Discussed the patient's BMI with her  The BMI is in the acceptable range  There are no Patient Instructions on file for this visit  Return in about 4 months (around 7/4/2019) for Recheck  Subjective:      Patient ID: Shireen Macario is a 63 y o  female  Chief Complaint   Patient presents with    Follow-up     4mo f/u  Pt had labs done 2/23/19   Diabetes    Hypertension     [facility]       Pt is here for a three month follow up  Pt will be seeing Dr Martin Robles after this visit  Pt was sick for a few weeks but feels better now  Pt denies polyuria no polydipsuia    Pt states she will need refills on all her meds      The following portions of the patient's history were reviewed and updated as appropriate: allergies, current medications, past family history, past medical history, past social history, past surgical history and problem list     Review of Systems   Constitutional: Negative    Negative for activity change, appetite change, chills, diaphoresis and fatigue  HENT: Negative  Negative for dental problem, ear pain, sinus pressure and sore throat  Eyes: Negative  Negative for photophobia, pain, discharge, redness, itching and visual disturbance  Respiratory: Negative for apnea and chest tightness  Cardiovascular: Negative  Negative for chest pain, palpitations and leg swelling  Gastrointestinal: Negative  Negative for abdominal distention, abdominal pain, constipation and diarrhea  Endocrine: Negative  Negative for cold intolerance and heat intolerance  Genitourinary: Negative  Negative for difficulty urinating and dyspareunia  Musculoskeletal: Negative  Negative for arthralgias and back pain  Skin: Negative  Allergic/Immunologic: Negative for environmental allergies  Neurological: Negative  Negative for dizziness  Psychiatric/Behavioral: Negative  Negative for agitation           Current Outpatient Medications   Medication Sig Dispense Refill    amlodipine-olmesartan (EMANUEL) 5-40 MG Take 1 tablet by mouth daily 90 tablet 1    aspirin 81 MG tablet Take by mouth daily      doxycycline hyclate (VIBRAMYCIN) 100 mg capsule   0    fluticasone (FLONASE) 50 mcg/act nasal spray USE 2 SPRAYS IN EACH NOSTRIL DAILY 48 g 1    GLUCOCARD VITAL TEST test strip TEST 5 TIMES DAILY 300 each 3    Glucosamine-Chondroitin 250-200 MG TABS Take by mouth      letrozole (FEMARA) 2 5 mg tablet Take 1 tablet (2 5 mg total) by mouth daily 90 tablet 0    Multiple Vitamins-Iron (QC DAILY MULTIVITAMINS/IRON) TABS Take by mouth daily      NAFTIN 2 % GEL APPLY TOPICALLY TWICE DAILY PRN  0    nebivolol (BYSTOLIC) 5 mg tablet Take 1 tablet (5 mg total) by mouth daily for 90 days 90 tablet 1    pravastatin (PRAVACHOL) 10 mg tablet Take 1 tablet (10 mg total) by mouth daily 90 tablet 1    Probiotic Product (PROBIOTIC DAILY) CAPS Take by mouth      sAXagliptin-metFORMIN ER (KOMBIGLYZE XR) 5-1000 MG TB24 Take 1 tablet by mouth daily 90 tablet 1     No current facility-administered medications for this visit  Objective:    /70   Pulse 78   Temp 98 5 °F (36 9 °C)   Resp 16   Ht 5' 2 5" (1 588 m)   Wt 51 1 kg (112 lb 9 6 oz)   BMI 20 27 kg/m²        Physical Exam   Constitutional: She appears well-developed and well-nourished  No distress  HENT:   Head: Normocephalic and atraumatic  Right Ear: External ear normal    Left Ear: External ear normal    Nose: Nose normal    Mouth/Throat: Oropharynx is clear and moist  No oropharyngeal exudate  Eyes: Pupils are equal, round, and reactive to light  EOM are normal  Right eye exhibits no discharge  Left eye exhibits no discharge  No scleral icterus  Neck: No thyromegaly present  Cardiovascular: Normal rate and normal heart sounds  No murmur heard  Pulmonary/Chest: Effort normal and breath sounds normal  No respiratory distress  She has no wheezes  Abdominal: Soft  Bowel sounds are normal  She exhibits no distension and no mass  There is no tenderness  There is no rebound and no guarding  Musculoskeletal: Normal range of motion  Neurological: She is alert  She displays normal reflexes  Coordination normal    Skin: Skin is warm and dry  No rash noted  She is not diaphoretic  No erythema  Psychiatric: She has a normal mood and affect  Her behavior is normal    Nursing note and vitals reviewed             Recent Results (from the past 672 hour(s))   Lipid Panel with Direct LDL reflex    Collection Time: 02/23/19  7:42 AM   Result Value Ref Range    Total Cholesterol 139 <200 mg/dL    HDL 39 (L) >50 mg/dL    Triglycerides 136 <150 mg/dL    LDL Direct 77 mg/dL (calc)    Chol HDLC Ratio 3 6 <5 0 (calc)    Non-HDL Cholesterol 100 <130 mg/dL (calc)   Comprehensive metabolic panel    Collection Time: 02/23/19  7:42 AM   Result Value Ref Range    Glucose, Random 128 (H) 65 - 99 mg/dL    BUN 12 7 - 25 mg/dL    Creatinine 1 04 0 50 - 1 05 mg/dL    eGFR Non  60 > OR = 60 mL/min/1 73m2    eGFR  69 > OR = 60 mL/min/1 73m2    SL AMB BUN/CREATININE RATIO NOT APPLICABLE 6 - 22 (calc)    Sodium 137 135 - 146 mmol/L    Potassium 4 6 3 5 - 5 3 mmol/L    Chloride 101 98 - 110 mmol/L    CO2 27 20 - 32 mmol/L    SL AMB CALCIUM 9 5 8 6 - 10 4 mg/dL    Protein, Total 6 7 6 1 - 8 1 g/dL    Albumin 4 1 3 6 - 5 1 g/dL    Globulin 2 6 1 9 - 3 7 g/dL (calc)    Albumin/Globulin Ratio 1 6 1 0 - 2 5 (calc)    TOTAL BILIRUBIN 0 5 0 2 - 1 2 mg/dL    Alkaline Phosphatase 75 33 - 130 U/L    AST 16 10 - 35 U/L    ALT 10 6 - 29 U/L   Microalbumin, Random Urine (W/Creatinine)    Collection Time: 02/23/19  7:42 AM   Result Value Ref Range    Creatinine, Urine 39 20 - 275 mg/dL    Microalbum  ,U,Random <0 2 See Note: mg/dL    Microalb/Creat Ratio NOTE <30 mcg/mg creat   CBC and differential    Collection Time: 02/23/19  7:42 AM   Result Value Ref Range    White Blood Cell Count 7 9 3 8 - 10 8 Thousand/uL    Red Blood Cell Count 4 61 3 80 - 5 10 Million/uL    Hemoglobin 12 1 11 7 - 15 5 g/dL    HCT 36 7 35 0 - 45 0 %    MCV 79 6 (L) 80 0 - 100 0 fL    MCH 26 2 (L) 27 0 - 33 0 pg    MCHC 33 0 32 0 - 36 0 g/dL    RDW 14 3 11 0 - 15 0 %    Platelet Count 624 637 - 400 Thousand/uL    SL AMB MPV 9 1 7 5 - 12 5 fL    Neutrophils (Absolute) 5,838 1,500 - 7,800 cells/uL    Lymphocytes (Absolute) 1,454 850 - 3,900 cells/uL    Monocytes (Absolute) 600 200 - 950 cells/uL    Eosinophils (Absolute) 0 (L) 15 - 500 cells/uL    Basophils ABS 8 0 - 200 cells/uL    Neutrophils 73 9 %    Lymphocytes 18 4 %    Monocytes 7 6 %    Eosinophils 0 0 %    Basophils PCT 0 1 %   Hemoglobin A1c (w/out EAG)    Collection Time: 02/23/19  7:42 AM   Result Value Ref Range    Hemoglobin A1C 6 3 (H) <5 7 % of total Hgb   Cancer antigen 27 29    Collection Time: 02/23/19  7:46 AM   Result Value Ref Range    CA 27 29 26 <38 U/mL   Cancer antigen 15-3    Collection Time: 02/23/19  7:46 AM   Result Value Ref Range    CA 15-3 16 <32 U/mL       Collection Time: 02/23/19  7:46 AM   Result Value Ref Range    Cancer Antigen (CA) 125 4 <35 U/mL         Gladys Hancock DO

## 2019-03-23 LAB
LEFT EYE DIABETIC RETINOPATHY: NORMAL
RIGHT EYE DIABETIC RETINOPATHY: NORMAL

## 2019-04-11 ENCOUNTER — TELEPHONE (OUTPATIENT)
Dept: FAMILY MEDICINE CLINIC | Facility: CLINIC | Age: 59
End: 2019-04-11

## 2019-04-11 DIAGNOSIS — I10 BENIGN ESSENTIAL HYPERTENSION: Primary | ICD-10-CM

## 2019-04-11 RX ORDER — TELMISARTAN 80 MG/1
80 TABLET ORAL DAILY
Qty: 30 TABLET | Refills: 1 | Status: SHIPPED | OUTPATIENT
Start: 2019-04-11 | End: 2019-05-01 | Stop reason: SDUPTHER

## 2019-04-27 DIAGNOSIS — J30.1 SEASONAL ALLERGIC RHINITIS DUE TO POLLEN: ICD-10-CM

## 2019-04-27 RX ORDER — FLUTICASONE PROPIONATE 50 MCG
SPRAY, SUSPENSION (ML) NASAL
Qty: 48 G | Refills: 1 | Status: SHIPPED | OUTPATIENT
Start: 2019-04-27 | End: 2019-11-17 | Stop reason: SDUPTHER

## 2019-05-01 ENCOUNTER — TELEPHONE (OUTPATIENT)
Dept: FAMILY MEDICINE CLINIC | Facility: CLINIC | Age: 59
End: 2019-05-01

## 2019-05-01 ENCOUNTER — OFFICE VISIT (OUTPATIENT)
Dept: FAMILY MEDICINE CLINIC | Facility: CLINIC | Age: 59
End: 2019-05-01
Payer: COMMERCIAL

## 2019-05-01 VITALS
TEMPERATURE: 98.5 F | WEIGHT: 115 LBS | RESPIRATION RATE: 16 BRPM | HEART RATE: 82 BPM | HEIGHT: 63 IN | DIASTOLIC BLOOD PRESSURE: 76 MMHG | SYSTOLIC BLOOD PRESSURE: 128 MMHG | BODY MASS INDEX: 20.38 KG/M2

## 2019-05-01 DIAGNOSIS — L30.9 DERMATITIS: ICD-10-CM

## 2019-05-01 DIAGNOSIS — I10 BENIGN ESSENTIAL HYPERTENSION: Primary | ICD-10-CM

## 2019-05-01 PROCEDURE — 99213 OFFICE O/P EST LOW 20 MIN: CPT | Performed by: FAMILY MEDICINE

## 2019-05-01 PROCEDURE — 1036F TOBACCO NON-USER: CPT | Performed by: FAMILY MEDICINE

## 2019-05-01 PROCEDURE — 3074F SYST BP LT 130 MM HG: CPT | Performed by: FAMILY MEDICINE

## 2019-05-01 PROCEDURE — 3008F BODY MASS INDEX DOCD: CPT | Performed by: FAMILY MEDICINE

## 2019-05-01 PROCEDURE — 3078F DIAST BP <80 MM HG: CPT | Performed by: FAMILY MEDICINE

## 2019-05-01 PROCEDURE — 4010F ACE/ARB THERAPY RXD/TAKEN: CPT | Performed by: FAMILY MEDICINE

## 2019-05-01 RX ORDER — TELMISARTAN 40 MG/1
40 TABLET ORAL DAILY
Qty: 90 TABLET | Refills: 1 | Status: SHIPPED | OUTPATIENT
Start: 2019-05-01 | End: 2019-12-31 | Stop reason: SDUPTHER

## 2019-05-01 RX ORDER — MOMETASONE FUROATE 1 MG/G
CREAM TOPICAL DAILY
Qty: 45 G | Refills: 0 | Status: SHIPPED | OUTPATIENT
Start: 2019-05-01 | End: 2019-06-21 | Stop reason: SDUPTHER

## 2019-05-04 ENCOUNTER — TELEPHONE (OUTPATIENT)
Dept: FAMILY MEDICINE CLINIC | Facility: CLINIC | Age: 59
End: 2019-05-04

## 2019-05-04 DIAGNOSIS — E11.3293 TYPE 2 DIABETES MELLITUS WITH BOTH EYES AFFECTED BY MILD NONPROLIFERATIVE RETINOPATHY WITHOUT MACULAR EDEMA, WITHOUT LONG-TERM CURRENT USE OF INSULIN (HCC): Primary | ICD-10-CM

## 2019-05-06 RX ORDER — METFORMIN HYDROCHLORIDE 1000 MG/1
1000 TABLET, FILM COATED, EXTENDED RELEASE ORAL
Qty: 90 TABLET | Refills: 1 | Status: SHIPPED | OUTPATIENT
Start: 2019-05-06 | End: 2019-05-13 | Stop reason: SDUPTHER

## 2019-05-10 ENCOUNTER — TELEPHONE (OUTPATIENT)
Dept: FAMILY MEDICINE CLINIC | Facility: CLINIC | Age: 59
End: 2019-05-10

## 2019-05-10 DIAGNOSIS — E11.3293 TYPE 2 DIABETES MELLITUS WITH BOTH EYES AFFECTED BY MILD NONPROLIFERATIVE RETINOPATHY WITHOUT MACULAR EDEMA, WITHOUT LONG-TERM CURRENT USE OF INSULIN (HCC): ICD-10-CM

## 2019-05-13 RX ORDER — METFORMIN HYDROCHLORIDE 1000 MG/1
1000 TABLET, FILM COATED, EXTENDED RELEASE ORAL
Qty: 90 TABLET | Refills: 1 | Status: SHIPPED | OUTPATIENT
Start: 2019-05-13 | End: 2019-05-20

## 2019-05-16 ENCOUNTER — TELEPHONE (OUTPATIENT)
Dept: FAMILY MEDICINE CLINIC | Facility: CLINIC | Age: 59
End: 2019-05-16

## 2019-05-20 DIAGNOSIS — E11.3293 TYPE 2 DIABETES MELLITUS WITH BOTH EYES AFFECTED BY MILD NONPROLIFERATIVE RETINOPATHY WITHOUT MACULAR EDEMA, WITHOUT LONG-TERM CURRENT USE OF INSULIN (HCC): Primary | ICD-10-CM

## 2019-05-21 ENCOUNTER — TELEPHONE (OUTPATIENT)
Dept: FAMILY MEDICINE CLINIC | Facility: CLINIC | Age: 59
End: 2019-05-21

## 2019-06-21 DIAGNOSIS — L30.9 DERMATITIS: ICD-10-CM

## 2019-06-21 RX ORDER — MOMETASONE FUROATE 1 MG/G
CREAM TOPICAL
Qty: 45 G | Refills: 0 | Status: SHIPPED | OUTPATIENT
Start: 2019-06-21 | End: 2020-09-28 | Stop reason: ALTCHOICE

## 2019-06-29 LAB — HBA1C MFR BLD: 6.1 % OF TOTAL HGB

## 2019-06-30 LAB
ALBUMIN SERPL-MCNC: 4.3 G/DL (ref 3.6–5.1)
ALBUMIN/GLOB SERPL: 1.6 (CALC) (ref 1–2.5)
ALP SERPL-CCNC: 74 U/L (ref 33–130)
ALT SERPL-CCNC: 13 U/L (ref 6–29)
AST SERPL-CCNC: 18 U/L (ref 10–35)
BASOPHILS # BLD AUTO: 0 CELLS/UL (ref 0–200)
BASOPHILS NFR BLD AUTO: 0 %
BILIRUB SERPL-MCNC: 0.4 MG/DL (ref 0.2–1.2)
BUN SERPL-MCNC: 21 MG/DL (ref 7–25)
BUN/CREAT SERPL: NORMAL (CALC) (ref 6–22)
CALCIUM SERPL-MCNC: 9.8 MG/DL (ref 8.6–10.4)
CANCER AG125 SERPL-ACNC: 4 U/ML
CANCER AG15-3 SERPL-ACNC: 13 U/ML
CANCER AG27-29 SERPL-ACNC: 20 U/ML
CHLORIDE SERPL-SCNC: 101 MMOL/L (ref 98–110)
CO2 SERPL-SCNC: 28 MMOL/L (ref 20–32)
CREAT SERPL-MCNC: 1 MG/DL (ref 0.5–1.05)
EOSINOPHIL # BLD AUTO: 0 CELLS/UL (ref 15–500)
EOSINOPHIL NFR BLD AUTO: 0 %
ERYTHROCYTE [DISTWIDTH] IN BLOOD BY AUTOMATED COUNT: 13.8 % (ref 11–15)
GLOBULIN SER CALC-MCNC: 2.7 G/DL (CALC) (ref 1.9–3.7)
GLUCOSE SERPL-MCNC: 103 MG/DL (ref 65–139)
HCT VFR BLD AUTO: 38.5 % (ref 35–45)
HGB BLD-MCNC: 12.9 G/DL (ref 11.7–15.5)
LYMPHOCYTES # BLD AUTO: 1883 CELLS/UL (ref 850–3900)
LYMPHOCYTES NFR BLD AUTO: 21.4 %
MCH RBC QN AUTO: 27.4 PG (ref 27–33)
MCHC RBC AUTO-ENTMCNC: 33.5 G/DL (ref 32–36)
MCV RBC AUTO: 81.9 FL (ref 80–100)
MONOCYTES # BLD AUTO: 563 CELLS/UL (ref 200–950)
MONOCYTES NFR BLD AUTO: 6.4 %
NEUTROPHILS # BLD AUTO: 6354 CELLS/UL (ref 1500–7800)
NEUTROPHILS NFR BLD AUTO: 72.2 %
PLATELET # BLD AUTO: 244 THOUSAND/UL (ref 140–400)
PMV BLD REES-ECKER: 9.6 FL (ref 7.5–12.5)
POTASSIUM SERPL-SCNC: 3.8 MMOL/L (ref 3.5–5.3)
PROT SERPL-MCNC: 7 G/DL (ref 6.1–8.1)
RBC # BLD AUTO: 4.7 MILLION/UL (ref 3.8–5.1)
SL AMB EGFR AFRICAN AMERICAN: 72 ML/MIN/1.73M2
SL AMB EGFR NON AFRICAN AMERICAN: 62 ML/MIN/1.73M2
SODIUM SERPL-SCNC: 139 MMOL/L (ref 135–146)
WBC # BLD AUTO: 8.8 THOUSAND/UL (ref 3.8–10.8)

## 2019-07-15 ENCOUNTER — OFFICE VISIT (OUTPATIENT)
Dept: FAMILY MEDICINE CLINIC | Facility: CLINIC | Age: 59
End: 2019-07-15
Payer: COMMERCIAL

## 2019-07-15 ENCOUNTER — OFFICE VISIT (OUTPATIENT)
Dept: HEMATOLOGY ONCOLOGY | Facility: MEDICAL CENTER | Age: 59
End: 2019-07-15
Payer: COMMERCIAL

## 2019-07-15 VITALS
WEIGHT: 116 LBS | HEART RATE: 73 BPM | OXYGEN SATURATION: 99 % | RESPIRATION RATE: 18 BRPM | DIASTOLIC BLOOD PRESSURE: 72 MMHG | TEMPERATURE: 99.7 F | BODY MASS INDEX: 20.55 KG/M2 | HEIGHT: 63 IN | SYSTOLIC BLOOD PRESSURE: 122 MMHG

## 2019-07-15 VITALS
SYSTOLIC BLOOD PRESSURE: 128 MMHG | BODY MASS INDEX: 20.55 KG/M2 | TEMPERATURE: 98.2 F | HEART RATE: 68 BPM | RESPIRATION RATE: 16 BRPM | WEIGHT: 116 LBS | HEIGHT: 63 IN | DIASTOLIC BLOOD PRESSURE: 82 MMHG

## 2019-07-15 DIAGNOSIS — E11.3293 TYPE 2 DIABETES MELLITUS WITH BOTH EYES AFFECTED BY MILD NONPROLIFERATIVE RETINOPATHY WITHOUT MACULAR EDEMA, WITHOUT LONG-TERM CURRENT USE OF INSULIN (HCC): Primary | ICD-10-CM

## 2019-07-15 DIAGNOSIS — I10 BENIGN ESSENTIAL HYPERTENSION: ICD-10-CM

## 2019-07-15 DIAGNOSIS — L50.9 HIVES: ICD-10-CM

## 2019-07-15 DIAGNOSIS — E78.2 MIXED HYPERLIPIDEMIA: ICD-10-CM

## 2019-07-15 DIAGNOSIS — C50.911 ADENOCARCINOMA OF RIGHT BREAST (HCC): Primary | ICD-10-CM

## 2019-07-15 PROCEDURE — 1036F TOBACCO NON-USER: CPT | Performed by: FAMILY MEDICINE

## 2019-07-15 PROCEDURE — 3079F DIAST BP 80-89 MM HG: CPT | Performed by: FAMILY MEDICINE

## 2019-07-15 PROCEDURE — 99213 OFFICE O/P EST LOW 20 MIN: CPT | Performed by: INTERNAL MEDICINE

## 2019-07-15 PROCEDURE — 3074F SYST BP LT 130 MM HG: CPT | Performed by: FAMILY MEDICINE

## 2019-07-15 PROCEDURE — 99214 OFFICE O/P EST MOD 30 MIN: CPT | Performed by: FAMILY MEDICINE

## 2019-07-15 RX ORDER — PNV NO.95/FERROUS FUM/FOLIC AC 28MG-0.8MG
TABLET ORAL DAILY
COMMUNITY
End: 2022-06-27

## 2019-07-15 RX ORDER — PRAVASTATIN SODIUM 10 MG
5 TABLET ORAL DAILY
Qty: 90 TABLET | Refills: 1
Start: 2019-07-15 | End: 2019-12-31 | Stop reason: SDUPTHER

## 2019-07-15 NOTE — PROGRESS NOTES
Assessment/Plan:    Problem List Items Addressed This Visit        Endocrine    Type 2 diabetes mellitus with both eyes affected by mild nonproliferative retinopathy without macular edema, without long-term current use of insulin (HCC) - Primary     Lab Results   Component Value Date    HGBA1C 6 1 (H) 06/28/2019       No results for input(s): POCGLU in the last 72 hours  Blood Sugar Average: Last 72 hrs:           Relevant Orders    Comprehensive metabolic panel    Hemoglobin A1C    CBC    Lipid Panel with Direct LDL reflex       Cardiovascular and Mediastinum    Benign essential hypertension     stable         Relevant Orders    Comprehensive metabolic panel    Hemoglobin A1C    CBC    Lipid Panel with Direct LDL reflex       Musculoskeletal and Integument    Hives     Pt has a number of labs other than elevated complemet level they are ok         Relevant Orders    Comprehensive metabolic panel    Hemoglobin A1C    CBC    Lipid Panel with Direct LDL reflex       Other    Mixed hyperlipidemia     excellent         Relevant Medications    pravastatin (PRAVACHOL) 10 mg tablet    Other Relevant Orders    Comprehensive metabolic panel    Hemoglobin A1C    CBC    Lipid Panel with Direct LDL reflex          BMI Counseling: Body mass index is 20 88 kg/m²  Discussed the patient's BMI with her  The BMI is above average  BMI counseling and education was provided to the patient  Nutrition recommendations include reducing portion sizes  There are no Patient Instructions on file for this visit  Return in about 6 months (around 1/15/2020)  Subjective:      Patient ID: Tigre Milan is a 63 y o  female  Chief Complaint   Patient presents with    Diabetes     BP and Diabetes check [id number]    Follow-up       Pt is herer for her follow up of chronic conic conditions  Pt deniesd CP no SOB    Pt states she was seen by derm in may - did a number of testing and it was acceptable    Pt was then sent to an allergist   Pt states the allergist did a number of labs  The following portions of the patient's history were reviewed and updated as appropriate: allergies, current medications, past family history, past medical history, past social history, past surgical history and problem list     Review of Systems   Constitutional: Negative  Negative for activity change, appetite change, chills, diaphoresis and fatigue  HENT: Negative  Negative for dental problem, ear pain, sinus pressure and sore throat  Eyes: Negative  Negative for photophobia, pain, discharge, redness, itching and visual disturbance  Respiratory: Negative for apnea and chest tightness  Cardiovascular: Negative  Negative for chest pain, palpitations and leg swelling  Gastrointestinal: Negative  Negative for abdominal distention, abdominal pain, constipation and diarrhea  Endocrine: Negative  Negative for cold intolerance and heat intolerance  Genitourinary: Negative  Negative for difficulty urinating and dyspareunia  Musculoskeletal: Negative  Negative for arthralgias and back pain  Skin: Negative  Allergic/Immunologic: Negative for environmental allergies  Neurological: Negative  Negative for dizziness  Psychiatric/Behavioral: Negative  Negative for agitation           Current Outpatient Medications   Medication Sig Dispense Refill    aspirin 81 MG tablet Take by mouth daily      Ferrous Sulfate (IRON) 325 (65 Fe) MG TABS Take by mouth daily      fluticasone (FLONASE) 50 mcg/act nasal spray USE 2 SPRAYS IN EACH NOSTRIL DAILY 48 g 1    GLUCOCARD VITAL TEST test strip TEST 5 TIMES DAILY 300 each 3    Glucosamine-Chondroitin 250-200 MG TABS Take by mouth daily       letrozole (FEMARA) 2 5 mg tablet Take 1 tablet (2 5 mg total) by mouth daily 90 tablet 2    mometasone (ELOCON) 0 1 % cream APPLY TO AFFECTED AREA EVERY DAY 45 g 0    Multiple Vitamins-Iron (QC DAILY MULTIVITAMINS/IRON) TABS Take by mouth daily      NAFTIN 2 % GEL APPLY TOPICALLY TWICE DAILY PRN  0    nebivolol (BYSTOLIC) 5 mg tablet Take 1 tablet (5 mg total) by mouth daily for 90 days 90 tablet 1    pravastatin (PRAVACHOL) 10 mg tablet Take 0 5 tablets (5 mg total) by mouth daily 90 tablet 1    Probiotic Product (PROBIOTIC DAILY) CAPS Take by mouth daily       sAXagliptin-metFORMIN ER (KOMBIGLYZE XR) 5-1000 MG TB24 Take 1 tablet by mouth daily 90 tablet 3    telmisartan (MICARDIS) 40 mg tablet Take 1 tablet (40 mg total) by mouth daily 90 tablet 1     No current facility-administered medications for this visit  pt has the labs from allergist, has an elevated compliment l;evel  Pt was placed on antyihistamine - seems like she did ok with antihistamine - no more hives, she is unsure what flares it up    Objective:    /82   Pulse 68   Temp 98 2 °F (36 8 °C)   Resp 16   Ht 5' 2 5" (1 588 m)   Wt 52 6 kg (116 lb)   BMI 20 88 kg/m²        Physical Exam   Constitutional: She appears well-developed and well-nourished  No distress  HENT:   Head: Normocephalic and atraumatic  Right Ear: External ear normal    Left Ear: External ear normal    Nose: Nose normal    Mouth/Throat: Oropharynx is clear and moist  No oropharyngeal exudate  Eyes: Pupils are equal, round, and reactive to light  EOM are normal  Right eye exhibits no discharge  Left eye exhibits no discharge  No scleral icterus  Neck: No thyromegaly present  Cardiovascular: Normal rate and normal heart sounds  Pulses are no weak pulses  No murmur heard  Pulses:       Dorsalis pedis pulses are 2+ on the right side, and 2+ on the left side  Posterior tibial pulses are 2+ on the right side, and 2+ on the left side  Pulmonary/Chest: Effort normal and breath sounds normal  No respiratory distress  She has no wheezes  Abdominal: Soft  Bowel sounds are normal  She exhibits no distension and no mass  There is no tenderness  There is no rebound and no guarding     Musculoskeletal: Normal range of motion  Feet:   Right Foot:   Skin Integrity: Negative for ulcer, skin breakdown, erythema, warmth, callus or dry skin  Left Foot:   Skin Integrity: Negative for ulcer, skin breakdown, erythema, warmth, callus or dry skin  Neurological: She is alert  She displays normal reflexes  Coordination normal    Skin: Skin is warm and dry  No rash noted  She is not diaphoretic  No erythema  Psychiatric: She has a normal mood and affect  Her behavior is normal    Nursing note and vitals reviewed  Diabetic Foot Exam    Patient's shoes and socks removed  Right Foot/Ankle   Right Foot Inspection  Skin Exam: skin normal skin not intact, no dry skin, no warmth, no callus, no erythema, no maceration, no abnormal color, no pre-ulcer, no ulcer and no callus                          Toe Exam: ROM and strength within normal limits  Sensory   Vibration: intact  Proprioception: intact   Monofilament testing: intact  Vascular  Capillary refills: < 3 seconds  The right DP pulse is 2+  The right PT pulse is 2+  Left Foot/Ankle  Left Foot Inspection  Skin Exam: skin normalskin not intact, no dry skin, no warmth, no erythema, no maceration, normal color, no pre-ulcer, no ulcer and no callus                         Toe Exam: ROM and strength within normal limits                   Sensory   Vibration: intact  Proprioception: intact  Monofilament: intact  Vascular  Capillary refills: < 3 seconds  The left DP pulse is 2+  The left PT pulse is 2+  Assign Risk Category:  No deformity present; No loss of protective sensation;  No weak pulses       Risk: 0    Recent Results (from the past 672 hour(s))   Comprehensive metabolic panel    Collection Time: 06/28/19  1:56 PM   Result Value Ref Range    Glucose, Random 103 65 - 139 mg/dL    BUN 21 7 - 25 mg/dL    Creatinine 1 00 0 50 - 1 05 mg/dL    eGFR Non  62 > OR = 60 mL/min/1 73m2    eGFR  72 > OR = 60 mL/min/1 73m2    SL AMB BUN/CREATININE RATIO NOT APPLICABLE 6 - 22 (calc)    Sodium 139 135 - 146 mmol/L    Potassium 3 8 3 5 - 5 3 mmol/L    Chloride 101 98 - 110 mmol/L    CO2 28 20 - 32 mmol/L    SL AMB CALCIUM 9 8 8 6 - 10 4 mg/dL    Protein, Total 7 0 6 1 - 8 1 g/dL    Albumin 4 3 3 6 - 5 1 g/dL    Globulin 2 7 1 9 - 3 7 g/dL (calc)    Albumin/Globulin Ratio 1 6 1 0 - 2 5 (calc)    TOTAL BILIRUBIN 0 4 0 2 - 1 2 mg/dL    Alkaline Phosphatase 74 33 - 130 U/L    AST 18 10 - 35 U/L    ALT 13 6 - 29 U/L   Cancer antigen 27 29    Collection Time: 06/28/19  1:56 PM   Result Value Ref Range    CA 27 29 20 <38 U/mL   CBC and differential    Collection Time: 06/28/19  1:56 PM   Result Value Ref Range    White Blood Cell Count 8 8 3 8 - 10 8 Thousand/uL    Red Blood Cell Count 4 70 3 80 - 5 10 Million/uL    Hemoglobin 12 9 11 7 - 15 5 g/dL    HCT 38 5 35 0 - 45 0 %    MCV 81 9 80 0 - 100 0 fL    MCH 27 4 27 0 - 33 0 pg    MCHC 33 5 32 0 - 36 0 g/dL    RDW 13 8 11 0 - 15 0 %    Platelet Count 628 508 - 400 Thousand/uL    SL AMB MPV 9 6 7 5 - 12 5 fL    Neutrophils (Absolute) 6,354 1,500 - 7,800 cells/uL    Lymphocytes (Absolute) 1,883 850 - 3,900 cells/uL    Monocytes (Absolute) 563 200 - 950 cells/uL    Eosinophils (Absolute) 0 (L) 15 - 500 cells/uL    Basophils ABS 0 0 - 200 cells/uL    Neutrophils 72 2 %    Lymphocytes 21 4 %    Monocytes 6 4 %    Eosinophils 0 0 %    Basophils PCT 0 0 %   Cancer antigen 15-3    Collection Time: 06/28/19  1:56 PM   Result Value Ref Range    CA 15-3 13 <32 U/mL       Collection Time: 06/28/19  1:56 PM   Result Value Ref Range    Cancer Antigen (CA) 125 4 <35 U/mL   Hemoglobin A1c (w/out EAG)    Collection Time: 06/28/19  2:02 PM   Result Value Ref Range    Hemoglobin A1C 6 1 (H) <5 7 % of total Hgb         Adolfo Oliveira, DO

## 2019-07-15 NOTE — PROGRESS NOTES
Saida Nelson  1960  [facility] [address] [phone]    DISCUSSIONS/SUMMARY:    18-year-old female with history of recurrent right breast cancer presently on Femara  Mrs Nelson (formally Thuy Edmonds) feels well and clinically there are no troubling signs or any signs of breast cancer recurrence  Recent laboratory tests were good/acceptable; CA 27, 29 and CA-15-3 as well as the  all within normal limits  The October 2018 mammogram was good/WNL  Patient is to continue with the self breast exams and yearly mammograms  Patient is to return in 6 months with repeat blood work before  DEXA scan has also been re-ordered previously patient refuse Prolia  Patient is on calcium and vitamin-D, continues to be active, working out at [employer] maintenance and medical care is up-to-date  Patient knows to call if she has any other oncology questions or concerns  Carefully review your medication list and verify that the list is accurate and up-to-date  Please call the oncology office if there are medications missing from the list, medications on the list that you are not currently taking or if there is a dosage or instruction that is different from how you're taking a medication  __________________________________________________________________________________________________    Chief Complaint   Patient presents with    Follow-up     Breast cancer     History of Present Illness:    63 female with a complicated past breast cancer history back for follow-up  Patient was first diagnosed in 2003 and underwent a right modified radical mastectomy in December 2003  Pathology results from [facility] in [address] [place] [address] demonstrated invasive ductal carcinoma, 0 5 cm with focal lymphovascular invasion  There was a second right breast mass also invasive, 4 0 by 2 5 cm, moderately differentiated  Patient also had focal DCIS, margins were free of tumor  Right axillary contents demonstrated 4/17 positive lymph nodes  Final stage was IIIA (pT2 pN2a M0)  Patient states that she refused adjuvant chemotherapy at that time  It is never been clear why patient was not offered adjuvant hormonal manipulation  Subsequently patient moved to [place] and in 2007 began to have pain in her right shoulder  Workup demonstrated 2 nodules within the shoulder muscle  One nodule was removed and demonstrated metastatic carcinoma  Patient underwent radiotherapy without complications and was placed on tamoxifen - completed 5 years (started in January 2008)  Patient subsequently began Femara and has completed 6+ years of Femara  Patient returns for follow-up and is accompanied by her   Mrs Nelson continues to feel well, baseline  Appetite is good, weight is stable  Activities are baseline  No pain control issues  No problems with the Femara specifically  No chest pain or pressure  No GI,  or GYN issues or bleeding  Hot flashes are minimal, same as before  Review of Systems   Constitutional: Negative  HENT: Negative  Eyes: Negative  Respiratory: Negative  Cardiovascular: Negative  Gastrointestinal: Negative  Endocrine: Negative  Genitourinary: Negative  Musculoskeletal: Negative  Skin: Negative  Allergic/Immunologic: Negative  Neurological: Negative  Hematological: Negative  Psychiatric/Behavioral: Negative  All other systems reviewed and are negative       Patient Active Problem List   Diagnosis    Adenocarcinoma of breast ([address] )    Seasonal allergic rhinitis due to pollen    Anemia    Benign essential hypertension    Breast cancer ([address] )    Mixed hyperlipidemia    Osteoporosis    Type 2 diabetes mellitus with both eyes affected by mild nonproliferative retinopathy without macular edema, without long-term current use of insulin ([address] )    Hives     Past Medical History:   Diagnosis Date    Abnormal liver function test     Acute upper respiratory infection 04/17/2008    Breast cancer ([facility] )     Cough 08/07/2008    Diabetes mellitus ([facility] )     History of acute sinusitis 09/28/2010    History of diarrhea     History of hypertension     History of motion sickness 08/07/2008    History of orthopnea 01/10/2008     Past Surgical History:   Procedure Laterality Date    BREAST LUMPECTOMY      MASTECTOMY      TOOTH EXTRACTION       Family History   Problem Relation Age of Onset    Hypertension Father     Diabetes Family     Heart disease Family     Parkinsonism Mother     Dementia Mother     Mental illness Neg Hx      Social History     Socioeconomic History    Marital status: /Civil Union     Spouse name: Not on file    Number of children: Not on file    Years of education: Not on file    Highest education level: Not on file   Occupational History    Not on file   Social Needs    Financial resource strain: Not on file    Food insecurity:     Worry: Not on file     Inability: Not on file    Transportation needs:     Medical: Not on file     Non-medical: Not on file   Tobacco Use    Smoking status: Never Smoker    Smokeless tobacco: Never Used   Substance and Sexual Activity    Alcohol use: Yes     Comment: social drinker    Drug use: Not on file    Sexual activity: Not on file   Lifestyle    Physical activity:     Days per week: Not on file     Minutes per session: Not on file    Stress: Not on file   Relationships    Social connections:     Talks on phone: Not on file     Gets together: Not on file     Attends Anglican service: Not on file     Active member of club or organization: Not on file     Attends meetings of clubs or organizations: Not on file     Relationship status: Not on file    Intimate partner violence:     Fear of current or ex partner: Not on file     Emotionally abused: Not on file     Physically abused: Not on file Forced sexual activity: Not on file   Other Topics Concern    Not on file   Social History Narrative    Not on file       Current Outpatient Medications:     aspirin 81 MG tablet, Take by mouth daily, Disp: , Rfl:     Ferrous Sulfate (IRON) 325 (65 Fe) MG TABS, Take by mouth daily, Disp: , Rfl:     fluticasone (FLONASE) 50 mcg/act nasal spray, USE 2 SPRAYS IN EACH NOSTRIL DAILY, Disp: 48 g, Rfl: 1    GLUCOCARD VITAL TEST test strip, TEST 5 TIMES DAILY, Disp: 300 each, Rfl: 3    Glucosamine-Chondroitin 250-200 MG TABS, Take by mouth daily , Disp: , Rfl:     letrozole (FEMARA) 2 5 mg tablet, Take 1 tablet (2 5 mg total) by mouth daily, Disp: 90 tablet, Rfl: 2    mometasone (ELOCON) 0 1 % cream, APPLY TO AFFECTED AREA EVERY DAY, Disp: 45 g, Rfl: 0    Multiple Vitamins-Iron (QC DAILY MULTIVITAMINS/IRON) TABS, Take by mouth daily, Disp: , Rfl:     NAFTIN 2 % GEL, APPLY TOPICALLY TWICE DAILY PRN, Disp: , Rfl: 0    nebivolol (BYSTOLIC) 5 mg tablet, Take 1 tablet (5 mg total) by mouth daily for 90 days, Disp: 90 tablet, Rfl: 1    pravastatin (PRAVACHOL) 10 mg tablet, Take 0 5 tablets (5 mg total) by mouth daily, Disp: 90 tablet, Rfl: 1    Probiotic Product (PROBIOTIC DAILY) CAPS, Take by mouth daily , Disp: , Rfl:     sAXagliptin-metFORMIN ER (KOMBIGLYZE XR) 5-1000 MG TB24, Take 1 tablet by mouth daily, Disp: 90 tablet, Rfl: 3    telmisartan (MICARDIS) 40 mg tablet, Take 1 tablet (40 mg total) by mouth daily, Disp: 90 tablet, Rfl: 1     Allergies   Allergen Reactions    Erythromycin Swelling     Reaction Date: 23Aug2007;     Penicillins Hives     Reaction Date: 23Aug2007;     Codeine Nausea Only     Reaction Date: 23Aug2007;     Sulfate Rash and Fever     Reaction Date: 23Aug2007;      Vitals:    07/15/19 1308   BP: 122/72   Pulse: 73   Resp: 18   Temp: 99 7 °F (37 6 °C)   SpO2: 99%     Physical Exam   Constitutional: She is oriented to person, place, and time   She appears well-developed and well-nourished  Well-nourished female, no respiratory distress   HENT:   Head: Normocephalic and atraumatic  Right Ear: External ear normal    Left Ear: External ear normal    Nose: Nose normal    Mouth/Throat: Oropharynx is clear and moist    Eyes: Pupils are equal, round, and reactive to light  Conjunctivae and EOM are normal    Neck: Normal range of motion  Neck supple  Supple, no JVD   Cardiovascular: Normal rate, regular rhythm, normal heart sounds and intact distal pulses  Pulmonary/Chest: Effort normal and breath sounds normal    Good air entry bilaterally, clear   Abdominal: Soft  Bowel sounds are normal    Soft, nontender, +bowel sounds, no hepatosplenomegaly, no rigidity or rebound   Musculoskeletal: Normal range of motion  Neurological: She is alert and oriented to person, place, and time  She has normal reflexes  Skin: Skin is warm  Warm, moist, good color, no petechiae or ecchymoses   Psychiatric: She has a normal mood and affect   Her behavior is normal  Judgment and thought content normal    Breasts (female present) right anterior chest wall with well-healed suture line, scars look to be the same as before, no nodules or redness, radiation tattoos to visible, left breast without masses, retraction, redness or dimpling, no axillary adenopathy bilaterally  Extremities:  No lower extremity edema, no cords, pulses are 1+  Lymphatics:   No adenopathy in the neck, supraclavicular region, axilla and groin bilaterally    Performance Status: ECOG = 0    Labs    06/28/2019 WBC = 8 8 hemoglobin = 12 9 hematocrit = 39 platelet = 828 neutrophil = 72% BUN = 21 creatinine = 1 00 CA 27, 29 = 20 CA 15-3 = 13  = 4    02/23/2019 WBC = 7 9 hemoglobin = 12 1 hematocrit = 36 7 MCV = 80 RDW = 14 3 platelet = 223 neutrophil = 74% lymphocytes = 18% monocyte = 8% BUN = 12 creatinine = 1 04 calcium = 9 5 AST = 16 ALT = 10 alkaline phosphatase = 75 total bilirubin = 0 5 CA 27, 29 = 26 CA 15-3 = 16  = 4    10/20/2018 WBC = 6 6 hemoglobin = 12 3 hematocrit = 36 7 platelet = 213 neutrophil = 68% BUN = 22 creatinine = 1 08 LFTs WNL CA 15-3 = 16  = 6 CA 27, 29 = 24   5/19/18 Ca 27,29 = 21 Ca 15-3 = 17 Ca-125 = 5 LFTs WNL  H/H = 13 1/39 8  1/20/18 CA 27, 29 = 24     CA 15-3 = 14      = 5  8/26/17  = 6  5/19/17 CA 15-3 = 17 CA 27, 29 = 35  = 5   2/11/17 CA-125 = 7 CA 15-3 = 28 CA 27, 29 = 59  10/1/16  CA 15-3 = 15 CA 27, 29 = 40  = 7  5/21/16 6 CA 15-3 = 23 CA-125 = 7 CA 27, 29 = 41  2/20/16  Ca-125 = 6 CA 27, 29 = 47 LFTs WNL calcium = 9 7    Imaging    10/29/2018 diagnostic mammogram impression stated no mammographic evidence of malignancy of left breast, category 2 (right-sided mastectomy)  10/13/17 diagnostic left mammogram = category 1, negative  10/10/16 left unilateral digital mammogram with CAD was read as category 1, negative  3/3/17 DEXA scan demonstrated osteopenia  8/7/15 left unilateral digital mammogram was category 1, negative  3/12/15 DEXA scan demonstrated WHO classification osteopenia  8/1/14 left digital diagnostic mammogram was category 1, negative  Pathology    6/25/15 BRCA1/2 sequencing and deletion/duplication analysis = negative

## 2019-07-15 NOTE — ASSESSMENT & PLAN NOTE
Lab Results   Component Value Date    HGBA1C 6 1 (H) 06/28/2019       No results for input(s): POCGLU in the last 72 hours      Blood Sugar Average: Last 72 hrs:

## 2019-07-16 DIAGNOSIS — C50.911 ADENOCARCINOMA OF RIGHT BREAST (HCC): Primary | ICD-10-CM

## 2019-08-13 DIAGNOSIS — I10 BENIGN ESSENTIAL HYPERTENSION: Primary | ICD-10-CM

## 2019-08-13 PROCEDURE — 4010F ACE/ARB THERAPY RXD/TAKEN: CPT | Performed by: FAMILY MEDICINE

## 2019-08-13 RX ORDER — TELMISARTAN 80 MG/1
TABLET ORAL
Qty: 90 TABLET | Refills: 1 | Status: SHIPPED | OUTPATIENT
Start: 2019-08-13 | End: 2019-12-31 | Stop reason: SDUPTHER

## 2019-10-11 ENCOUNTER — TELEPHONE (OUTPATIENT)
Dept: FAMILY MEDICINE CLINIC | Facility: CLINIC | Age: 59
End: 2019-10-11

## 2019-10-11 NOTE — TELEPHONE ENCOUNTER
Received fax from [facility], pt needs rx for accu-check fastclix lancets and accucheck guide test strips  [facility]

## 2019-11-17 DIAGNOSIS — I10 BENIGN ESSENTIAL HYPERTENSION: ICD-10-CM

## 2019-11-17 DIAGNOSIS — J30.1 SEASONAL ALLERGIC RHINITIS DUE TO POLLEN: ICD-10-CM

## 2019-11-18 DIAGNOSIS — C50.919 MALIGNANT NEOPLASM OF BREAST IN FEMALE, ESTROGEN RECEPTOR POSITIVE, UNSPECIFIED LATERALITY, UNSPECIFIED SITE OF BREAST (HCC): ICD-10-CM

## 2019-11-18 DIAGNOSIS — Z17.0 MALIGNANT NEOPLASM OF BREAST IN FEMALE, ESTROGEN RECEPTOR POSITIVE, UNSPECIFIED LATERALITY, UNSPECIFIED SITE OF BREAST (HCC): ICD-10-CM

## 2019-11-18 RX ORDER — NEBIVOLOL HYDROCHLORIDE 5 MG/1
TABLET ORAL
Qty: 90 TABLET | Refills: 0 | Status: SHIPPED | OUTPATIENT
Start: 2019-11-18 | End: 2019-12-31 | Stop reason: SDUPTHER

## 2019-11-18 RX ORDER — FLUTICASONE PROPIONATE 50 MCG
SPRAY, SUSPENSION (ML) NASAL
Qty: 48 G | Refills: 0 | Status: SHIPPED | OUTPATIENT
Start: 2019-11-18 | End: 2020-02-05

## 2019-11-18 RX ORDER — LETROZOLE 2.5 MG/1
2.5 TABLET, FILM COATED ORAL DAILY
Qty: 90 TABLET | Refills: 0 | Status: SHIPPED | OUTPATIENT
Start: 2019-11-18 | End: 2019-12-31 | Stop reason: SDUPTHER

## 2019-12-21 DIAGNOSIS — C50.911 ADENOCARCINOMA OF RIGHT BREAST (HCC): ICD-10-CM

## 2019-12-25 LAB
CHOLEST SERPL-MCNC: 191 MG/DL
CHOLEST/HDLC SERPL: 3.5 (CALC)
HBA1C MFR BLD: 6 % OF TOTAL HGB
HDLC SERPL-MCNC: 54 MG/DL
LDLC SERPL CALC-MCNC: 116 MG/DL (CALC)
NONHDLC SERPL-MCNC: 137 MG/DL (CALC)
TRIGL SERPL-MCNC: 104 MG/DL

## 2019-12-27 LAB
ALBUMIN SERPL-MCNC: 4.5 G/DL (ref 3.6–5.1)
ALBUMIN/GLOB SERPL: 1.7 (CALC) (ref 1–2.5)
ALP SERPL-CCNC: 73 U/L (ref 33–130)
ALT SERPL-CCNC: 16 U/L (ref 6–29)
AST SERPL-CCNC: 19 U/L (ref 10–35)
BASOPHILS # BLD AUTO: 0 CELLS/UL (ref 0–200)
BASOPHILS NFR BLD AUTO: 0 %
BILIRUB SERPL-MCNC: 0.5 MG/DL (ref 0.2–1.2)
BUN SERPL-MCNC: 18 MG/DL (ref 7–25)
BUN/CREAT SERPL: ABNORMAL (CALC) (ref 6–22)
CALCIUM SERPL-MCNC: 9.6 MG/DL (ref 8.6–10.4)
CANCER AG125 SERPL-ACNC: 5 U/ML
CANCER AG15-3 SERPL-ACNC: 15 U/ML
CANCER AG27-29 SERPL-ACNC: 26 U/ML
CHLORIDE SERPL-SCNC: 100 MMOL/L (ref 98–110)
CO2 SERPL-SCNC: 28 MMOL/L (ref 20–32)
CREAT SERPL-MCNC: 0.98 MG/DL (ref 0.5–1.05)
EOSINOPHIL # BLD AUTO: 0 CELLS/UL (ref 15–500)
EOSINOPHIL NFR BLD AUTO: 0 %
ERYTHROCYTE [DISTWIDTH] IN BLOOD BY AUTOMATED COUNT: 13.7 % (ref 11–15)
GLOBULIN SER CALC-MCNC: 2.7 G/DL (CALC) (ref 1.9–3.7)
GLUCOSE SERPL-MCNC: 116 MG/DL (ref 65–99)
HCT VFR BLD AUTO: 41.6 % (ref 35–45)
HGB BLD-MCNC: 13.6 G/DL (ref 11.7–15.5)
LYMPHOCYTES # BLD AUTO: 1382 CELLS/UL (ref 850–3900)
LYMPHOCYTES NFR BLD AUTO: 19.2 %
MCH RBC QN AUTO: 27 PG (ref 27–33)
MCHC RBC AUTO-ENTMCNC: 32.7 G/DL (ref 32–36)
MCV RBC AUTO: 82.7 FL (ref 80–100)
MONOCYTES # BLD AUTO: 490 CELLS/UL (ref 200–950)
MONOCYTES NFR BLD AUTO: 6.8 %
NEUTROPHILS # BLD AUTO: 5328 CELLS/UL (ref 1500–7800)
NEUTROPHILS NFR BLD AUTO: 74 %
PLATELET # BLD AUTO: 223 THOUSAND/UL (ref 140–400)
PMV BLD REES-ECKER: 10 FL (ref 7.5–12.5)
POTASSIUM SERPL-SCNC: 4.3 MMOL/L (ref 3.5–5.3)
PROT SERPL-MCNC: 7.2 G/DL (ref 6.1–8.1)
RBC # BLD AUTO: 5.03 MILLION/UL (ref 3.8–5.1)
SL AMB EGFR AFRICAN AMERICAN: 74 ML/MIN/1.73M2
SL AMB EGFR NON AFRICAN AMERICAN: 64 ML/MIN/1.73M2
SODIUM SERPL-SCNC: 137 MMOL/L (ref 135–146)
WBC # BLD AUTO: 7.2 THOUSAND/UL (ref 3.8–10.8)

## 2019-12-31 ENCOUNTER — OFFICE VISIT (OUTPATIENT)
Dept: FAMILY MEDICINE CLINIC | Facility: CLINIC | Age: 59
End: 2019-12-31
Payer: COMMERCIAL

## 2019-12-31 ENCOUNTER — OFFICE VISIT (OUTPATIENT)
Dept: HEMATOLOGY ONCOLOGY | Facility: MEDICAL CENTER | Age: 59
End: 2019-12-31
Payer: COMMERCIAL

## 2019-12-31 VITALS
HEIGHT: 63 IN | OXYGEN SATURATION: 98 % | BODY MASS INDEX: 21.09 KG/M2 | DIASTOLIC BLOOD PRESSURE: 70 MMHG | HEART RATE: 68 BPM | SYSTOLIC BLOOD PRESSURE: 132 MMHG | RESPIRATION RATE: 16 BRPM | WEIGHT: 119 LBS | TEMPERATURE: 98.3 F

## 2019-12-31 VITALS
HEART RATE: 80 BPM | OXYGEN SATURATION: 99 % | WEIGHT: 120 LBS | HEIGHT: 63 IN | DIASTOLIC BLOOD PRESSURE: 72 MMHG | TEMPERATURE: 97.2 F | RESPIRATION RATE: 18 BRPM | BODY MASS INDEX: 21.26 KG/M2 | SYSTOLIC BLOOD PRESSURE: 124 MMHG

## 2019-12-31 DIAGNOSIS — M81.0 OSTEOPOROSIS WITHOUT CURRENT PATHOLOGICAL FRACTURE, UNSPECIFIED OSTEOPOROSIS TYPE: ICD-10-CM

## 2019-12-31 DIAGNOSIS — Z11.4 SCREENING FOR HIV (HUMAN IMMUNODEFICIENCY VIRUS): ICD-10-CM

## 2019-12-31 DIAGNOSIS — Z17.0 MALIGNANT NEOPLASM OF BREAST IN FEMALE, ESTROGEN RECEPTOR POSITIVE, UNSPECIFIED LATERALITY, UNSPECIFIED SITE OF BREAST (HCC): ICD-10-CM

## 2019-12-31 DIAGNOSIS — E11.3293 TYPE 2 DIABETES MELLITUS WITH BOTH EYES AFFECTED BY MILD NONPROLIFERATIVE RETINOPATHY WITHOUT MACULAR EDEMA, WITHOUT LONG-TERM CURRENT USE OF INSULIN (HCC): ICD-10-CM

## 2019-12-31 DIAGNOSIS — C50.911 ADENOCARCINOMA OF RIGHT BREAST (HCC): Primary | ICD-10-CM

## 2019-12-31 DIAGNOSIS — C50.919 MALIGNANT NEOPLASM OF BREAST IN FEMALE, ESTROGEN RECEPTOR POSITIVE, UNSPECIFIED LATERALITY, UNSPECIFIED SITE OF BREAST (HCC): ICD-10-CM

## 2019-12-31 DIAGNOSIS — E78.2 MIXED HYPERLIPIDEMIA: ICD-10-CM

## 2019-12-31 DIAGNOSIS — I10 BENIGN ESSENTIAL HYPERTENSION: ICD-10-CM

## 2019-12-31 DIAGNOSIS — C50.919 MALIGNANT NEOPLASM OF FEMALE BREAST, UNSPECIFIED ESTROGEN RECEPTOR STATUS, UNSPECIFIED LATERALITY, UNSPECIFIED SITE OF BREAST (HCC): ICD-10-CM

## 2019-12-31 PROCEDURE — 99213 OFFICE O/P EST LOW 20 MIN: CPT | Performed by: INTERNAL MEDICINE

## 2019-12-31 PROCEDURE — 99214 OFFICE O/P EST MOD 30 MIN: CPT | Performed by: FAMILY MEDICINE

## 2019-12-31 PROCEDURE — 3078F DIAST BP <80 MM HG: CPT | Performed by: FAMILY MEDICINE

## 2019-12-31 PROCEDURE — 3008F BODY MASS INDEX DOCD: CPT | Performed by: FAMILY MEDICINE

## 2019-12-31 PROCEDURE — 1036F TOBACCO NON-USER: CPT | Performed by: FAMILY MEDICINE

## 2019-12-31 PROCEDURE — 3075F SYST BP GE 130 - 139MM HG: CPT | Performed by: FAMILY MEDICINE

## 2019-12-31 PROCEDURE — 4010F ACE/ARB THERAPY RXD/TAKEN: CPT | Performed by: FAMILY MEDICINE

## 2019-12-31 RX ORDER — PRAVASTATIN SODIUM 10 MG
10 TABLET ORAL DAILY
Qty: 90 TABLET | Refills: 1 | Status: SHIPPED | OUTPATIENT
Start: 2019-12-31 | End: 2020-04-24

## 2019-12-31 RX ORDER — TELMISARTAN 40 MG/1
40 TABLET ORAL DAILY
Qty: 90 TABLET | Refills: 1 | Status: SHIPPED | OUTPATIENT
Start: 2019-12-31 | End: 2020-04-24

## 2019-12-31 RX ORDER — LETROZOLE 2.5 MG/1
2.5 TABLET, FILM COATED ORAL DAILY
Qty: 90 TABLET | Refills: 0 | Status: SHIPPED | OUTPATIENT
Start: 2019-12-31 | End: 2020-04-27

## 2019-12-31 RX ORDER — CHOLECALCIFEROL (VITAMIN D3) 1MM UNIT/G
LIQUID (GRAM) MISCELLANEOUS
COMMUNITY
End: 2022-06-27

## 2019-12-31 RX ORDER — NEBIVOLOL 5 MG/1
5 TABLET ORAL DAILY
Qty: 90 TABLET | Refills: 1 | Status: SHIPPED | OUTPATIENT
Start: 2019-12-31 | End: 2020-02-05

## 2019-12-31 NOTE — PROGRESS NOTES
Saida Nelson  1960  [address] [facility] [address] [phone]    DISCUSSIONS/SUMMARY:    69-year-old female with history of recurrent right breast cancer presently on Femara  Mrs Nelson (formally Gabo Goodell) feels well and clinically there are no troubling signs or any signs of breast cancer recurrence  Recent laboratory tests were good/acceptable; CA 27, 29 and CA-15-3 as well as the  all within normal limits  The October 2019 mammogram was good/WNL  Patient is to continue with the self breast exams and yearly mammograms  Patient is to return in 6 months with repeat blood work before  Recent DEXA scan demonstrated osteopenia, same as before  Patient is on calcium and vitamin-D and continues to workout at her gym  Previously Mrs Nelson refused bisphosphonates  Routine health maintenance and medical care is up-to-date  Patient knows to call if she has any other oncology questions or concerns  Carefully review your medication list and verify that the list is accurate and up-to-date  Please call the oncology office if there are medications missing from the list, medications on the list that you are not currently taking or if there is a dosage or instruction that is different from how you're taking a medication  __________________________________________________________________________________________________    Chief Complaint   Patient presents with    Follow-up     Recurrent breast cancer, on Femara     History of Present Illness:    63 female with a complicated past breast cancer history back for follow-up  Patient was first diagnosed in 2003 and underwent a right modified radical mastectomy in December 2003  Pathology results from [facility] in [address] [place] [address] demonstrated invasive ductal carcinoma, 0 5 cm with focal lymphovascular invasion   There was a second right breast mass also invasive, 4 0 by 2 5 cm, moderately differentiated  Patient also had focal DCIS, margins were free of tumor  Right axillary contents demonstrated 4/17 positive lymph nodes  Final stage was IIIA (pT2 pN2a M0)  Patient states that she refused adjuvant chemotherapy at that time  It is never been clear why patient was not offered adjuvant hormonal manipulation  Subsequently patient moved to [place] and in 2007 began to have pain in her right shoulder  Workup demonstrated 2 nodules within the shoulder muscle  One nodule was removed and demonstrated metastatic carcinoma  Patient underwent radiotherapy without complications and was placed on tamoxifen - completed 5 years (started in January 2008)  Patient subsequently began Femara and has completed 6+ years of Femara  Patient returns for follow-up and is accompanied by her   Mrs Nelson states feeling quite well  No pain control issues  Appetite is good, weight is stable  Diabetes under control  No specific problems with the Femara  No other breast related issues  No GI,  or gyn issues  Hot flashes are minimal, same as before  Activities are baseline  Review of Systems   Constitutional: Negative  HENT: Negative  Eyes: Negative  Respiratory: Negative  Cardiovascular: Negative  Gastrointestinal: Negative  Endocrine: Negative  Genitourinary: Negative  Musculoskeletal: Negative  Skin: Negative  Allergic/Immunologic: Negative  Neurological: Negative  Hematological: Negative  Psychiatric/Behavioral: Negative  All other systems reviewed and are negative       Patient Active Problem List   Diagnosis    Adenocarcinoma of breast ([address] )    Seasonal allergic rhinitis due to pollen    Anemia    Benign essential hypertension    Breast cancer ([address] )    Mixed hyperlipidemia    Osteoporosis    Type 2 diabetes mellitus with both eyes affected by mild nonproliferative retinopathy without macular edema, without long-term current use of insulin ([facility] )    Hives     Past Medical History:   Diagnosis Date    Abnormal liver function test     Acute upper respiratory infection 04/17/2008    Breast cancer ([facility] )     Cough 08/07/2008    Diabetes mellitus ([place] [address] )     History of acute sinusitis 09/28/2010    History of diarrhea     History of hypertension     History of motion sickness 08/07/2008    History of orthopnea 01/10/2008     Past Surgical History:   Procedure Laterality Date    BREAST LUMPECTOMY      MASTECTOMY      TOOTH EXTRACTION       Family History   Problem Relation Age of Onset    Hypertension Father     Diabetes Family     Heart disease Family     Parkinsonism Mother     Dementia Mother     Mental illness Neg Hx      Social History     Socioeconomic History    Marital status: /Civil Union     Spouse name: Not on file    Number of children: Not on file    Years of education: Not on file    Highest education level: Not on file   Occupational History    Not on file   Social Needs    Financial resource strain: Not on file    Food insecurity:     Worry: Not on file     Inability: Not on file    Transportation needs:     Medical: Not on file     Non-medical: Not on file   Tobacco Use    Smoking status: Never Smoker    Smokeless tobacco: Never Used   Substance and Sexual Activity    Alcohol use: Yes     Comment: social drinker    Drug use: Not on file    Sexual activity: Not on file   Lifestyle    Physical activity:     Days per week: Not on file     Minutes per session: Not on file    Stress: Not on file   Relationships    Social connections:     Talks on phone: Not on file     Gets together: Not on file     Attends Taoism service: Not on file     Active member of club or organization: Not on file     Attends meetings of clubs or organizations: Not on file     Relationship status: Not on file    Intimate partner violence:     Fear of current or ex partner: Not on file     Emotionally abused: Not on file     Physically abused: Not on file     Forced sexual activity: Not on file   Other Topics Concern    Not on file   Social History Narrative    Not on file       Current Outpatient Medications:     aspirin 81 MG tablet, Take by mouth daily, Disp: , Rfl:     BYSTOLIC 5 MG tablet, TAKE 1 TABLET DAILY, Disp: 90 tablet, Rfl: 0    Cholecalciferol (VITAMIN D3) LIQD, , Disp: , Rfl:     Ferrous Sulfate (IRON) 325 (65 Fe) MG TABS, Take by mouth daily, Disp: , Rfl:     fluticasone (FLONASE) 50 mcg/act nasal spray, USE 2 SPRAYS IN EACH       NOSTRIL DAILY, Disp: 48 g, Rfl: 0    GLUCOCARD VITAL TEST test strip, TEST 5 TIMES DAILY, Disp: 300 each, Rfl: 3    Glucosamine-Chondroitin 250-200 MG TABS, Take by mouth daily , Disp: , Rfl:     letrozole (FEMARA) 2 5 mg tablet, Take 1 tablet (2 5 mg total) by mouth daily, Disp: 90 tablet, Rfl: 0    mometasone (ELOCON) 0 1 % cream, APPLY TO AFFECTED AREA EVERY DAY, Disp: 45 g, Rfl: 0    NAFTIN 2 % GEL, APPLY TOPICALLY TWICE DAILY PRN, Disp: , Rfl: 0    pravastatin (PRAVACHOL) 10 mg tablet, Take 0 5 tablets (5 mg total) by mouth daily, Disp: 90 tablet, Rfl: 1    Probiotic Product (PROBIOTIC DAILY) CAPS, Take by mouth daily , Disp: , Rfl:     sAXagliptin-metFORMIN ER (KOMBIGLYZE XR) 5-1000 MG TB24, Take 1 tablet by mouth daily, Disp: 90 tablet, Rfl: 3    telmisartan (MICARDIS) 40 mg tablet, Take 1 tablet (40 mg total) by mouth daily, Disp: 90 tablet, Rfl: 1    Multiple Vitamins-Iron (QC DAILY MULTIVITAMINS/IRON) TABS, Take by mouth daily, Disp: , Rfl:     telmisartan (MICARDIS) 80 MG tablet, TAKE 1 TABLET BY MOUTH EVERY DAY (Patient not taking: Reported on 12/31/2019), Disp: 90 tablet, Rfl: 1     Allergies   Allergen Reactions    Erythromycin Swelling     Reaction Date: 23Aug2007;     Penicillins Hives     Reaction Date: 23Aug2007;     Codeine Nausea Only     Reaction Date: 23Aug2007;     Sulfamethoxazole-Trimethoprim Hives    Sulfate Rash and Fever Reaction Date: 23Aug2007;      Vitals:    12/31/19 0802   BP: 124/72   Pulse: 80   Resp: 18   Temp: (!) 97 2 °F (36 2 °C)   SpO2: 99%     Physical Exam   Constitutional: She is oriented to person, place, and time  She appears well-developed and well-nourished  Well-nourished female, no respiratory distress   HENT:   Head: Normocephalic and atraumatic  Right Ear: External ear normal    Left Ear: External ear normal    Nose: Nose normal    Mouth/Throat: Oropharynx is clear and moist    Eyes: Pupils are equal, round, and reactive to light  Conjunctivae and EOM are normal    Neck: Normal range of motion  Neck supple  Supple, no JVD   Cardiovascular: Normal rate, regular rhythm, normal heart sounds and intact distal pulses  Pulmonary/Chest: Effort normal and breath sounds normal    Good air entry bilaterally, clear   Abdominal: Soft  Bowel sounds are normal    Soft, nontender, +bowel sounds, no hepatosplenomegaly, no rigidity or rebound   Musculoskeletal: Normal range of motion  Neurological: She is alert and oriented to person, place, and time  She has normal reflexes  Skin: Skin is warm  Warm, moist, good color, no petechiae or ecchymoses   Psychiatric: She has a normal mood and affect   Her behavior is normal  Judgment and thought content normal    Breasts:  Deferred, no axillary adenopathy bilaterally  Extremities:  No lower extremity edema bilaterally, no cords, pulses are 1+  Lymphatics:   No adenopathy in the neck, supraclavicular region, axilla and groin bilaterally    Performance Status: ECOG = 0    Labs    12/24/2019 WBC = 7 2 hemoglobin = 13 6 hematocrit = 42 platelet = 736 neutrophil = 74% BUN = 18 creatinine = 0 98 LFTs WNL calcium = 9 6 CA 27, 29 = 26 CA 15-3 = 15  = 5    06/28/2019 WBC = 8 8 hemoglobin = 12 9 hematocrit = 39 platelet = 292 neutrophil = 72% BUN = 21 creatinine = 1 00 CA 27, 29 = 20 CA 15-3 = 13  = 4    02/23/2019 WBC = 7 9 hemoglobin = 12 1 hematocrit = 36 7 MCV = 80 RDW = 14 3 platelet = 688 neutrophil = 74% lymphocytes = 18% monocyte = 8% BUN = 12 creatinine = 1 04 calcium = 9 5 AST = 16 ALT = 10 alkaline phosphatase = 75 total bilirubin = 0 5 CA 27, 29 = 26 CA 15-3 = 16  = 4    10/20/2018 WBC = 6 6 hemoglobin = 12 3 hematocrit = 36 7 platelet = 401 neutrophil = 68% BUN = 22 creatinine = 1 08 LFTs WNL CA 15-3 = 16  = 6 CA 27, 29 = 24   5/19/18 Ca 27,29 = 21 Ca 15-3 = 17 Ca-125 = 5 LFTs WNL  H/H = 13 1/39 8  1/20/18 CA 27, 29 = 24     CA 15-3 = 14      = 5  8/26/17  = 6  5/19/17 CA 15-3 = 17 CA 27, 29 = 35  = 5   2/11/17 CA-125 = 7 CA 15-3 = 28 CA 27, 29 = 59  10/1/16  CA 15-3 = 15 CA 27, 29 = 40  = 7  5/21/16 6 CA 15-3 = 23 CA-125 = 7 CA 27, 29 = 41  2/20/16  Ca-125 = 6 CA 27, 29 = 47 LFTs WNL calcium = 9 7    Imaging    12/20/2019 DEXA scan impression stated that the lumbar measurements till equals osteopenia, fracture risk is elevated, follow-up in 1 or 2 years should be considered  11/08/2019 diagnostic left mammogram impression stated no exams for malignancy  10/29/2018 diagnostic mammogram impression stated no mammographic evidence of malignancy of left breast, category 2 (right-sided mastectomy)  10/13/17 diagnostic left mammogram = category 1, negative  10/10/16 left unilateral digital mammogram with CAD was read as category 1, negative  3/3/17 DEXA scan demonstrated osteopenia  8/7/15 left unilateral digital mammogram was category 1, negative  3/12/15 DEXA scan demonstrated WHO classification osteopenia  8/1/14 left digital diagnostic mammogram was category 1, negative  Pathology    6/25/15 BRCA1/2 sequencing and deletion/duplication analysis = negative

## 2019-12-31 NOTE — PROGRESS NOTES
Assessment/Plan:    1  Adenocarcinoma of right breast ([facility] )    2  Type 2 diabetes mellitus with both eyes affected by mild nonproliferative retinopathy without macular edema, without long-term current use of insulin (HCC)  -     sAXagliptin-metFORMIN ER (KOMBIGLYZE XR) 5-1000 MG TB24; Take 1 tablet by mouth daily  -     Hemoglobin A1C; Future; Expected date: 06/13/2020  -     Microalbumin / creatinine urine ratio; Future; Expected date: 06/13/2020    3  Benign essential hypertension  -     nebivolol (BYSTOLIC) 5 mg tablet; Take 1 tablet (5 mg total) by mouth daily  -     telmisartan (MICARDIS) 40 mg tablet; Take 1 tablet (40 mg total) by mouth daily    4  Mixed hyperlipidemia  -     pravastatin (PRAVACHOL) 10 mg tablet; Take 1 tablet (10 mg total) by mouth daily  -     Lipid Panel with Direct LDL reflex; Future; Expected date: 06/13/2020    5  Malignant neoplasm of female breast, unspecified estrogen receptor status, unspecified laterality, unspecified site of breast ([place] [address] )    6  Osteoporosis without current pathological fracture, unspecified osteoporosis type    7  Screening for HIV (human immunodeficiency virus)  -     Human Immunodeficiency Virus 1/2 Antigen / Antibody ( Fourth Generation) with Reflex Testing; Future          Pt does not want to do follow up in 4 months wants to follow the DM in 6 months to coincide with her oncology follow up    There are no Patient Instructions on file for this visit  Return in about 6 months (around 6/30/2020) for Recheck DM  Subjective:      Patient ID: Brigida Calabrese is a 63 y o  female      Chief Complaint   Patient presents with    Follow-up     Britt Rodriguez       Pt is here to follow up DM  Pt is doing well no chest pain no SOB  No new issues today    Pt has an appt with eye doc in feb/march      The following portions of the patient's history were reviewed and updated as appropriate: allergies, current medications, past family history, past medical history, past social history, past surgical history and problem list     Review of Systems   Constitutional: Negative  Negative for activity change, appetite change, chills, diaphoresis and fatigue  HENT: Negative  Negative for dental problem, ear pain, sinus pressure and sore throat  Eyes: Negative  Negative for photophobia, pain, discharge, redness, itching and visual disturbance  Respiratory: Negative for apnea and chest tightness  Cardiovascular: Negative  Negative for chest pain, palpitations and leg swelling  Gastrointestinal: Negative  Negative for abdominal distention, abdominal pain, constipation and diarrhea  Endocrine: Negative  Negative for cold intolerance and heat intolerance  Genitourinary: Negative  Negative for difficulty urinating and dyspareunia  Musculoskeletal: Negative  Negative for arthralgias and back pain  Skin: Negative  Allergic/Immunologic: Negative for environmental allergies  Neurological: Negative  Negative for dizziness  Psychiatric/Behavioral: Negative  Negative for agitation           Current Outpatient Medications   Medication Sig Dispense Refill    aspirin 81 MG tablet Take by mouth daily      Cholecalciferol (VITAMIN D3) LIQD       Ferrous Sulfate (IRON) 325 (65 Fe) MG TABS Take by mouth daily      fluticasone (FLONASE) 50 mcg/act nasal spray USE 2 SPRAYS IN EACH       NOSTRIL DAILY 48 g 0    GLUCOCARD VITAL TEST test strip TEST 5 TIMES DAILY 300 each 3    Glucosamine-Chondroitin 250-200 MG TABS Take by mouth daily       letrozole (FEMARA) 2 5 mg tablet Take 1 tablet (2 5 mg total) by mouth daily 90 tablet 0    mometasone (ELOCON) 0 1 % cream APPLY TO AFFECTED AREA EVERY DAY 45 g 0    Multiple Vitamins-Iron (QC DAILY MULTIVITAMINS/IRON) TABS Take by mouth daily      NAFTIN 2 % GEL APPLY TOPICALLY TWICE DAILY PRN  0    nebivolol (BYSTOLIC) 5 mg tablet Take 1 tablet (5 mg total) by mouth daily 90 tablet 1    pravastatin (PRAVACHOL) 10 mg tablet Take 1 tablet (10 mg total) by mouth daily 90 tablet 1    Probiotic Product (PROBIOTIC DAILY) CAPS Take by mouth daily       sAXagliptin-metFORMIN ER (KOMBIGLYZE XR) 5-1000 MG TB24 Take 1 tablet by mouth daily 90 tablet 1    telmisartan (MICARDIS) 40 mg tablet Take 1 tablet (40 mg total) by mouth daily 90 tablet 1     No current facility-administered medications for this visit  Objective:    /70   Pulse 68   Temp 98 3 °F (36 8 °C)   Resp 16   Ht 5' 2 75" (1 594 m)   Wt 54 kg (119 lb)   SpO2 98%   BMI 21 25 kg/m²        Physical Exam   Constitutional: She appears well-developed and well-nourished  No distress  HENT:   Head: Normocephalic and atraumatic  Right Ear: External ear normal    Left Ear: External ear normal    Nose: Nose normal    Mouth/Throat: Oropharynx is clear and moist  No oropharyngeal exudate  Eyes: Pupils are equal, round, and reactive to light  EOM are normal  Right eye exhibits no discharge  Left eye exhibits no discharge  No scleral icterus  Neck: No thyromegaly present  Cardiovascular: Normal rate and normal heart sounds  No murmur heard  Pulmonary/Chest: Effort normal and breath sounds normal  No respiratory distress  She has no wheezes  Abdominal: Soft  Bowel sounds are normal  She exhibits no distension and no mass  There is no tenderness  There is no rebound and no guarding  Musculoskeletal: Normal range of motion  Neurological: She is alert  She displays normal reflexes  Coordination normal    Skin: Skin is warm and dry  No rash noted  She is not diaphoretic  No erythema  Psychiatric: She has a normal mood and affect  Her behavior is normal    Nursing note and vitals reviewed             Recent Results (from the past 672 hour(s))   Comprehensive metabolic panel    Collection Time: 12/24/19 10:00 AM   Result Value Ref Range    Glucose, Random 116 (H) 65 - 99 mg/dL    BUN 18 7 - 25 mg/dL    Creatinine 0 98 0 50 - 1 05 mg/dL    eGFR Non  64 > OR = 60 mL/min/1 73m2    eGFR  74 > OR = 60 mL/min/1 73m2    SL AMB BUN/CREATININE RATIO NOT APPLICABLE 6 - 22 (calc)    Sodium 137 135 - 146 mmol/L    Potassium 4 3 3 5 - 5 3 mmol/L    Chloride 100 98 - 110 mmol/L    CO2 28 20 - 32 mmol/L    SL AMB CALCIUM 9 6 8 6 - 10 4 mg/dL    Protein, Total 7 2 6 1 - 8 1 g/dL    Albumin 4 5 3 6 - 5 1 g/dL    Globulin 2 7 1 9 - 3 7 g/dL (calc)    Albumin/Globulin Ratio 1 7 1 0 - 2 5 (calc)    TOTAL BILIRUBIN 0 5 0 2 - 1 2 mg/dL    Alkaline Phosphatase 73 33 - 130 U/L    AST 19 10 - 35 U/L    ALT 16 6 - 29 U/L   Cancer antigen 27 29    Collection Time: 12/24/19 10:00 AM   Result Value Ref Range    CA 27 29 26 <38 U/mL   CBC and differential    Collection Time: 12/24/19 10:00 AM   Result Value Ref Range    White Blood Cell Count 7 2 3 8 - 10 8 Thousand/uL    Red Blood Cell Count 5 03 3 80 - 5 10 Million/uL    Hemoglobin 13 6 11 7 - 15 5 g/dL    HCT 41 6 35 0 - 45 0 %    MCV 82 7 80 0 - 100 0 fL    MCH 27 0 27 0 - 33 0 pg    MCHC 32 7 32 0 - 36 0 g/dL    RDW 13 7 11 0 - 15 0 %    Platelet Count 962 958 - 400 Thousand/uL    SL AMB MPV 10 0 7 5 - 12 5 fL    Neutrophils (Absolute) 5,328 1,500 - 7,800 cells/uL    Lymphocytes (Absolute) 1,382 850 - 3,900 cells/uL    Monocytes (Absolute) 490 200 - 950 cells/uL    Eosinophils (Absolute) 0 (L) 15 - 500 cells/uL    Basophils ABS 0 0 - 200 cells/uL    Neutrophils 74 %    Lymphocytes 19 2 %    Monocytes 6 8 %    Eosinophils 0 0 %    Basophils PCT 0 0 %   Cancer antigen 15-3    Collection Time: 12/24/19 10:00 AM   Result Value Ref Range    CA 15-3 15 <32 U/mL       Collection Time: 12/24/19 10:00 AM   Result Value Ref Range    Cancer Antigen (CA) 125 5 <35 U/mL   Lipid Panel with Direct LDL reflex    Collection Time: 12/24/19 10:05 AM   Result Value Ref Range    Total Cholesterol 191 <200 mg/dL    HDL 54 >50 mg/dL    Triglycerides 104 <150 mg/dL    LDL Direct 116 (H) mg/dL (calc)    Chol HDLC Ratio 3 5 <5 0 (calc) Non-HDL Cholesterol 137 (H) <130 mg/dL (calc)   Hemoglobin A1c (w/out EAG)    Collection Time: 12/24/19 10:05 AM   Result Value Ref Range    Hemoglobin A1C 6 0 (H) <5 7 % of total Hgb         Glenna Rogers DO

## 2020-02-05 DIAGNOSIS — J30.1 SEASONAL ALLERGIC RHINITIS DUE TO POLLEN: ICD-10-CM

## 2020-02-05 DIAGNOSIS — I10 BENIGN ESSENTIAL HYPERTENSION: ICD-10-CM

## 2020-02-05 RX ORDER — NEBIVOLOL HYDROCHLORIDE 5 MG/1
TABLET ORAL
Qty: 90 TABLET | Refills: 1 | Status: SHIPPED | OUTPATIENT
Start: 2020-02-05 | End: 2020-06-24 | Stop reason: SDUPTHER

## 2020-02-05 RX ORDER — FLUTICASONE PROPIONATE 50 MCG
SPRAY, SUSPENSION (ML) NASAL
Qty: 48 G | Refills: 1 | Status: SHIPPED | OUTPATIENT
Start: 2020-02-05 | End: 2020-06-27

## 2020-04-21 ENCOUNTER — OFFICE VISIT (OUTPATIENT)
Dept: CARDIOLOGY CLINIC | Facility: CLINIC | Age: 60
End: 2020-04-21
Payer: COMMERCIAL

## 2020-04-21 VITALS
WEIGHT: 117 LBS | SYSTOLIC BLOOD PRESSURE: 130 MMHG | HEIGHT: 63 IN | DIASTOLIC BLOOD PRESSURE: 82 MMHG | HEART RATE: 77 BPM | BODY MASS INDEX: 20.73 KG/M2 | OXYGEN SATURATION: 98 %

## 2020-04-21 DIAGNOSIS — C50.919 MALIGNANT NEOPLASM OF FEMALE BREAST, UNSPECIFIED ESTROGEN RECEPTOR STATUS, UNSPECIFIED LATERALITY, UNSPECIFIED SITE OF BREAST (HCC): ICD-10-CM

## 2020-04-21 DIAGNOSIS — I10 BENIGN ESSENTIAL HYPERTENSION: ICD-10-CM

## 2020-04-21 DIAGNOSIS — E78.2 MIXED HYPERLIPIDEMIA: Primary | ICD-10-CM

## 2020-04-21 DIAGNOSIS — E11.3293 TYPE 2 DIABETES MELLITUS WITH BOTH EYES AFFECTED BY MILD NONPROLIFERATIVE RETINOPATHY WITHOUT MACULAR EDEMA, WITHOUT LONG-TERM CURRENT USE OF INSULIN (HCC): ICD-10-CM

## 2020-04-21 DIAGNOSIS — R00.2 PALPITATIONS: ICD-10-CM

## 2020-04-21 PROCEDURE — 3079F DIAST BP 80-89 MM HG: CPT | Performed by: INTERNAL MEDICINE

## 2020-04-21 PROCEDURE — 99244 OFF/OP CNSLTJ NEW/EST MOD 40: CPT | Performed by: INTERNAL MEDICINE

## 2020-04-21 PROCEDURE — 3075F SYST BP GE 130 - 139MM HG: CPT | Performed by: INTERNAL MEDICINE

## 2020-04-21 PROCEDURE — 93000 ELECTROCARDIOGRAM COMPLETE: CPT | Performed by: INTERNAL MEDICINE

## 2020-04-21 RX ORDER — FEXOFENADINE HCL 180 MG/1
360 TABLET ORAL DAILY
COMMUNITY

## 2020-04-21 RX ORDER — ZINC GLUCONATE 50 MG
50 TABLET ORAL DAILY
COMMUNITY

## 2020-04-24 DIAGNOSIS — E11.3293 TYPE 2 DIABETES MELLITUS WITH BOTH EYES AFFECTED BY MILD NONPROLIFERATIVE RETINOPATHY WITHOUT MACULAR EDEMA, WITHOUT LONG-TERM CURRENT USE OF INSULIN (HCC): ICD-10-CM

## 2020-04-24 DIAGNOSIS — E78.2 MIXED HYPERLIPIDEMIA: ICD-10-CM

## 2020-04-24 DIAGNOSIS — C50.919 MALIGNANT NEOPLASM OF BREAST IN FEMALE, ESTROGEN RECEPTOR POSITIVE, UNSPECIFIED LATERALITY, UNSPECIFIED SITE OF BREAST (HCC): ICD-10-CM

## 2020-04-24 DIAGNOSIS — I10 BENIGN ESSENTIAL HYPERTENSION: ICD-10-CM

## 2020-04-24 DIAGNOSIS — Z17.0 MALIGNANT NEOPLASM OF BREAST IN FEMALE, ESTROGEN RECEPTOR POSITIVE, UNSPECIFIED LATERALITY, UNSPECIFIED SITE OF BREAST (HCC): ICD-10-CM

## 2020-04-24 PROCEDURE — 4010F ACE/ARB THERAPY RXD/TAKEN: CPT | Performed by: INTERNAL MEDICINE

## 2020-04-24 RX ORDER — TELMISARTAN 40 MG/1
TABLET ORAL
Qty: 90 TABLET | Refills: 1 | Status: SHIPPED | OUTPATIENT
Start: 2020-04-24 | End: 2020-10-26 | Stop reason: SDUPTHER

## 2020-04-24 RX ORDER — PRAVASTATIN SODIUM 10 MG
TABLET ORAL
Qty: 90 TABLET | Refills: 1 | Status: SHIPPED | OUTPATIENT
Start: 2020-04-24 | End: 2020-06-24 | Stop reason: SDUPTHER

## 2020-04-24 RX ORDER — SAXAGLIPTIN AND METFORMIN HYDROCHLORIDE 5; 1000 MG/1; MG/1
TABLET, FILM COATED, EXTENDED RELEASE ORAL
Qty: 90 TABLET | Refills: 1 | Status: SHIPPED | OUTPATIENT
Start: 2020-04-24 | End: 2020-06-24 | Stop reason: SDUPTHER

## 2020-04-27 RX ORDER — LETROZOLE 2.5 MG/1
TABLET, FILM COATED ORAL
Qty: 90 TABLET | Refills: 2 | Status: SHIPPED | OUTPATIENT
Start: 2020-04-27 | End: 2020-06-24 | Stop reason: SDUPTHER

## 2020-06-22 LAB
ALBUMIN/CREAT UR: 7 MCG/MG CREAT
CHOLEST SERPL-MCNC: 158 MG/DL
CHOLEST/HDLC SERPL: 3.2 (CALC)
CREAT UR-MCNC: 29 MG/DL (ref 20–275)
HBA1C MFR BLD: 5.9 % OF TOTAL HGB
HDLC SERPL-MCNC: 49 MG/DL
HIV 1+2 AB+HIV1 P24 AG SERPL QL IA: NORMAL
LDLC SERPL CALC-MCNC: 90 MG/DL (CALC)
MICROALBUMIN UR-MCNC: 0.2 MG/DL
NONHDLC SERPL-MCNC: 109 MG/DL (CALC)
TRIGL SERPL-MCNC: 95 MG/DL

## 2020-06-22 PROCEDURE — 3061F NEG MICROALBUMINURIA REV: CPT | Performed by: INTERNAL MEDICINE

## 2020-06-22 PROCEDURE — 3044F HG A1C LEVEL LT 7.0%: CPT | Performed by: INTERNAL MEDICINE

## 2020-06-23 ENCOUNTER — DOCUMENTATION (OUTPATIENT)
Dept: HEMATOLOGY ONCOLOGY | Facility: MEDICAL CENTER | Age: 60
End: 2020-06-23

## 2020-06-23 ENCOUNTER — TELEPHONE (OUTPATIENT)
Dept: HEMATOLOGY ONCOLOGY | Facility: CLINIC | Age: 60
End: 2020-06-23

## 2020-06-24 ENCOUNTER — OFFICE VISIT (OUTPATIENT)
Dept: FAMILY MEDICINE CLINIC | Facility: CLINIC | Age: 60
End: 2020-06-24
Payer: COMMERCIAL

## 2020-06-24 ENCOUNTER — OFFICE VISIT (OUTPATIENT)
Dept: HEMATOLOGY ONCOLOGY | Facility: MEDICAL CENTER | Age: 60
End: 2020-06-24
Payer: COMMERCIAL

## 2020-06-24 VITALS
OXYGEN SATURATION: 98 % | BODY MASS INDEX: 19.67 KG/M2 | TEMPERATURE: 98.7 F | RESPIRATION RATE: 18 BRPM | WEIGHT: 111 LBS | DIASTOLIC BLOOD PRESSURE: 70 MMHG | HEART RATE: 87 BPM | SYSTOLIC BLOOD PRESSURE: 130 MMHG | HEIGHT: 63 IN

## 2020-06-24 VITALS
BODY MASS INDEX: 19.84 KG/M2 | HEART RATE: 60 BPM | DIASTOLIC BLOOD PRESSURE: 72 MMHG | RESPIRATION RATE: 16 BRPM | WEIGHT: 112 LBS | OXYGEN SATURATION: 97 % | TEMPERATURE: 97.8 F | SYSTOLIC BLOOD PRESSURE: 120 MMHG | HEIGHT: 63 IN

## 2020-06-24 DIAGNOSIS — C50.919 MALIGNANT NEOPLASM OF BREAST IN FEMALE, ESTROGEN RECEPTOR POSITIVE, UNSPECIFIED LATERALITY, UNSPECIFIED SITE OF BREAST (HCC): ICD-10-CM

## 2020-06-24 DIAGNOSIS — C50.911 ADENOCARCINOMA OF RIGHT BREAST (HCC): Primary | ICD-10-CM

## 2020-06-24 DIAGNOSIS — E78.2 MIXED HYPERLIPIDEMIA: ICD-10-CM

## 2020-06-24 DIAGNOSIS — Z17.0 MALIGNANT NEOPLASM OF BREAST IN FEMALE, ESTROGEN RECEPTOR POSITIVE, UNSPECIFIED LATERALITY, UNSPECIFIED SITE OF BREAST (HCC): ICD-10-CM

## 2020-06-24 DIAGNOSIS — E11.3293 TYPE 2 DIABETES MELLITUS WITH BOTH EYES AFFECTED BY MILD NONPROLIFERATIVE RETINOPATHY WITHOUT MACULAR EDEMA, WITHOUT LONG-TERM CURRENT USE OF INSULIN (HCC): Primary | ICD-10-CM

## 2020-06-24 DIAGNOSIS — I10 BENIGN ESSENTIAL HYPERTENSION: ICD-10-CM

## 2020-06-24 PROBLEM — L50.1 IDIOPATHIC URTICARIA: Status: ACTIVE | Noted: 2019-07-15

## 2020-06-24 LAB
ALBUMIN SERPL-MCNC: 4.2 G/DL (ref 3.6–5.1)
ALBUMIN/GLOB SERPL: 1.6 (CALC) (ref 1–2.5)
ALP SERPL-CCNC: 74 U/L (ref 37–153)
ALT SERPL-CCNC: 12 U/L (ref 6–29)
AST SERPL-CCNC: 15 U/L (ref 10–35)
BASOPHILS # BLD AUTO: 7 CELLS/UL (ref 0–200)
BASOPHILS NFR BLD AUTO: 0.1 %
BILIRUB SERPL-MCNC: 0.4 MG/DL (ref 0.2–1.2)
BUN SERPL-MCNC: 20 MG/DL (ref 7–25)
BUN/CREAT SERPL: ABNORMAL (CALC) (ref 6–22)
CALCIUM SERPL-MCNC: 9.5 MG/DL (ref 8.6–10.4)
CANCER AG125 SERPL-ACNC: 6 U/ML
CANCER AG15-3 SERPL-ACNC: 13 U/ML
CANCER AG27-29 SERPL-ACNC: 29 U/ML
CHLORIDE SERPL-SCNC: 100 MMOL/L (ref 98–110)
CO2 SERPL-SCNC: 27 MMOL/L (ref 20–32)
CREAT SERPL-MCNC: 1 MG/DL (ref 0.5–1.05)
EOSINOPHIL # BLD AUTO: 0 CELLS/UL (ref 15–500)
EOSINOPHIL NFR BLD AUTO: 0 %
ERYTHROCYTE [DISTWIDTH] IN BLOOD BY AUTOMATED COUNT: 13.5 % (ref 11–15)
GLOBULIN SER CALC-MCNC: 2.7 G/DL (CALC) (ref 1.9–3.7)
GLUCOSE SERPL-MCNC: 130 MG/DL (ref 65–99)
HCT VFR BLD AUTO: 37.6 % (ref 35–45)
HGB BLD-MCNC: 12.6 G/DL (ref 11.7–15.5)
LYMPHOCYTES # BLD AUTO: 1386 CELLS/UL (ref 850–3900)
LYMPHOCYTES NFR BLD AUTO: 19.8 %
MCH RBC QN AUTO: 28.1 PG (ref 27–33)
MCHC RBC AUTO-ENTMCNC: 33.5 G/DL (ref 32–36)
MCV RBC AUTO: 83.7 FL (ref 80–100)
MONOCYTES # BLD AUTO: 525 CELLS/UL (ref 200–950)
MONOCYTES NFR BLD AUTO: 7.5 %
NEUTROPHILS # BLD AUTO: 5082 CELLS/UL (ref 1500–7800)
NEUTROPHILS NFR BLD AUTO: 72.6 %
PLATELET # BLD AUTO: 188 THOUSAND/UL (ref 140–400)
PMV BLD REES-ECKER: 10.1 FL (ref 7.5–12.5)
POTASSIUM SERPL-SCNC: 4.6 MMOL/L (ref 3.5–5.3)
PROT SERPL-MCNC: 6.9 G/DL (ref 6.1–8.1)
RBC # BLD AUTO: 4.49 MILLION/UL (ref 3.8–5.1)
SL AMB EGFR AFRICAN AMERICAN: 72 ML/MIN/1.73M2
SL AMB EGFR NON AFRICAN AMERICAN: 62 ML/MIN/1.73M2
SODIUM SERPL-SCNC: 136 MMOL/L (ref 135–146)
WBC # BLD AUTO: 7 THOUSAND/UL (ref 3.8–10.8)

## 2020-06-24 PROCEDURE — 3078F DIAST BP <80 MM HG: CPT | Performed by: INTERNAL MEDICINE

## 2020-06-24 PROCEDURE — 3074F SYST BP LT 130 MM HG: CPT | Performed by: FAMILY MEDICINE

## 2020-06-24 PROCEDURE — 1036F TOBACCO NON-USER: CPT | Performed by: FAMILY MEDICINE

## 2020-06-24 PROCEDURE — 3078F DIAST BP <80 MM HG: CPT | Performed by: FAMILY MEDICINE

## 2020-06-24 PROCEDURE — 1036F TOBACCO NON-USER: CPT | Performed by: INTERNAL MEDICINE

## 2020-06-24 PROCEDURE — 3008F BODY MASS INDEX DOCD: CPT | Performed by: INTERNAL MEDICINE

## 2020-06-24 PROCEDURE — 99213 OFFICE O/P EST LOW 20 MIN: CPT | Performed by: INTERNAL MEDICINE

## 2020-06-24 PROCEDURE — 99214 OFFICE O/P EST MOD 30 MIN: CPT | Performed by: FAMILY MEDICINE

## 2020-06-24 PROCEDURE — 3075F SYST BP GE 130 - 139MM HG: CPT | Performed by: INTERNAL MEDICINE

## 2020-06-24 PROCEDURE — 3008F BODY MASS INDEX DOCD: CPT | Performed by: FAMILY MEDICINE

## 2020-06-24 RX ORDER — PRAVASTATIN SODIUM 10 MG
10 TABLET ORAL DAILY
Qty: 90 TABLET | Refills: 1 | Status: SHIPPED | OUTPATIENT
Start: 2020-06-24 | End: 2020-11-16

## 2020-06-24 RX ORDER — LETROZOLE 2.5 MG/1
2.5 TABLET, FILM COATED ORAL DAILY
Qty: 90 TABLET | Refills: 0 | Status: SHIPPED | OUTPATIENT
Start: 2020-06-24 | End: 2020-08-04

## 2020-06-24 RX ORDER — NEBIVOLOL 5 MG/1
5 TABLET ORAL DAILY
Qty: 90 TABLET | Refills: 1 | Status: SHIPPED | OUTPATIENT
Start: 2020-06-24 | End: 2020-08-28 | Stop reason: SDUPTHER

## 2020-06-27 DIAGNOSIS — J30.1 SEASONAL ALLERGIC RHINITIS DUE TO POLLEN: ICD-10-CM

## 2020-06-27 RX ORDER — FLUTICASONE PROPIONATE 50 MCG
SPRAY, SUSPENSION (ML) NASAL
Qty: 48 G | Refills: 1 | Status: SHIPPED | OUTPATIENT
Start: 2020-06-27 | End: 2020-09-28 | Stop reason: ALTCHOICE

## 2020-07-06 ENCOUNTER — TELEPHONE (OUTPATIENT)
Dept: HEMATOLOGY ONCOLOGY | Facility: CLINIC | Age: 60
End: 2020-07-06

## 2020-07-06 NOTE — TELEPHONE ENCOUNTER
Patient is calling in  indicating that she has received a letter from [address] [place] [address] indicating that Jeanette Roque will not refill her script for letrozole   She indicated that she never received the script that he has sent on 06/24

## 2020-07-06 NOTE — TELEPHONE ENCOUNTER
Left message informing patient that Dr Wyatt Oh escribed the [place] on 06/24 and we have confirmation that it was received by [facility]  I attempted to contact the pharmacy to discuss however I am not given an option to speak with a representative  I asked that patient call them or call me with the number on the back of her prescription card

## 2020-07-15 NOTE — MISCELLANEOUS
----- Message from Shakila Llanos sent at 7/14/2020  3:38 PM CDT -----  Regarding: pts daughter - christy  Would like to know all of the pts immunizations for the home health nurse. Please call back at [phone].         Thank you,   Shakila Llanos     Message   Recorded as Task   Date: 03/09/2017 11:11 AM, Created By: Julienne Avalos   Task Name: Call Back   Assigned To: Balbina Currie   Regarding Patient: FARIDA Araya, Status: Active   Comment:    Julienne Avalos - 09 Mar 2017 11:11 AM     TASK CREATED  Caller: Self; Results Inquiry; [phone] (Home); [phone] (Work)  Monika Mcnair is inquiring about her Dexa scan results and would also like theme faxed to her  Her phone is [phone], fax [phone] - 09 Mar 2017 11:31 AM     TASK EDITED  can you please find out where patient went and get results  last dexa scan in chart is from 2015  thanks  Nel Gunter - 09 Mar 2017 11:52 AM     TASK REPLIED TO: Previously Assigned To Nel Gunter  I put the results on your desk, dr Najera Shadow already reviewed and wants you to see him to discuss further  Balbina Currie - 09 Mar 2017 3:16 PM     TASK EDITED  returned call to patient, reviewed results  faxed copy at patient request to [phone]  pt without questions at this time and chooses to continue with oral calcium plus vitamin d supplements  Active Problems    1  Abnormal tumor markers (795 89) (R97 8)   2  Adenocarcinoma of breast, unspecified laterality (174 9) (C50 919)   3  Anemia (285 9) (D64 9)   4  Anxiety (300 00) (F41 9)   5  Benign essential hypertension (401 1) (I10)   6  Breast cancer (174 9) (C50 919)   7  Diabetic Cataract (366 41)   8  Diabetic retinopathy (250 50,362 01) (E11 319)   9  Encounter for screening colonoscopy (V76 51) (Z12 11)   10  Ganglion of right wrist (727 41) (M67 431)   11  H/O mastectomy (V45 71) (Z90 10)   12  Mixed hyperlipidemia (272 2) (E78 2)   13  Must sit up to breath (786 02) (R06 01)   14  Osteoporosis (733 00) (M81 0)   15  Palpitations (785 1) (R00 2)   16  Pruritus (698 9) (L29 9)   17  Refusal of statin medication by patient (V64 2) (Z53 29)   18  Travel sickness (994 6) (T75 3XXA)   19   Type 2 diabetes with ophthalmic manifestation (250 50) (E11 39)   20  Varicose veins without complication (412 5) (G01 56)   21  Well adult on routine health check (V70 0) (Z00 00)    Current Meds   1  Amlodipine-Olmesartan 5-40 MG Oral Tablet (Justice); Take 1 tablet by mouth  every day; Therapy: 46CEK3869 to (Evaluate:31Ors5241)  Requested for: 71ZXR1960; Last   Rx:03Npp2978 Ordered   2  Aspirin 81 MG TABS; 1 every day; Therapy: 09QUH0611 to  Requested for: 49Kks5379 Recorded   3  Glucocard Vital Test In Vitro Strip; TEST 5 TIMES DAILY; Therapy: 59LYN0645 to (Last Rx:01Mar2017)  Requested for: 62MUK9273 Ordered   4  Kombiglyze XR 5-1000 MG Oral Tablet Extended Release 24 Hour; Take 1 tablet by   mouth  every day; Therapy: 66QTM3486 to (Pili Garcias)  Requested for: 07EUR5712; Last   Rx:09Mar2017 Ordered   5  Letrozole 2 5 MG Oral Tablet; Take 1 tablet daily  Requested for: 82Rsi1365; Last   Rx:41Szm3081; Status: ACTIVE - Retrospective By Protocol Authorization Ordered   6  Multivitamins TABS; 1 Every Day; Therapy: 83ERL4333 to  Requested for: 83MJG2904 Recorded   7  Probiotic Daily CAPS; Therapy: (Recorded:16Nov2014) to Recorded    Allergies    1  Codeine Derivatives   2  Erythromycin TABS   3  Penicillins   4   Sulfa Drugs    Signatures   Electronically signed by : Santos Regalado RN; Mar  9 2017  3:16PM EST                       (Author)

## 2020-08-04 DIAGNOSIS — Z17.0 MALIGNANT NEOPLASM OF BREAST IN FEMALE, ESTROGEN RECEPTOR POSITIVE, UNSPECIFIED LATERALITY, UNSPECIFIED SITE OF BREAST (HCC): ICD-10-CM

## 2020-08-04 DIAGNOSIS — C50.919 MALIGNANT NEOPLASM OF BREAST IN FEMALE, ESTROGEN RECEPTOR POSITIVE, UNSPECIFIED LATERALITY, UNSPECIFIED SITE OF BREAST (HCC): ICD-10-CM

## 2020-08-04 RX ORDER — LETROZOLE 2.5 MG/1
TABLET, FILM COATED ORAL
Qty: 90 TABLET | Refills: 1 | Status: SHIPPED | OUTPATIENT
Start: 2020-08-04 | End: 2021-02-05 | Stop reason: SDUPTHER

## 2020-08-05 ENCOUNTER — TELEPHONE (OUTPATIENT)
Dept: FAMILY MEDICINE CLINIC | Facility: CLINIC | Age: 60
End: 2020-08-05

## 2020-08-05 NOTE — TELEPHONE ENCOUNTER
Patient called for "advise" on an injury she sustained at work  I told her she would need to file a claim with work for workers comp  She stated she just needed advice  We are unable to give her advice on this as we are not treating her for this WC injury      FYI

## 2020-08-28 DIAGNOSIS — I10 BENIGN ESSENTIAL HYPERTENSION: ICD-10-CM

## 2020-08-28 DIAGNOSIS — E11.3293 TYPE 2 DIABETES MELLITUS WITH BOTH EYES AFFECTED BY MILD NONPROLIFERATIVE RETINOPATHY WITHOUT MACULAR EDEMA, WITHOUT LONG-TERM CURRENT USE OF INSULIN (HCC): ICD-10-CM

## 2020-08-28 RX ORDER — SAXAGLIPTIN AND METFORMIN HYDROCHLORIDE 5; 1000 MG/1; MG/1
1 TABLET, FILM COATED, EXTENDED RELEASE ORAL DAILY
Qty: 90 TABLET | Refills: 0 | Status: SHIPPED | OUTPATIENT
Start: 2020-08-28 | End: 2021-02-01 | Stop reason: SDUPTHER

## 2020-08-28 RX ORDER — NEBIVOLOL 5 MG/1
5 TABLET ORAL DAILY
Qty: 90 TABLET | Refills: 0 | Status: SHIPPED | OUTPATIENT
Start: 2020-08-28 | End: 2021-06-28 | Stop reason: SDUPTHER

## 2020-09-28 ENCOUNTER — OFFICE VISIT (OUTPATIENT)
Dept: OBGYN CLINIC | Facility: CLINIC | Age: 60
End: 2020-09-28
Payer: COMMERCIAL

## 2020-09-28 VITALS
DIASTOLIC BLOOD PRESSURE: 60 MMHG | TEMPERATURE: 97.3 F | SYSTOLIC BLOOD PRESSURE: 120 MMHG | BODY MASS INDEX: 18.25 KG/M2 | WEIGHT: 103 LBS

## 2020-09-28 DIAGNOSIS — E11.3293 TYPE 2 DIABETES MELLITUS WITH BOTH EYES AFFECTED BY MILD NONPROLIFERATIVE RETINOPATHY WITHOUT MACULAR EDEMA, WITHOUT LONG-TERM CURRENT USE OF INSULIN (HCC): Primary | ICD-10-CM

## 2020-09-28 DIAGNOSIS — Z12.39 SCREENING FOR MALIGNANT NEOPLASM OF BREAST: ICD-10-CM

## 2020-09-28 DIAGNOSIS — Z01.419 ENCOUNTER FOR GYNECOLOGICAL EXAMINATION WITHOUT ABNORMAL FINDING: Primary | ICD-10-CM

## 2020-09-28 PROCEDURE — 1036F TOBACCO NON-USER: CPT | Performed by: OBSTETRICS & GYNECOLOGY

## 2020-09-28 PROCEDURE — 3078F DIAST BP <80 MM HG: CPT | Performed by: OBSTETRICS & GYNECOLOGY

## 2020-09-28 PROCEDURE — 99386 PREV VISIT NEW AGE 40-64: CPT | Performed by: OBSTETRICS & GYNECOLOGY

## 2020-09-28 RX ORDER — LANCETS
EACH MISCELLANEOUS
Qty: 102 EACH | Refills: 3 | Status: SHIPPED | OUTPATIENT
Start: 2020-09-28 | End: 2021-09-10

## 2020-09-28 RX ORDER — BLOOD SUGAR DIAGNOSTIC
STRIP MISCELLANEOUS
Qty: 100 EACH | Refills: 3 | Status: SHIPPED | OUTPATIENT
Start: 2020-09-28 | End: 2021-09-10

## 2020-09-28 NOTE — PROGRESS NOTES
Subjective      Saida Nelson is a 63 y o   female who presents for new patient annual well woman exam   Patient was previously following with Dr Issa Angulo and was getting yearly Pap at that time  Last Pap and mammogram about a year ago  Patient has history of breast cancer with recurrence in the right breast continues to follow with Dr Tommy Antonio and is being treated with letrozole  Patient reports no bleeding, spotting, or discharge  Patient reports Yes  hot flashes/night sweats mild, not sexually active with  he is on medication No vaginal dryness, sleeping fairly well  Current contraception: abstinence and post menopausal status  History of abnormal Pap smear: yes - in remote past, had biopsy and follow-up normal since  Family history of uterine or ovarian cancer: no  Regular self breast exam: yes  History of abnormal mammogram: yes - history of breast cancer with recurrence lumpectomy in  followed by mastectomy in   Family history of breast cancer: yes - paternal aunt    Menstrual History:  OB History        2    Para   2    Term                AB        Living   2       SAB        TAB        Ectopic        Multiple        Live Births   2               x2 largest 9 lb      No LMP recorded   Patient is postmenopausal      Past Medical History:   Diagnosis Date    Abnormal liver function test     Acute upper respiratory infection 2008    [facility] [place] [address]     Cough 2008    Diabetes mellitus ([facility]     History of acute sinusitis 2010    History of diarrhea     History of hypertension     History of motion sickness 2008    History of orthopnea 01/10/2008     Past Surgical History:   Procedure Laterality Date    BREAST LUMPECTOMY      MASTECTOMY      TOOTH EXTRACTION       Current Outpatient Medications   Medication Instructions    Accu-Chek FastClix Lancets MISC USE TO TEST DAILY    Accu-Chek Guide test strip USE TO TEST DAILY    aspirin 81 MG tablet Oral, Daily    Cholecalciferol (VITAMIN D3) LIQD No dose, route, or frequency recorded   Ferrous Sulfate (IRON) 325 (65 Fe) MG TABS Oral, Daily    fexofenadine (ALLEGRA) 360 mg, Oral, Daily    Glucosamine-Chondroitin 250-200 MG TABS Oral, Daily    letrozole (FEMARA) 2 5 mg tablet TAKE 1 TABLET DAILY    Multiple Vitamins-Iron (QC DAILY MULTIVITAMINS/IRON) TABS Oral, Daily    nebivolol (BYSTOLIC) 5 mg, Oral, Daily    pravastatin (PRAVACHOL) 10 mg, Oral, Daily    Probiotic Product (PROBIOTIC DAILY) CAPS Oral, Daily    sAXagliptin-metFORMIN ER (Kombiglyze XR) 5-1000 MG TB24 1 tablet, Oral, Daily    telmisartan (MICARDIS) 40 mg tablet TAKE 1 TABLET DAILY    zinc gluconate 50 mg, Oral, Daily       Allergies   Allergen Reactions    Erythromycin Swelling     Reaction Date: 23Aug2007;     Penicillins Hives     Reaction Date: 23Aug2007;     Codeine Nausea Only     Reaction Date: 23Aug2007;     Sulfamethoxazole-Trimethoprim Hives    Sulfate Rash and Fever     Reaction Date: 23Aug2007;        Social History     Tobacco Use    Smoking status: Never Smoker    Smokeless tobacco: Never Used   Substance Use Topics    Alcohol use: Yes     Comment: social drinker    Drug use: No       Review of Systems  Review of Systems   Constitutional: Negative for fatigue, fever and unexpected weight change  HENT: Negative for dental problem, sinus pressure and sinus pain  Eyes: Negative for visual disturbance  Respiratory: Negative for cough, shortness of breath and wheezing  Cardiovascular: Negative for chest pain and leg swelling  Gastrointestinal: Negative for blood in stool, constipation, diarrhea, nausea and vomiting  Endocrine: Negative for cold intolerance, heat intolerance and polydipsia  Genitourinary: Negative for dysuria, frequency, hematuria, menstrual problem and pelvic pain  Musculoskeletal: Negative for arthralgias and back pain     Neurological: Negative for dizziness, seizures and headaches  Psychiatric/Behavioral: The patient is not nervous/anxious  Objective   Vitals:    20 1710   BP: 120/60   Temp: (!) 97 3 °F (36 3 °C)   Weight: 46 7 kg (103 lb)       General:   alert and oriented, in no acute distress   Heart: regular rate and rhythm, S1, S2 normal, no murmur, click, rub or gallop   Lungs: clear to auscultation bilaterally   Abdomen: soft, non-tender, without masses or organomegaly   Vulva: normal   Vagina: normal mucosa   Cervix: no bleeding following Pap, no cervical motion tenderness and no lesions   Uterus: normal size, mobile, non-tender   Adnexa: normal adnexa and no mass, fullness, tenderness   Breast inspection negative, no nipple discharge or bleeding, no masses or nodularity palpable and inspection left negative, no nipple discharge or bleeding, no masses or nodularity palpable and right side surgically absent well-healed scars  Rectal negative, stool guaiac negative  Thyroid normal no masses or nodules     Assessment   56-year-old  with history of breast cancer here for annual exam   We have no records of her Pap smears we do have a copy of her mammogram from 2019     Plan   Thin prep with Co testing performed    Given slip for left mammogram for November return in 1 year or sooner as needed

## 2020-10-01 ENCOUNTER — TELEPHONE (OUTPATIENT)
Dept: OBGYN CLINIC | Facility: CLINIC | Age: 60
End: 2020-10-01

## 2020-10-01 LAB
CLINICAL INFO: NORMAL
DATE PREVIOUS BX: NORMAL
HPV I/H RISK 1 DNA CVX QL PROBE+SIG AMP: NOT DETECTED
LMP START DATE: NORMAL
SL AMB PREV. PAP:: NORMAL
SPECIMEN SOURCE CVX/VAG CYTO: NORMAL

## 2020-10-26 DIAGNOSIS — I10 BENIGN ESSENTIAL HYPERTENSION: ICD-10-CM

## 2020-10-26 RX ORDER — TELMISARTAN 40 MG/1
40 TABLET ORAL DAILY
Qty: 90 TABLET | Refills: 0 | Status: SHIPPED | OUTPATIENT
Start: 2020-10-26 | End: 2020-11-16

## 2020-11-12 ENCOUNTER — TELEPHONE (OUTPATIENT)
Dept: OBGYN CLINIC | Facility: CLINIC | Age: 60
End: 2020-11-12

## 2020-11-13 ENCOUNTER — HOSPITAL ENCOUNTER (OUTPATIENT)
Dept: MAMMOGRAPHY | Facility: HOSPITAL | Age: 60
Discharge: HOME | End: 2020-11-13
Attending: OBSTETRICS & GYNECOLOGY
Payer: COMMERCIAL

## 2020-11-13 VITALS — BODY MASS INDEX: 18.25 KG/M2 | HEIGHT: 63 IN | WEIGHT: 103 LBS

## 2020-11-13 DIAGNOSIS — Z12.39 SCREENING FOR MALIGNANT NEOPLASM OF BREAST: ICD-10-CM

## 2020-11-13 PROCEDURE — 77067 SCR MAMMO BI INCL CAD: CPT

## 2020-11-13 PROCEDURE — 77063 BREAST TOMOSYNTHESIS BI: CPT

## 2020-11-14 DIAGNOSIS — I10 BENIGN ESSENTIAL HYPERTENSION: ICD-10-CM

## 2020-11-14 DIAGNOSIS — E78.2 MIXED HYPERLIPIDEMIA: ICD-10-CM

## 2020-11-16 RX ORDER — TELMISARTAN 40 MG/1
TABLET ORAL
Qty: 90 TABLET | Refills: 0 | Status: SHIPPED | OUTPATIENT
Start: 2020-11-16 | End: 2021-02-01 | Stop reason: SDUPTHER

## 2020-11-16 RX ORDER — PRAVASTATIN SODIUM 10 MG
TABLET ORAL
Qty: 90 TABLET | Refills: 1 | Status: SHIPPED | OUTPATIENT
Start: 2020-11-16 | End: 2021-03-12

## 2020-12-07 ENCOUNTER — VBI (OUTPATIENT)
Dept: ADMINISTRATIVE | Facility: OTHER | Age: 60
End: 2020-12-07

## 2020-12-13 LAB
CHOLEST SERPL-MCNC: 149 MG/DL
CHOLEST/HDLC SERPL: 2.9 (CALC)
HBA1C MFR BLD: 5.6 % OF TOTAL HGB
HDLC SERPL-MCNC: 51 MG/DL
LDLC SERPL CALC-MCNC: 78 MG/DL (CALC)
NONHDLC SERPL-MCNC: 98 MG/DL (CALC)
TRIGL SERPL-MCNC: 122 MG/DL

## 2020-12-16 LAB
ALBUMIN SERPL-MCNC: 4.3 G/DL (ref 3.6–5.1)
ALBUMIN/GLOB SERPL: 1.7 (CALC) (ref 1–2.5)
ALP SERPL-CCNC: 65 U/L (ref 37–153)
ALT SERPL-CCNC: 10 U/L (ref 6–29)
AST SERPL-CCNC: 15 U/L (ref 10–35)
BASOPHILS # BLD AUTO: 27 CELLS/UL (ref 0–200)
BASOPHILS NFR BLD AUTO: 0.3 %
BILIRUB SERPL-MCNC: 0.5 MG/DL (ref 0.2–1.2)
BUN SERPL-MCNC: 13 MG/DL (ref 7–25)
BUN/CREAT SERPL: ABNORMAL (CALC) (ref 6–22)
CALCIUM SERPL-MCNC: 9.5 MG/DL (ref 8.6–10.4)
CANCER AG125 SERPL-ACNC: 4 U/ML
CANCER AG15-3 SERPL-ACNC: 15 U/ML
CANCER AG27-29 SERPL-ACNC: 24 U/ML
CHLORIDE SERPL-SCNC: 101 MMOL/L (ref 98–110)
CO2 SERPL-SCNC: 30 MMOL/L (ref 20–32)
CREAT SERPL-MCNC: 0.91 MG/DL (ref 0.5–1.05)
EOSINOPHIL # BLD AUTO: 0 CELLS/UL (ref 15–500)
EOSINOPHIL NFR BLD AUTO: 0 %
ERYTHROCYTE [DISTWIDTH] IN BLOOD BY AUTOMATED COUNT: 14.2 % (ref 11–15)
GLOBULIN SER CALC-MCNC: 2.6 G/DL (CALC) (ref 1.9–3.7)
GLUCOSE SERPL-MCNC: 120 MG/DL (ref 65–99)
HCT VFR BLD AUTO: 40.3 % (ref 35–45)
HGB BLD-MCNC: 13 G/DL (ref 11.7–15.5)
LYMPHOCYTES # BLD AUTO: 1692 CELLS/UL (ref 850–3900)
LYMPHOCYTES NFR BLD AUTO: 18.8 %
MCH RBC QN AUTO: 26.9 PG (ref 27–33)
MCHC RBC AUTO-ENTMCNC: 32.3 G/DL (ref 32–36)
MCV RBC AUTO: 83.3 FL (ref 80–100)
MONOCYTES # BLD AUTO: 522 CELLS/UL (ref 200–950)
MONOCYTES NFR BLD AUTO: 5.8 %
NEUTROPHILS # BLD AUTO: 6759 CELLS/UL (ref 1500–7800)
NEUTROPHILS NFR BLD AUTO: 75.1 %
PLATELET # BLD AUTO: 236 THOUSAND/UL (ref 140–400)
PMV BLD REES-ECKER: 9.5 FL (ref 7.5–12.5)
POTASSIUM SERPL-SCNC: 5 MMOL/L (ref 3.5–5.3)
PROT SERPL-MCNC: 6.9 G/DL (ref 6.1–8.1)
RBC # BLD AUTO: 4.84 MILLION/UL (ref 3.8–5.1)
SL AMB EGFR AFRICAN AMERICAN: 80 ML/MIN/1.73M2
SL AMB EGFR NON AFRICAN AMERICAN: 69 ML/MIN/1.73M2
SODIUM SERPL-SCNC: 137 MMOL/L (ref 135–146)
WBC # BLD AUTO: 9 THOUSAND/UL (ref 3.8–10.8)

## 2020-12-18 ENCOUNTER — DOCUMENTATION (OUTPATIENT)
Dept: HEMATOLOGY ONCOLOGY | Facility: MEDICAL CENTER | Age: 60
End: 2020-12-18

## 2020-12-18 ENCOUNTER — TELEPHONE (OUTPATIENT)
Dept: HEMATOLOGY ONCOLOGY | Facility: CLINIC | Age: 60
End: 2020-12-18

## 2020-12-21 ENCOUNTER — OFFICE VISIT (OUTPATIENT)
Dept: FAMILY MEDICINE CLINIC | Facility: CLINIC | Age: 60
End: 2020-12-21
Payer: COMMERCIAL

## 2020-12-21 ENCOUNTER — OFFICE VISIT (OUTPATIENT)
Dept: HEMATOLOGY ONCOLOGY | Facility: MEDICAL CENTER | Age: 60
End: 2020-12-21
Payer: COMMERCIAL

## 2020-12-21 VITALS
WEIGHT: 99.6 LBS | OXYGEN SATURATION: 98 % | DIASTOLIC BLOOD PRESSURE: 68 MMHG | SYSTOLIC BLOOD PRESSURE: 110 MMHG | BODY MASS INDEX: 17.65 KG/M2 | TEMPERATURE: 97.6 F | HEART RATE: 86 BPM | RESPIRATION RATE: 16 BRPM | HEIGHT: 63 IN

## 2020-12-21 VITALS
TEMPERATURE: 97.7 F | OXYGEN SATURATION: 100 % | RESPIRATION RATE: 16 BRPM | HEART RATE: 76 BPM | HEIGHT: 63 IN | WEIGHT: 99 LBS | BODY MASS INDEX: 17.54 KG/M2 | SYSTOLIC BLOOD PRESSURE: 126 MMHG | DIASTOLIC BLOOD PRESSURE: 70 MMHG

## 2020-12-21 DIAGNOSIS — Z12.11 SCREEN FOR COLON CANCER: ICD-10-CM

## 2020-12-21 DIAGNOSIS — E78.2 MIXED HYPERLIPIDEMIA: ICD-10-CM

## 2020-12-21 DIAGNOSIS — C50.911 ADENOCARCINOMA OF RIGHT BREAST (HCC): ICD-10-CM

## 2020-12-21 DIAGNOSIS — C50.911 ADENOCARCINOMA OF RIGHT BREAST (HCC): Primary | ICD-10-CM

## 2020-12-21 DIAGNOSIS — I10 BENIGN ESSENTIAL HYPERTENSION: Primary | ICD-10-CM

## 2020-12-21 DIAGNOSIS — E11.3293 TYPE 2 DIABETES MELLITUS WITH BOTH EYES AFFECTED BY MILD NONPROLIFERATIVE RETINOPATHY WITHOUT MACULAR EDEMA, WITHOUT LONG-TERM CURRENT USE OF INSULIN (HCC): ICD-10-CM

## 2020-12-21 PROCEDURE — 3008F BODY MASS INDEX DOCD: CPT | Performed by: FAMILY MEDICINE

## 2020-12-21 PROCEDURE — 99214 OFFICE O/P EST MOD 30 MIN: CPT | Performed by: FAMILY MEDICINE

## 2020-12-21 PROCEDURE — 99213 OFFICE O/P EST LOW 20 MIN: CPT | Performed by: INTERNAL MEDICINE

## 2020-12-21 PROCEDURE — 3074F SYST BP LT 130 MM HG: CPT | Performed by: INTERNAL MEDICINE

## 2020-12-21 PROCEDURE — 1036F TOBACCO NON-USER: CPT | Performed by: INTERNAL MEDICINE

## 2020-12-21 PROCEDURE — 3078F DIAST BP <80 MM HG: CPT | Performed by: INTERNAL MEDICINE

## 2020-12-22 ENCOUNTER — TELEPHONE (OUTPATIENT)
Dept: ADMINISTRATIVE | Facility: OTHER | Age: 60
End: 2020-12-22

## 2020-12-22 NOTE — TELEPHONE ENCOUNTER
----- Message from Luis Hernandez DO sent at 12/21/2020  9:10 AM EST -----  Regarding: Eye  Pt had diabetic eye exam in DR Hernandez - on [address] in [place]

## 2020-12-22 NOTE — LETTER
Diabetic Eye Exam Form    Date Requested: 20  Patient: Radha Garcia  Patient : 1960   Referring Provider: Klever Womack,     Dilated Retinal Exam, Optomap-Iris Exam, or Fundus Photography Done         Yes ([place] one above)         No     Date of Diabetic Eye Exam ______________________________  Left Eye      Exam did show retinopathy    Exam did not show retinopathy         Mild       Moderate       None       Proliferative       Severe     Right Eye     Exam did show retinopathy    Exam did not show retinopathy         Mild       Moderate       None       Proliferative       Severe     Comments __________________________________________________________    Practice Providing Exam ______________________________________________    Exam Performed By (print name) _______________________________________      Provider Signature ___________________________________________________      These reports are needed for  compliance    Please fax this completed form and a copy of the Diabetic Eye Exam report to our office located at [place] [address] as soon as possible to [phone] sebastián Plata: Phone [phone]    We thank you for your assistance in treating our mutual patient

## 2020-12-23 NOTE — TELEPHONE ENCOUNTER
[phone]     Faxed to Dr Nitza Urban    Upon review of the In Basket request and the patient's chart, initial outreach has been made via fax, please see Contacts section for details  A second outreach attempt will be made within 5 business days      Thank you  Justin Pro MA

## 2021-01-11 ENCOUNTER — TELEPHONE (OUTPATIENT)
Dept: FAMILY MEDICINE CLINIC | Facility: CLINIC | Age: 61
End: 2021-01-11

## 2021-01-19 NOTE — TELEPHONE ENCOUNTER
As a final attempt, a third outreach has been made via telephone call  Please see Contacts section for details  This encounter will be closed and completed by end of day  Should we receive the requested information because of previous outreach attempts, the requested patient's chart will be updated appropriately    Spoke to Corey Friends who is going to update fax number and fax patient last eye exaM    Thank you  Dilip Robles, [address]

## 2021-02-01 DIAGNOSIS — I10 BENIGN ESSENTIAL HYPERTENSION: ICD-10-CM

## 2021-02-01 DIAGNOSIS — E11.3293 TYPE 2 DIABETES MELLITUS WITH BOTH EYES AFFECTED BY MILD NONPROLIFERATIVE RETINOPATHY WITHOUT MACULAR EDEMA, WITHOUT LONG-TERM CURRENT USE OF INSULIN (HCC): ICD-10-CM

## 2021-02-01 RX ORDER — SAXAGLIPTIN AND METFORMIN HYDROCHLORIDE 5; 1000 MG/1; MG/1
1 TABLET, FILM COATED, EXTENDED RELEASE ORAL DAILY
Qty: 90 TABLET | Refills: 0 | Status: SHIPPED | OUTPATIENT
Start: 2021-02-01 | End: 2021-03-12

## 2021-02-01 RX ORDER — TELMISARTAN 40 MG/1
40 TABLET ORAL DAILY
Qty: 90 TABLET | Refills: 0 | Status: SHIPPED | OUTPATIENT
Start: 2021-02-01 | End: 2021-03-12

## 2021-02-05 ENCOUNTER — TELEPHONE (OUTPATIENT)
Dept: HEMATOLOGY ONCOLOGY | Facility: MEDICAL CENTER | Age: 61
End: 2021-02-05

## 2021-02-05 DIAGNOSIS — C50.919 MALIGNANT NEOPLASM OF BREAST IN FEMALE, ESTROGEN RECEPTOR POSITIVE, UNSPECIFIED LATERALITY, UNSPECIFIED SITE OF BREAST (HCC): ICD-10-CM

## 2021-02-05 DIAGNOSIS — Z17.0 MALIGNANT NEOPLASM OF BREAST IN FEMALE, ESTROGEN RECEPTOR POSITIVE, UNSPECIFIED LATERALITY, UNSPECIFIED SITE OF BREAST (HCC): ICD-10-CM

## 2021-02-05 RX ORDER — LETROZOLE 2.5 MG/1
2.5 TABLET, FILM COATED ORAL DAILY
Qty: 90 TABLET | Refills: 0 | Status: SHIPPED | OUTPATIENT
Start: 2021-02-05 | End: 2021-03-15 | Stop reason: SDUPTHER

## 2021-02-05 NOTE — TELEPHONE ENCOUNTER
daMedication Refill     Who is Calling  patient    Medication letrozole ([address] [facility]) 2 5 mg tablet        How many pills left 10-15   Preferred Pharmacy [facility]    Call back number [facility] [phone]

## 2021-02-05 NOTE — TELEPHONE ENCOUNTER
Patient is scheduled to see Dr Cirilo Santacruz in June  Will send refill request to RN with Dr Cirilo Santacruz

## 2021-03-10 DIAGNOSIS — Z23 ENCOUNTER FOR IMMUNIZATION: ICD-10-CM

## 2021-03-12 DIAGNOSIS — C50.919 MALIGNANT NEOPLASM OF BREAST IN FEMALE, ESTROGEN RECEPTOR POSITIVE, UNSPECIFIED LATERALITY, UNSPECIFIED SITE OF BREAST (HCC): ICD-10-CM

## 2021-03-12 DIAGNOSIS — Z17.0 MALIGNANT NEOPLASM OF BREAST IN FEMALE, ESTROGEN RECEPTOR POSITIVE, UNSPECIFIED LATERALITY, UNSPECIFIED SITE OF BREAST (HCC): ICD-10-CM

## 2021-03-12 DIAGNOSIS — I10 BENIGN ESSENTIAL HYPERTENSION: ICD-10-CM

## 2021-03-12 DIAGNOSIS — E78.2 MIXED HYPERLIPIDEMIA: ICD-10-CM

## 2021-03-12 DIAGNOSIS — E11.3293 TYPE 2 DIABETES MELLITUS WITH BOTH EYES AFFECTED BY MILD NONPROLIFERATIVE RETINOPATHY WITHOUT MACULAR EDEMA, WITHOUT LONG-TERM CURRENT USE OF INSULIN (HCC): ICD-10-CM

## 2021-03-12 RX ORDER — PRAVASTATIN SODIUM 10 MG
TABLET ORAL
Qty: 90 TABLET | Refills: 1 | Status: SHIPPED | OUTPATIENT
Start: 2021-03-12 | End: 2021-06-28 | Stop reason: SDUPTHER

## 2021-03-12 RX ORDER — LETROZOLE 2.5 MG/1
TABLET, FILM COATED ORAL
Refills: 0 | OUTPATIENT
Start: 2021-03-12

## 2021-03-12 RX ORDER — SAXAGLIPTIN AND METFORMIN HYDROCHLORIDE 5; 1000 MG/1; MG/1
TABLET, FILM COATED, EXTENDED RELEASE ORAL
Qty: 90 TABLET | Refills: 0 | Status: SHIPPED | OUTPATIENT
Start: 2021-03-12 | End: 2021-06-28 | Stop reason: SDUPTHER

## 2021-03-12 RX ORDER — TELMISARTAN 40 MG/1
TABLET ORAL
Qty: 90 TABLET | Refills: 0 | Status: SHIPPED | OUTPATIENT
Start: 2021-03-12 | End: 2021-06-28 | Stop reason: SDUPTHER

## 2021-03-15 DIAGNOSIS — Z17.0 MALIGNANT NEOPLASM OF BREAST IN FEMALE, ESTROGEN RECEPTOR POSITIVE, UNSPECIFIED LATERALITY, UNSPECIFIED SITE OF BREAST (HCC): ICD-10-CM

## 2021-03-15 DIAGNOSIS — C50.919 MALIGNANT NEOPLASM OF BREAST IN FEMALE, ESTROGEN RECEPTOR POSITIVE, UNSPECIFIED LATERALITY, UNSPECIFIED SITE OF BREAST (HCC): ICD-10-CM

## 2021-03-15 RX ORDER — LETROZOLE 2.5 MG/1
2.5 TABLET, FILM COATED ORAL DAILY
Qty: 90 TABLET | Refills: 0 | Status: SHIPPED | OUTPATIENT
Start: 2021-03-15 | End: 2021-07-02 | Stop reason: SDUPTHER

## 2021-04-22 ENCOUNTER — OFFICE VISIT (OUTPATIENT)
Dept: CARDIOLOGY CLINIC | Facility: CLINIC | Age: 61
End: 2021-04-22
Payer: COMMERCIAL

## 2021-04-22 VITALS
BODY MASS INDEX: 18.43 KG/M2 | OXYGEN SATURATION: 100 % | DIASTOLIC BLOOD PRESSURE: 70 MMHG | HEART RATE: 70 BPM | TEMPERATURE: 97.5 F | WEIGHT: 104 LBS | HEIGHT: 63 IN | SYSTOLIC BLOOD PRESSURE: 146 MMHG | RESPIRATION RATE: 18 BRPM

## 2021-04-22 DIAGNOSIS — E78.2 MIXED HYPERLIPIDEMIA: ICD-10-CM

## 2021-04-22 DIAGNOSIS — I10 BENIGN ESSENTIAL HYPERTENSION: ICD-10-CM

## 2021-04-22 DIAGNOSIS — R00.2 PALPITATIONS: Primary | ICD-10-CM

## 2021-04-22 PROCEDURE — 99214 OFFICE O/P EST MOD 30 MIN: CPT | Performed by: INTERNAL MEDICINE

## 2021-04-22 PROCEDURE — 3008F BODY MASS INDEX DOCD: CPT | Performed by: INTERNAL MEDICINE

## 2021-04-22 PROCEDURE — 93000 ELECTROCARDIOGRAM COMPLETE: CPT | Performed by: INTERNAL MEDICINE

## 2021-04-22 PROCEDURE — 3078F DIAST BP <80 MM HG: CPT | Performed by: INTERNAL MEDICINE

## 2021-04-22 PROCEDURE — 1036F TOBACCO NON-USER: CPT | Performed by: INTERNAL MEDICINE

## 2021-04-22 PROCEDURE — 3077F SYST BP >= 140 MM HG: CPT | Performed by: INTERNAL MEDICINE

## 2021-04-22 RX ORDER — OMEGA-3-ACID ETHYL ESTERS 1 G/1
1 CAPSULE, LIQUID FILLED ORAL 2 TIMES DAILY
Qty: 60 CAPSULE | Refills: 1 | Status: SHIPPED | COMMUNITY
Start: 2021-04-22 | End: 2022-06-27

## 2021-04-22 NOTE — PROGRESS NOTES
[facility]  [address] [facility] [place] [place], [address]  Cardiology Follow-up  Erwin Johnston  [id number]  1960      Consult for:Palpitations  Appreciate consult by: Shivam Field DO    1  Palpitations     2  Benign essential hypertension  POCT ECG    omega-3-acid ethyl esters (LOVAZA) 1 g capsule   3  Mixed hyperlipidemia  POCT ECG    omega-3-acid ethyl esters (LOVAZA) 1 g capsule      Discussion/Summary:   Palpitations- infrequent/montly  No afib/flutter  Normal qtc  Normal resting ekg Continue monitor  Htn- controlled  bystolic 5mg daily + micardis 40mg daily  Hld- pravastatin 10mg  LDL of 78 HDL 51  Target less than 70  Omega 3 2000mg to target improved HDL above 55  Prior hx of mediastinal radiation  Dm2- A1c-5  6  Very well controlled    Rtc- 1 year    HPI:   64 yo with hx hld, prior mediastinal radiation presents for initial visit  Her blood pressures have been well controlled  She has been compliant with a low-dose statin medication  She has no prior history of atrial fibrillation  She reports having very sporadic and seldom palpitations  She denies feeling dizziness or lightheadedness  She had a stress test in 2019 where she was able to reach target and goal   She denies having any recent fevers or chills  Her prior records were reviewed  04/22/2021:  She continues to be very active  She states her blood pressures have been well controlled at home  She periodically has few palpitations  She is currently in normal sinus rhythm  She denies having PND orthopnea  She denies having chest heaviness  Reviewed her last lab work  Her LDL is trending lower      PMH  Breast cancer- R mastectomy 2003-                           2007 lumpectomy- R sided  Last stress test 2019- negative    Social Hx  No smoking hx  Work for [employer]  No snoring    Family Hx  Father- heart disease in 76s    Past Medical History:   Diagnosis Date    Abnormal liver function test     Acute upper respiratory infection 2008    [facility] cancer ([facility] ) 2003    Cancer ([place] [address] )     Cough 2008    Diabetes mellitus ([place] [address] )     History of acute sinusitis 2010    History of diarrhea     History of hypertension     History of motion sickness 2008    History of orthopnea 01/10/2008     Social History     Socioeconomic History    Marital status: /Civil Union     Spouse name: Not on file    Number of children: Not on file    Years of education: Not on file    Highest education level: Not on file   Occupational History    Not on file   Social Needs    Financial resource strain: Not on file    Food insecurity     Worry: Not on file     Inability: Not on file   [employer] needs     Medical: Not on file     Non-medical: Not on file   Tobacco Use    Smoking status: Never Smoker    Smokeless tobacco: Never Used   Substance and Sexual Activity    Alcohol use: Yes     Comment: social drinker    Drug use: No    Sexual activity: Not Currently     Partners: Male   Lifestyle    Physical activity     Days per week: Not on file     Minutes per session: Not on file    Stress: Not on file   Relationships    Social connections     Talks on phone: Not on file     Gets together: Not on file     Attends Sabianism service: Not on file     Active member of club or organization: Not on file     Attends meetings of clubs or organizations: Not on file     Relationship status: Not on file    Intimate partner violence     Fear of current or ex partner: Not on file     Emotionally abused: Not on file     Physically abused: Not on file     Forced sexual activity: Not on file   Other Topics Concern    Not on file   Social History Narrative    Not on file      Family History   Problem Relation Age of Onset    Hypertension Father             Diabetes Family     Heart disease Family     Parkinsonism Mother     Dementia Mother     No Known Problems Sister     No Known Problems Daughter     No Known Problems Daughter     Mental illness Neg Hx      Past Surgical History:   Procedure Laterality Date    BREAST LUMPECTOMY Right 12/07/2007    [place] area   [employer] MASTECTOMY Right 12/01/2003    TOOTH EXTRACTION         Current Outpatient Medications:     Accu-Chek FastClix Lancets MISC, USE TO TEST DAILY, Disp: 102 each, Rfl: 3    Accu-Chek Guide test strip, USE TO TEST DAILY, Disp: 100 each, Rfl: 3    aspirin 81 MG tablet, Take by mouth daily, Disp: , Rfl:     Cholecalciferol (VITAMIN D3) LIQD, , Disp: , Rfl:     fexofenadine (ALLEGRA) 180 MG tablet, Take 360 mg by mouth daily, Disp: , Rfl:     Glucosamine-Chondroitin 250-200 MG TABS, Take by mouth daily , Disp: , Rfl:     Kombiglyze XR 5-1000 MG TB24, TAKE 1 TABLET DAILY, Disp: 90 tablet, Rfl: 0    letrozole (FEMARA) 2 5 mg tablet, Take 1 tablet (2 5 mg total) by mouth daily, Disp: 90 tablet, Rfl: 0    Multiple Vitamins-Iron (QC DAILY MULTIVITAMINS/IRON) TABS, Take by mouth daily, Disp: , Rfl:     nebivolol (Bystolic) 5 mg tablet, Take 1 tablet (5 mg total) by mouth daily, Disp: 90 tablet, Rfl: 0    pravastatin (PRAVACHOL) 10 mg tablet, TAKE 1 TABLET DAILY, Disp: 90 tablet, Rfl: 1    Probiotic Product (PROBIOTIC DAILY) CAPS, Take by mouth daily , Disp: , Rfl:     telmisartan (MICARDIS) 40 mg tablet, TAKE 1 TABLET DAILY, Disp: 90 tablet, Rfl: 0    zinc gluconate 50 mg tablet, Take 50 mg by mouth daily, Disp: , Rfl:     Ferrous Sulfate (IRON) 325 (65 Fe) MG TABS, Take by mouth daily, Disp: , Rfl:   Allergies   Allergen Reactions    Erythromycin Swelling     Reaction Date: 23Aug2007;     Penicillins Hives     Reaction Date: 23Aug2007;     Codeine Nausea Only     Reaction Date: 23Aug2007;     Sulfamethoxazole-Trimethoprim Hives    Sulfate Rash and Fever     Reaction Date: 23Aug2007;      Vitals:    04/22/21 1617   BP: 146/70   BP Location: Left arm   Patient Position: Sitting   Cuff Size: Standard Pulse: 70   Resp: 18   Temp: 97 5 °F (36 4 °C)   TempSrc: Temporal   SpO2: 100%   Weight: 47 2 kg (104 lb)   Height: 5' 3" (1 6 m)       Review of Systems:   Review of Systems   Constitutional: Negative  Negative for activity change, appetite change, chills, diaphoresis, fatigue, fever and unexpected weight change  HENT: Negative  Negative for congestion, dental problem, drooling, ear discharge, ear pain, facial swelling, hearing loss, mouth sores, nosebleeds, postnasal drip, rhinorrhea, sinus pressure, sinus pain, sneezing, sore throat, tinnitus, trouble swallowing and voice change  Eyes: Negative  Negative for photophobia, pain, redness, itching and visual disturbance  Respiratory: Negative  Negative for apnea, cough, choking, chest tightness, shortness of breath, wheezing and stridor  Cardiovascular: Positive for palpitations  Negative for chest pain and leg swelling  Gastrointestinal: Negative  Negative for abdominal distention, abdominal pain, anal bleeding, blood in stool, constipation, diarrhea, nausea, rectal pain and vomiting  Endocrine: Negative  Negative for cold intolerance, heat intolerance, polydipsia, polyphagia and polyuria  Genitourinary: Negative  Negative for decreased urine volume, difficulty urinating, dyspareunia, dysuria, enuresis, flank pain, frequency, genital sores, hematuria, menstrual problem, pelvic pain, urgency, vaginal bleeding, vaginal discharge and vaginal pain  Musculoskeletal: Negative  Negative for arthralgias, back pain, gait problem, joint swelling, myalgias, neck pain and neck stiffness  Skin: Negative  Negative for color change, pallor, rash and wound  Allergic/Immunologic: Negative  Negative for environmental allergies, food allergies and immunocompromised state  Neurological: Negative  Negative for dizziness, tremors, seizures, syncope, facial asymmetry, speech difficulty, weakness, light-headedness, numbness and headaches  Hematological: Negative  Negative for adenopathy  Does not bruise/bleed easily  Psychiatric/Behavioral: Negative  Negative for agitation, behavioral problems, confusion, decreased concentration, dysphoric mood, hallucinations, self-injury, sleep disturbance and suicidal ideas  The patient is not nervous/anxious and is not hyperactive  All other systems reviewed and are negative  Vitals:    04/22/21 1617   BP: 146/70   BP Location: Left arm   Patient Position: Sitting   Cuff Size: Standard   Pulse: 70   Resp: 18   Temp: 97 5 °F (36 4 °C)   TempSrc: Temporal   SpO2: 100%   Weight: 47 2 kg (104 lb)   Height: 5' 3" (1 6 m)     Physical Examination:   Physical Exam  Constitutional:       General: She is not in acute distress  Appearance: She is well-developed  She is not diaphoretic  HENT:      Head: Normocephalic and atraumatic  Right Ear: External ear normal       Left Ear: External ear normal    Eyes:      General: No scleral icterus  Right eye: No discharge  Left eye: No discharge  Conjunctiva/sclera: Conjunctivae normal       Pupils: Pupils are equal, round, and reactive to light  Neck:      Musculoskeletal: Normal range of motion and neck supple  Thyroid: No thyromegaly  Vascular: No JVD  Trachea: No tracheal deviation  Cardiovascular:      Rate and Rhythm: Normal rate and regular rhythm  Heart sounds: No murmur  No friction rub  No gallop  Pulmonary:      Effort: Pulmonary effort is normal  No respiratory distress  Breath sounds: Normal breath sounds  No stridor  No wheezing or rales  Chest:      Chest wall: No tenderness  Abdominal:      General: Bowel sounds are normal  There is no distension  Palpations: Abdomen is soft  There is no mass  Tenderness: There is no abdominal tenderness  There is no guarding or rebound  Musculoskeletal: Normal range of motion  General: No tenderness or deformity     Skin: General: Skin is warm and dry  Coloration: Skin is not pale  Findings: No erythema or rash  Neurological:      Mental Status: She is alert and oriented to person, place, and time  Cranial Nerves: No cranial nerve deficit  Motor: No abnormal muscle tone  Coordination: Coordination normal       Deep Tendon Reflexes: Reflexes are normal and symmetric  Reflexes normal    Psychiatric:         Behavior: Behavior normal          Thought Content: Thought content normal          Judgment: Judgment normal          Labs:     Lab Results   Component Value Date    WBC 9 0 12/12/2020    HGB 13 0 12/12/2020    HCT 40 3 12/12/2020    MCV 83 3 12/12/2020    RDW 14 2 12/12/2020     12/12/2020     BMP:  Lab Results   Component Value Date    SODIUM 137 12/12/2020    K 5 0 12/12/2020     12/12/2020    CO2 30 12/12/2020    BUN 13 12/12/2020    CREATININE 0 91 12/12/2020    GLUC 120 (H) 12/12/2020    GLUF 129 05/21/2016    CALCIUM 9 5 12/12/2020     LFT:  Lab Results   Component Value Date    AST 15 12/12/2020    ALT 10 12/12/2020    ALKPHOS 65 12/12/2020    TP 6 9 12/12/2020    ALB 4 3 12/12/2020      Lab Results   Component Value Date    BFW9JGTZZDON 1 15 02/20/2016     Lab Results   Component Value Date    HGBA1C 5 6 12/12/2020     Lipid Profile:   Lab Results   Component Value Date    CHOLESTEROL 149 12/12/2020    HDL 51 12/12/2020    LDLCALC 78 12/12/2020    TRIG 122 12/12/2020     Lab Results   Component Value Date    CHOLESTEROL 149 12/12/2020    CHOLESTEROL 158 06/20/2020       Imaging & Testing   I have personally reviewed pertinent reports        Cardiac Testing     Results for orders placed during the hospital encounter of 01/26/19   Echo complete with contrast if indicated    Narrative [address]  [address] [facility], [facility]  [phone]    Transthoracic Echocardiogram  2D, M-mode, Doppler, and Color Doppler    Study date:  26-Jan-2019    Patient: FARIDA LUCITA  MR number: [id number]  Account number: [de-identified]  : 1960  Age: 63 years  Gender: Female  Status: Outpatient  Location: Echo lab  Height: 62 in  Weight: 119 7 lb  BP: 166/ 76 mmHg    Indications: Palpitations    Diagnoses: R00 2 - Palpitations    Sonographer:  Tyler Colin RDCS  Primary Physician:  Klaudia Mancia DO  Referring Physician:  Merlinda Keys, MD  Group:  [facility]  Ordering Physician:  Merlinda Keys, MD  Interpreting Physician:  Malinda Simental MD    SUMMARY    LEFT VENTRICLE:  Systolic function was normal by EF (single plane method of disks)  Ejection fraction was estimated in the range of 60 % to 65 % to be 63 %  There were no regional wall motion abnormalities  LEFT ATRIUM:  Size was normal     RIGHT ATRIUM:  Size was normal     HISTORY: PRIOR HISTORY: Hypertension, Breast cancer, Diabetes Mellitus, Hyperlipidemia    PROCEDURE: The procedure was performed in the echo lab  This was a routine study  The transthoracic approach was used  The study included complete 2D imaging, M-mode, complete spectral Doppler, and color Doppler  The heart rate was 96 bpm,  at the start of the study  Image quality was adequate  LEFT VENTRICLE: Size was normal  Systolic function was normal by EF (single plane method of disks)  Ejection fraction was estimated in the range of 60 % to 65 % to be 63 %  There were no regional wall motion abnormalities  Wall thickness  was normal  No evidence of apical thrombus  DOPPLER: Left ventricular diastolic function parameters were normal for the patient's age  RIGHT VENTRICLE: The size was normal  Systolic function was normal  Wall thickness was normal     LEFT ATRIUM: Size was normal     RIGHT ATRIUM: Size was normal     MITRAL VALVE: Valve structure was normal  There was normal leaflet separation  DOPPLER: The transmitral velocity was within the normal range  There was no evidence for stenosis   There was no significant regurgitation  AORTIC VALVE: The valve was trileaflet  Leaflets exhibited mildly increased thickness and normal cuspal separation  DOPPLER: Transaortic velocity was within the normal range  There was no evidence for stenosis  There was no significant  regurgitation  TRICUSPID VALVE: The valve structure was normal  There was normal leaflet separation  DOPPLER: The transtricuspid velocity was within the normal range  There was no evidence for stenosis  There was no significant regurgitation  PULMONIC VALVE: Leaflets exhibited normal thickness, no calcification, and normal cuspal separation  DOPPLER: The transpulmonic velocity was within the normal range  There was trace regurgitation  PERICARDIUM: There was no pericardial effusion  The pericardium was normal in appearance  AORTA: The root exhibited normal size  SYSTEMIC VEINS: IVC: The inferior vena cava was not well visualized  SYSTEM MEASUREMENT TABLES    2D mode  AoR Diam 2D: 2 8 cm  LA Diam (2D): 3 cm  LA/Ao (2D): 1 07  FS (2D Teich): 35 6 %  IVSd (2D): 0 74 cm  LVDEV: 69 6 cm³  LVESV: 23 9 cm³  LVIDd(2D): 3 99 cm  LVISd (2D): 2 57 cm  LVOT Area 2D: 2 54 cm squared  LVPWd (2D): 0 76 cm  SV (Teich): 45 7 cm³    Apical four chamber  LVEF A4C: 63 %    Unspecified Scan Mode  NESS Cont Eq (Peak Miguel): 2 42 cm squared  LVOT Diam : 1 8 cm  LVOT Vmax: 1050 mm/s  LVOT Vmax; Mean: 1050 mm/s  Peak Grad ; Mean: 4 mm[Hg]  MV Peak A Miguel: 982 mm/s  MV Peak E Miguel  Mean: 899 mm/s  MVA (PHT): 4 cm squared  PHT: 55 ms  Max P mm[Hg]  V Max: 2060 mm/s  Vmax: 2060 mm/s  RA Area: 12 cm squared  RA Volume: 27 1 cm³  TAPSE: 2 4 cm    [employer] Accredited Echocardiography Laboratory    Prepared and electronically signed by    Morena Spencer MD  Signed 2019 10:42:27     Last stress test 2 years ago doing 10-12 minutes without having any significant symptoms  EKG: Personally reviewed      Normal sinus rhythm 74bpm no acute st/t wave changes      Marla Shirley  [phone]  Please call with any questions or suggestions    Counseling :  A description of the counseling:   Goals and Barriers:  Patient's ability to self care:  Medication side effect reviewed with patient in detail and all their questions answered  "Portions of the record may have been created with voice recognition software  Occasional wrong word or "sound a like" substitutions may have occurred due to the inherent limitations of voice recognition software  Read the chart carefully and recognize, using context, where substitutions have occurred   Please call if you have any questions  "

## 2021-06-17 ENCOUNTER — DOCUMENTATION (OUTPATIENT)
Dept: HEMATOLOGY ONCOLOGY | Facility: MEDICAL CENTER | Age: 61
End: 2021-06-17

## 2021-06-17 ENCOUNTER — TELEPHONE (OUTPATIENT)
Dept: HEMATOLOGY ONCOLOGY | Facility: CLINIC | Age: 61
End: 2021-06-17

## 2021-06-17 NOTE — PROGRESS NOTES
Left voicemail for patient letting her know that her appointment of 6/28/21 at [address] [facility] has been rescheduled to [facility] [address]   7/29/21 at 9:40am

## 2021-06-17 NOTE — TELEPHONE ENCOUNTER
Reschedule Appointment     Who is calling in  Patient    Doctor Appointment Scheduled with Dr Massey/Madelyn Yang   Original date and time 7-29-21 @ 9:40am    New date and time 7-2-2021 @ 3:00pm   Location [facility]    Patient verbalized understanding

## 2021-06-20 LAB
ALBUMIN SERPL-MCNC: 4.2 G/DL (ref 3.6–5.1)
ALBUMIN/CREAT UR: 4 MCG/MG CREAT
ALBUMIN/GLOB SERPL: 1.6 (CALC) (ref 1–2.5)
ALP SERPL-CCNC: 71 U/L (ref 37–153)
ALT SERPL-CCNC: 13 U/L (ref 6–29)
AST SERPL-CCNC: 17 U/L (ref 10–35)
BASOPHILS # BLD AUTO: 41 CELLS/UL (ref 0–200)
BASOPHILS NFR BLD AUTO: 0.6 %
BILIRUB SERPL-MCNC: 0.4 MG/DL (ref 0.2–1.2)
BUN SERPL-MCNC: 16 MG/DL (ref 7–25)
BUN/CREAT SERPL: ABNORMAL (CALC) (ref 6–22)
CALCIUM SERPL-MCNC: 9.5 MG/DL (ref 8.6–10.4)
CANCER AG125 SERPL-ACNC: 5 U/ML
CANCER AG15-3 SERPL-ACNC: 13 U/ML
CANCER AG27-29 SERPL-ACNC: 27 U/ML
CHLORIDE SERPL-SCNC: 101 MMOL/L (ref 98–110)
CHOLEST SERPL-MCNC: 145 MG/DL
CHOLEST/HDLC SERPL: 2.6 (CALC)
CO2 SERPL-SCNC: 27 MMOL/L (ref 20–32)
CREAT SERPL-MCNC: 1.02 MG/DL (ref 0.5–1.05)
CREAT UR-MCNC: 50 MG/DL (ref 20–275)
EOSINOPHIL # BLD AUTO: 138 CELLS/UL (ref 15–500)
EOSINOPHIL NFR BLD AUTO: 2 %
ERYTHROCYTE [DISTWIDTH] IN BLOOD BY AUTOMATED COUNT: 13.8 % (ref 11–15)
GLOBULIN SER CALC-MCNC: 2.6 G/DL (CALC) (ref 1.9–3.7)
GLUCOSE SERPL-MCNC: 126 MG/DL (ref 65–99)
HBA1C MFR BLD: 5.5 % OF TOTAL HGB
HCT VFR BLD AUTO: 37 % (ref 35–45)
HDLC SERPL-MCNC: 56 MG/DL
HGB BLD-MCNC: 12.4 G/DL (ref 11.7–15.5)
LDLC SERPL CALC-MCNC: 73 MG/DL (CALC)
LYMPHOCYTES # BLD AUTO: 1553 CELLS/UL (ref 850–3900)
LYMPHOCYTES NFR BLD AUTO: 22.5 %
MCH RBC QN AUTO: 27.2 PG (ref 27–33)
MCHC RBC AUTO-ENTMCNC: 33.5 G/DL (ref 32–36)
MCV RBC AUTO: 81.1 FL (ref 80–100)
MICROALBUMIN UR-MCNC: 0.2 MG/DL
MONOCYTES # BLD AUTO: 538 CELLS/UL (ref 200–950)
MONOCYTES NFR BLD AUTO: 7.8 %
NEUTROPHILS # BLD AUTO: 4630 CELLS/UL (ref 1500–7800)
NEUTROPHILS NFR BLD AUTO: 67.1 %
NONHDLC SERPL-MCNC: 89 MG/DL (CALC)
PLATELET # BLD AUTO: 185 THOUSAND/UL (ref 140–400)
PMV BLD REES-ECKER: 9.1 FL (ref 7.5–12.5)
POTASSIUM SERPL-SCNC: 4.9 MMOL/L (ref 3.5–5.3)
PROT SERPL-MCNC: 6.8 G/DL (ref 6.1–8.1)
RBC # BLD AUTO: 4.56 MILLION/UL (ref 3.8–5.1)
SL AMB EGFR AFRICAN AMERICAN: 70 ML/MIN/1.73M2
SL AMB EGFR NON AFRICAN AMERICAN: 60 ML/MIN/1.73M2
SODIUM SERPL-SCNC: 137 MMOL/L (ref 135–146)
TRIGL SERPL-MCNC: 75 MG/DL
WBC # BLD AUTO: 6.9 THOUSAND/UL (ref 3.8–10.8)

## 2021-06-20 PROCEDURE — 3044F HG A1C LEVEL LT 7.0%: CPT | Performed by: INTERNAL MEDICINE

## 2021-06-20 PROCEDURE — 3061F NEG MICROALBUMINURIA REV: CPT | Performed by: INTERNAL MEDICINE

## 2021-06-28 ENCOUNTER — OFFICE VISIT (OUTPATIENT)
Dept: FAMILY MEDICINE CLINIC | Facility: CLINIC | Age: 61
End: 2021-06-28
Payer: COMMERCIAL

## 2021-06-28 VITALS
TEMPERATURE: 98 F | SYSTOLIC BLOOD PRESSURE: 124 MMHG | RESPIRATION RATE: 16 BRPM | HEART RATE: 72 BPM | DIASTOLIC BLOOD PRESSURE: 68 MMHG | HEIGHT: 63 IN | BODY MASS INDEX: 18.07 KG/M2 | WEIGHT: 102 LBS

## 2021-06-28 DIAGNOSIS — I10 BENIGN ESSENTIAL HYPERTENSION: ICD-10-CM

## 2021-06-28 DIAGNOSIS — E11.3293 TYPE 2 DIABETES MELLITUS WITH BOTH EYES AFFECTED BY MILD NONPROLIFERATIVE RETINOPATHY WITHOUT MACULAR EDEMA, WITHOUT LONG-TERM CURRENT USE OF INSULIN (HCC): Primary | ICD-10-CM

## 2021-06-28 DIAGNOSIS — E78.2 MIXED HYPERLIPIDEMIA: ICD-10-CM

## 2021-06-28 PROCEDURE — 99214 OFFICE O/P EST MOD 30 MIN: CPT | Performed by: FAMILY MEDICINE

## 2021-06-28 PROCEDURE — 4010F ACE/ARB THERAPY RXD/TAKEN: CPT | Performed by: PHYSICIAN ASSISTANT

## 2021-06-28 PROCEDURE — 3725F SCREEN DEPRESSION PERFORMED: CPT | Performed by: FAMILY MEDICINE

## 2021-06-28 RX ORDER — TELMISARTAN 40 MG/1
40 TABLET ORAL DAILY
Qty: 90 TABLET | Refills: 3 | Status: SHIPPED | OUTPATIENT
Start: 2021-06-28 | End: 2022-05-31

## 2021-06-28 RX ORDER — PRAVASTATIN SODIUM 10 MG
10 TABLET ORAL DAILY
Qty: 90 TABLET | Refills: 3 | Status: SHIPPED | OUTPATIENT
Start: 2021-06-28 | End: 2022-05-31

## 2021-06-28 RX ORDER — NEBIVOLOL 5 MG/1
5 TABLET ORAL DAILY
Qty: 90 TABLET | Refills: 3 | Status: SHIPPED | OUTPATIENT
Start: 2021-06-28 | End: 2022-03-02

## 2021-06-28 RX ORDER — SAXAGLIPTIN AND METFORMIN HYDROCHLORIDE 5; 1000 MG/1; MG/1
1 TABLET, FILM COATED, EXTENDED RELEASE ORAL DAILY
Qty: 90 TABLET | Refills: 3 | Status: SHIPPED | OUTPATIENT
Start: 2021-06-28 | End: 2022-05-31

## 2021-06-28 NOTE — PROGRESS NOTES
Assessment/Plan:    1  Type 2 diabetes mellitus with both eyes affected by mild nonproliferative retinopathy without macular edema, without long-term current use of insulin (HCC)  -     sAXagliptin-metFORMIN ER (Kombiglyze XR) 5-1000 MG TB24; Take 1 tablet by mouth daily  -     Hemoglobin A1C; Future; Expected date: 12/10/2021  -     Microalbumin / creatinine urine ratio; Future; Expected date: 12/10/2021    2  Benign essential hypertension  -     telmisartan (MICARDIS) 40 mg tablet; Take 1 tablet (40 mg total) by mouth daily  -     nebivolol (Bystolic) 5 mg tablet; Take 1 tablet (5 mg total) by mouth daily  -     Comprehensive metabolic panel; Future; Expected date: 12/10/2021    3  Mixed hyperlipidemia  -     pravastatin (PRAVACHOL) 10 mg tablet; Take 1 tablet (10 mg total) by mouth daily  -     Lipid Panel with Direct LDL reflex; Future; Expected date: 12/10/2021            There are no Patient Instructions on file for this visit  Return in about 6 months (around 12/28/2021) for Recheck  Subjective:      Patient ID: Marion Hood is a 62 y o  female  Chief Complaint   Patient presents with    Follow-up     Diabetes follow up Virgil Childers       Pt is here for a 6 month follow up  Feels well no CP no SOB    Hypertension - patient has been taking her blood pressure medication regularly  Associated symptoms:  Swelling:  no  Leg cramps: no    Diabetes:  Taking medications regularly - yes no: yes  Polyuria- yes no: no  Polydypsia-yes no: no  Hypoglycemia -yes no: no  Chest pain- yes no: no  Shortness of breath- yes no: no    Hyperlipidemia-  she  has been taking their cholesterol medication regularly  Associated symptoms:  Myalgias: no      The following portions of the patient's history were reviewed and updated as appropriate: allergies, current medications, past family history, past medical history, past social history, past surgical history and problem list     Review of Systems   Constitutional: Negative  Negative for activity change, appetite change, chills, diaphoresis and fatigue  HENT: Negative  Negative for dental problem, ear pain, sinus pressure and sore throat  Eyes: Negative  Negative for photophobia, pain, discharge, redness, itching and visual disturbance  Respiratory: Negative for apnea and chest tightness  Cardiovascular: Negative  Negative for chest pain, palpitations and leg swelling  Gastrointestinal: Negative  Negative for abdominal distention, abdominal pain, constipation and diarrhea  Endocrine: Negative  Negative for cold intolerance and heat intolerance  Genitourinary: Negative  Negative for difficulty urinating and dyspareunia  Musculoskeletal: Negative  Negative for arthralgias and back pain  Skin: Negative  Allergic/Immunologic: Negative for environmental allergies  Neurological: Negative  Negative for dizziness  Psychiatric/Behavioral: Negative  Negative for agitation           Current Outpatient Medications   Medication Sig Dispense Refill    Accu-Chek FastClix Lancets MISC USE TO TEST DAILY 102 each 3    Accu-Chek Guide test strip USE TO TEST DAILY 100 each 3    aspirin 81 MG tablet Take by mouth daily      fexofenadine (ALLEGRA) 180 MG tablet Take 360 mg by mouth daily      Glucosamine-Chondroitin 250-200 MG TABS Take by mouth daily       letrozole (FEMARA) 2 5 mg tablet Take 1 tablet (2 5 mg total) by mouth daily 90 tablet 0    Multiple Vitamins-Iron (QC DAILY MULTIVITAMINS/IRON) TABS Take by mouth daily      nebivolol (Bystolic) 5 mg tablet Take 1 tablet (5 mg total) by mouth daily 90 tablet 3    pravastatin (PRAVACHOL) 10 mg tablet Take 1 tablet (10 mg total) by mouth daily 90 tablet 3    Probiotic Product (PROBIOTIC DAILY) CAPS Take by mouth daily       sAXagliptin-metFORMIN ER (Kombiglyze XR) 5-1000 MG TB24 Take 1 tablet by mouth daily 90 tablet 3    telmisartan (MICARDIS) 40 mg tablet Take 1 tablet (40 mg total) by mouth daily 90 tablet 3    zinc gluconate 50 mg tablet Take 50 mg by mouth daily      Cholecalciferol (VITAMIN D3) LIQD       Ferrous Sulfate (IRON) 325 (65 Fe) MG TABS Take by mouth daily      omega-3-acid ethyl esters (LOVAZA) 1 g capsule Take 1 capsule (1 g total) by mouth 2 (two) times a day 60 capsule 1     No current facility-administered medications for this visit  Objective:    /68   Pulse 72   Temp 98 °F (36 7 °C)   Resp 16   Ht 5' 3" (1 6 m)   Wt 46 3 kg (102 lb)   BMI 18 07 kg/m²        Physical Exam  Vitals and nursing note reviewed  Constitutional:       General: She is not in acute distress  Appearance: She is well-developed  She is not diaphoretic  HENT:      Head: Normocephalic and atraumatic  Right Ear: External ear normal       Left Ear: External ear normal       Nose: Nose normal       Mouth/Throat:      Pharynx: No oropharyngeal exudate  Eyes:      General: No scleral icterus  Right eye: No discharge  Left eye: No discharge  Pupils: Pupils are equal, round, and reactive to light  Neck:      Thyroid: No thyromegaly  Cardiovascular:      Rate and Rhythm: Normal rate  Pulses: no weak pulses          Dorsalis pedis pulses are 2+ on the right side and 2+ on the left side  Posterior tibial pulses are 2+ on the right side and 2+ on the left side  Heart sounds: Normal heart sounds  No murmur heard  Pulmonary:      Effort: Pulmonary effort is normal  No respiratory distress  Breath sounds: Normal breath sounds  No wheezing  Abdominal:      General: Bowel sounds are normal  There is no distension  Palpations: Abdomen is soft  There is no mass  Tenderness: There is no abdominal tenderness  There is no guarding or rebound  Musculoskeletal:         General: Normal range of motion  Feet:      Right foot:      Skin integrity: No ulcer, skin breakdown, erythema, warmth, callus or dry skin        Left foot:      Skin integrity: No ulcer, skin breakdown, erythema, warmth, callus or dry skin  Skin:     General: Skin is warm and dry  Findings: No erythema or rash  Neurological:      Mental Status: She is alert        Coordination: Coordination normal       Deep Tendon Reflexes: Reflexes normal    Psychiatric:         Behavior: Behavior normal               Recent Results (from the past 672 hour(s))   Comprehensive metabolic panel    Collection Time: 06/19/21  7:13 AM   Result Value Ref Range    Glucose, Random 126 (H) 65 - 99 mg/dL    BUN 16 7 - 25 mg/dL    Creatinine 1 02 0 50 - 1 05 mg/dL    eGFR Non  60 > OR = 60 mL/min/1 73m2    eGFR  70 > OR = 60 mL/min/1 73m2    SL AMB BUN/CREATININE RATIO NOT APPLICABLE 6 - 22 (calc)    Sodium 137 135 - 146 mmol/L    Potassium 4 9 3 5 - 5 3 mmol/L    Chloride 101 98 - 110 mmol/L    CO2 27 20 - 32 mmol/L    Calcium 9 5 8 6 - 10 4 mg/dL    Protein, Total 6 8 6 1 - 8 1 g/dL    Albumin 4 2 3 6 - 5 1 g/dL    Globulin 2 6 1 9 - 3 7 g/dL (calc)    Albumin/Globulin Ratio 1 6 1 0 - 2 5 (calc)    TOTAL BILIRUBIN 0 4 0 2 - 1 2 mg/dL    Alkaline Phosphatase 71 37 - 153 U/L    AST 17 10 - 35 U/L    ALT 13 6 - 29 U/L   CBC and differential    Collection Time: 06/19/21  7:13 AM   Result Value Ref Range    White Blood Cell Count 6 9 3 8 - 10 8 Thousand/uL    Red Blood Cell Count 4 56 3 80 - 5 10 Million/uL    Hemoglobin 12 4 11 7 - 15 5 g/dL    HCT 37 0 35 0 - 45 0 %    MCV 81 1 80 0 - 100 0 fL    MCH 27 2 27 0 - 33 0 pg    MCHC 33 5 32 0 - 36 0 g/dL    RDW 13 8 11 0 - 15 0 %    Platelet Count 585 624 - 400 Thousand/uL    SL AMB MPV 9 1 7 5 - 12 5 fL    Neutrophils (Absolute) 4,630 1,500 - 7,800 cells/uL    Lymphocytes (Absolute) 1,553 850 - 3,900 cells/uL    Monocytes (Absolute) 538 200 - 950 cells/uL    Eosinophils (Absolute) 138 15 - 500 cells/uL    Basophils ABS 41 0 - 200 cells/uL    Neutrophils 67 1 %    Lymphocytes 22 5 %    Monocytes 7 8 %    Eosinophils 2 0 %    Basophils PCT 0 6 %   Cancer antigen 15-3    Collection Time: 06/19/21  7:13 AM   Result Value Ref Range    CA 15-3 13 <32 U/mL       Collection Time: 06/19/21  7:13 AM   Result Value Ref Range    Cancer Antigen (CA) 125 5 <35 U/mL   Cancer antigen 27 29    Collection Time: 06/19/21  7:13 AM   Result Value Ref Range    CA 27 29 27 <38 U/mL   Lipid Panel with Direct LDL reflex    Collection Time: 06/19/21  7:17 AM   Result Value Ref Range    Total Cholesterol 145 <200 mg/dL    HDL 56 > OR = 50 mg/dL    Triglycerides 75 <150 mg/dL    LDL Calculated 73 mg/dL (calc)    Chol HDLC Ratio 2 6 <5 0 (calc)    Non-HDL Cholesterol 89 <130 mg/dL (calc)   Microalbumin, Random Urine (W/Creatinine)    Collection Time: 06/19/21  7:17 AM   Result Value Ref Range    Creatinine, Urine 50 20 - 275 mg/dL    Microalbum  ,U,Random 0 2 See Note: mg/dL    Microalb/Creat Ratio 4 <30 mcg/mg creat   Hemoglobin A1c (w/out EAG)    Collection Time: 06/19/21  7:17 AM   Result Value Ref Range    Hemoglobin A1C 5 5 <5 7 % of total Hgb       Diabetic Foot Exam    Patient's shoes and socks removed  Right Foot/Ankle   Right Foot Inspection  Skin Exam: skin normal skin not intact, no dry skin, no warmth, no callus, no erythema, no maceration, no abnormal color, no pre-ulcer, no ulcer and no callus                          Toe Exam: ROM and strength within normal limits  Sensory   Vibration: intact  Proprioception: intact   Monofilament testing: intact  Vascular  Capillary refills: < 3 seconds  The right DP pulse is 2+  The right PT pulse is 2+  Left Foot/Ankle  Left Foot Inspection  Skin Exam: skin normalskin not intact, no dry skin, no warmth, no erythema, no maceration, normal color, no pre-ulcer, no ulcer and no callus                         Toe Exam: ROM and strength within normal limits                   Sensory   Vibration: intact  Proprioception: intact  Monofilament: intact  Vascular  Capillary refills: < 3 seconds  The left DP pulse is 2+   The left PT pulse is 2+  Assign Risk Category:  No deformity present; No loss of protective sensation;  No weak pulses       Risk: 0        Saidae Dash, DO

## 2021-07-02 ENCOUNTER — OFFICE VISIT (OUTPATIENT)
Dept: HEMATOLOGY ONCOLOGY | Facility: MEDICAL CENTER | Age: 61
End: 2021-07-02
Payer: COMMERCIAL

## 2021-07-02 VITALS
OXYGEN SATURATION: 100 % | SYSTOLIC BLOOD PRESSURE: 123 MMHG | HEIGHT: 63 IN | DIASTOLIC BLOOD PRESSURE: 80 MMHG | TEMPERATURE: 98.4 F | WEIGHT: 102.4 LBS | HEART RATE: 67 BPM | BODY MASS INDEX: 18.14 KG/M2 | RESPIRATION RATE: 16 BRPM

## 2021-07-02 DIAGNOSIS — C50.919 MALIGNANT NEOPLASM OF BREAST IN FEMALE, ESTROGEN RECEPTOR POSITIVE, UNSPECIFIED LATERALITY, UNSPECIFIED SITE OF BREAST (HCC): ICD-10-CM

## 2021-07-02 DIAGNOSIS — C50.919 MALIGNANT NEOPLASM OF BREAST IN FEMALE, ESTROGEN RECEPTOR POSITIVE, UNSPECIFIED LATERALITY, UNSPECIFIED SITE OF BREAST (HCC): Primary | ICD-10-CM

## 2021-07-02 DIAGNOSIS — Z17.0 MALIGNANT NEOPLASM OF BREAST IN FEMALE, ESTROGEN RECEPTOR POSITIVE, UNSPECIFIED LATERALITY, UNSPECIFIED SITE OF BREAST (HCC): ICD-10-CM

## 2021-07-02 DIAGNOSIS — Z17.0 MALIGNANT NEOPLASM OF BREAST IN FEMALE, ESTROGEN RECEPTOR POSITIVE, UNSPECIFIED LATERALITY, UNSPECIFIED SITE OF BREAST (HCC): Primary | ICD-10-CM

## 2021-07-02 PROCEDURE — 99214 OFFICE O/P EST MOD 30 MIN: CPT | Performed by: PHYSICIAN ASSISTANT

## 2021-07-02 PROCEDURE — 3074F SYST BP LT 130 MM HG: CPT | Performed by: PHYSICIAN ASSISTANT

## 2021-07-02 PROCEDURE — 1036F TOBACCO NON-USER: CPT | Performed by: PHYSICIAN ASSISTANT

## 2021-07-02 PROCEDURE — 3008F BODY MASS INDEX DOCD: CPT | Performed by: PHYSICIAN ASSISTANT

## 2021-07-02 PROCEDURE — 3079F DIAST BP 80-89 MM HG: CPT | Performed by: PHYSICIAN ASSISTANT

## 2021-07-02 RX ORDER — LETROZOLE 2.5 MG/1
2.5 TABLET, FILM COATED ORAL DAILY
Qty: 90 TABLET | Refills: 3 | Status: SHIPPED | OUTPATIENT
Start: 2021-07-02 | End: 2022-06-21 | Stop reason: SDUPTHER

## 2021-07-02 NOTE — PROGRESS NOTES
[facility] [address]  [place] [address]  Progress Note  Saida Nelson, 1960, [id number]  7/2/2021    Assessment/Plan:  1  Malignant neoplasm of breast in female, estrogen receptor positive, unspecified laterality, unspecified site of breast [facility])  This is a 78-year-old female with history of recurrent right breast cancer on Femara  Patient had previously been seen a specialist at [address] [place] [address] who advised to continue on Femara indefinitely  Patient was given one full year supply of Femara and will still follow up in the office every six months with blood work prior  Patient is up-to-date on yearly mammograms, DEXA scans and gyn evaluation  Patient is compliant with monthly self-breast exams  Patient does not have any other questions or concerns regarding her treatment  Patient was advised to continue on medication  Regimen:  Femara 2 5 mg PO daily    - Cancer antigen 15-3; Future  - Cancer antigen 27 29; Future  - ; Future  - CBC and differential; Future  - Comprehensive metabolic panel; Future      The patient is scheduled for follow-up in approximately 6 months with Dr Beasley Ranson  Patient voiced agreement and understanding to the above  Patient knows to call the Hematology/Oncology office with any questions and concerns regarding the above  Goals and Barriers:    Current Goal:   Prolong Survival from Cancer  Barriers: None  Patient's Capacity to Self Care:  Patient able to self care   -------------------------------------------------------------------------------------------------------    Chief Complaint   Patient presents with    Follow-up     History of present illness/Cancer History: This is a 78-year-old female with prior history of breast cancer with two recurrences    See below information as copied from Dr Anitra Rangel, 12/21/20 note    "62 female with a complicated past breast cancer history back for follow-up  Patient was first diagnosed in 2003 and underwent a right modified radical mastectomy in December 2003  Pathology results from [facility] in [address] [place] [address] demonstrated invasive ductal carcinoma, 0 5 cm with focal lymphovascular invasion  There was a second right breast mass also invasive, 4 0 by 2 5 cm, moderately differentiated  Patient also had focal DCIS, margins were free of tumor  Right axillary contents demonstrated 4/17 positive lymph nodes  Final stage was IIIA (pT2 pN2a M0)  Patient states that she refused adjuvant chemotherapy at that time  It is never been clear why patient was not offered adjuvant hormonal manipulation       Subsequently patient moved to [place] and in 2007 began to have pain in her right shoulder  Workup demonstrated 2 nodules within the shoulder muscle  One nodule was removed and demonstrated metastatic carcinoma  Patient underwent radiotherapy without complications and was placed on tamoxifen - completed 5 years (started in January 2008)  Patient subsequently began Femara and has completed 6+ years of Femara  "    Interval history:  No major interval changes  Patient is up-to-date on mammogram DEXA scan and gynecologic appointment  Patient is due for repeat gynecologic assessment in September, mammography in November and DEXA scan in late December or early January 2022  Patient is doing well, other disease states are well controlled including diabetes  No changes in appetite, fatigue  Patient is compliant with self breast exam, no concerning areas  Review of Systems   Constitutional: Negative for appetite change, fatigue, fever and unexpected weight change  HENT: Negative for nosebleeds  Respiratory: Negative for cough, choking and shortness of breath  Negative hemoptysis  Cardiovascular: Negative for chest pain, palpitations and leg swelling  Gastrointestinal: Negative    Negative for abdominal distention, abdominal pain, anal bleeding, blood in stool, constipation, diarrhea, nausea and vomiting  Endocrine: Negative  Negative for cold intolerance  Genitourinary: Negative  Negative for hematuria, menstrual problem, vaginal bleeding, vaginal discharge and vaginal pain  Musculoskeletal: Negative  Negative for arthralgias, myalgias, neck pain and neck stiffness  Skin: Negative  Negative for color change, pallor and rash  Allergic/Immunologic: Negative  Negative for immunocompromised state  Neurological: Negative  Negative for weakness and headaches  Hematological: Negative for adenopathy  Does not bruise/bleed easily  All other systems reviewed and are negative          Current Outpatient Medications:     Accu-Chek FastClix Lancets MISC, USE TO TEST DAILY, Disp: 102 each, Rfl: 3    Accu-Chek Guide test strip, USE TO TEST DAILY, Disp: 100 each, Rfl: 3    aspirin 81 MG tablet, Take by mouth daily, Disp: , Rfl:     fexofenadine (ALLEGRA) 180 MG tablet, Take 360 mg by mouth daily, Disp: , Rfl:     Glucosamine-Chondroitin 250-200 MG TABS, Take by mouth daily , Disp: , Rfl:     Multiple Vitamins-Iron (QC DAILY MULTIVITAMINS/IRON) TABS, Take by mouth daily, Disp: , Rfl:     nebivolol (Bystolic) 5 mg tablet, Take 1 tablet (5 mg total) by mouth daily, Disp: 90 tablet, Rfl: 3    pravastatin (PRAVACHOL) 10 mg tablet, Take 1 tablet (10 mg total) by mouth daily, Disp: 90 tablet, Rfl: 3    Probiotic Product (PROBIOTIC DAILY) CAPS, Take by mouth daily , Disp: , Rfl:     sAXagliptin-metFORMIN ER (Kombiglyze XR) 5-1000 MG TB24, Take 1 tablet by mouth daily, Disp: 90 tablet, Rfl: 3    telmisartan (MICARDIS) 40 mg tablet, Take 1 tablet (40 mg total) by mouth daily, Disp: 90 tablet, Rfl: 3    zinc gluconate 50 mg tablet, Take 50 mg by mouth daily, Disp: , Rfl:     Cholecalciferol (VITAMIN D3) LIQD, , Disp: , Rfl:     Ferrous Sulfate (IRON) 325 (65 Fe) MG TABS, Take by mouth daily, Disp: , Rfl:   letrozole (FEMARA) 2 5 mg tablet, Take 1 tablet (2 5 mg total) by mouth daily, Disp: 90 tablet, Rfl: 3    omega-3-acid ethyl esters (LOVAZA) 1 g capsule, Take 1 capsule (1 g total) by mouth 2 (two) times a day, Disp: 60 capsule, Rfl: 1    Allergies   Allergen Reactions    Erythromycin Swelling     Reaction Date: 23Aug2007;     Penicillins Hives     Reaction Date: 23Aug2007;     Codeine Nausea Only     Reaction Date: 23Aug2007;     Sulfamethoxazole-Trimethoprim Hives    Sulfate Rash and Fever     Reaction Date: 23Aug2007; Advance Directive and Living Will:        Objective:   /80 (BP Location: Left arm, Patient Position: Sitting, Cuff Size: Adult)   Pulse 67   Temp 98 4 °F (36 9 °C)   Resp 16   Ht 5' 3" (1 6 m)   Wt 46 4 kg (102 lb 6 4 oz)   SpO2 100%   BMI 18 14 kg/m²   Wt Readings from Last 6 Encounters:   07/02/21 46 4 kg (102 lb 6 4 oz)   06/28/21 46 3 kg (102 lb)   04/22/21 47 2 kg (104 lb)   12/21/20 44 9 kg (99 lb)   12/21/20 45 2 kg (99 lb 9 6 oz)   11/13/20 46 7 kg (103 lb)       Physical Exam  Constitutional:       General: She is not in acute distress  Appearance: She is well-developed  HENT:      Head: Normocephalic and atraumatic  Mouth/Throat:      Pharynx: No oropharyngeal exudate  Eyes:      General: No scleral icterus  Pupils: Pupils are equal, round, and reactive to light  Cardiovascular:      Rate and Rhythm: Normal rate and regular rhythm  Heart sounds: No murmur heard  Pulmonary:      Effort: Pulmonary effort is normal  No respiratory distress  Breath sounds: Normal breath sounds  Abdominal:      General: Bowel sounds are normal  There is no distension  Palpations: Abdomen is soft  Tenderness: There is no abdominal tenderness  Musculoskeletal:         General: Normal range of motion  Cervical back: Normal range of motion  Lymphadenopathy:      Cervical: No cervical adenopathy  Skin:     General: Skin is warm  Coloration: Skin is not pale  Findings: No rash  Neurological:      Mental Status: She is alert and oriented to person, place, and time  Cranial Nerves: No cranial nerve deficit  Psychiatric:         Thought Content: Thought content normal          Pertinent Laboratory Results and Imaging Review:  Appointment on 06/28/2021   Component Date Value Ref Range Status    Right Eye Diabetic Retinopathy 09/08/2020 Mild   Final    Left Eye Diabetic Retinopathy 09/08/2020 Mild   Final   Orders Only on 06/19/2021   Component Date Value Ref Range Status    Total Cholesterol 06/19/2021 145  <200 mg/dL Final    HDL 06/19/2021 56  > OR = 50 mg/dL Final    Triglycerides 06/19/2021 75  <150 mg/dL Final    LDL Calculated 06/19/2021 73  mg/dL (calc) Final    Comment: Reference range: <100     Desirable range <100 mg/dL for primary prevention;    <70 mg/dL for patients with CHD or diabetic patients   with > or = 2 CHD risk factors  LDL-C is now calculated using the Ty-Caicedo   calculation, which is a validated novel method providing   better accuracy than the Friedewald equation in the   estimation of LDL-C  Dawna Harris et al  Tenna Artist  9108;154(29): 3422-7505   (http://[employer]/faq/HLC667)      Chol HDLC Ratio 06/19/2021 2 6  <5 0 (calc) Final    Non-HDL Cholesterol 06/19/2021 89  <130 mg/dL (calc) Final    Comment: For patients with diabetes plus 1 major ASCVD risk   factor, treating to a non-HDL-C goal of <100 mg/dL   (LDL-C of <70 mg/dL) is considered a therapeutic   option   Creatinine, Urine 06/19/2021 50  20 - 275 mg/dL Final    Microalbum  ,U,Random 06/19/2021 0 2  See Note: mg/dL Final    Comment: Reference Range:    Reference Range  Not established      Microalb/Creat Ratio 06/19/2021 4  <30 mcg/mg creat Final    Comment:    The ADA defines abnormalities in albumin  excretion as follows:     Category         Result (mcg/mg creatinine)     Normal <30  Microalbuminuria            Clinical albuminuria   > OR = 300     The ADA recommends that at least two of three  specimens collected within a 3-6 month period be  abnormal before considering a patient to be  within a diagnostic category   Hemoglobin A1C 06/19/2021 5 5  <5 7 % of total Hgb Final    Comment: For the purpose of screening for the presence of  diabetes:     <5 7%       Consistent with the absence of diabetes  5 7-6 4%    Consistent with increased risk for diabetes              (prediabetes)  > or =6 5%  Consistent with diabetes     This assay result is consistent with a decreased risk  of diabetes  Currently, no consensus exists regarding use of  hemoglobin A1c for diagnosis of diabetes in children  According to American Diabetes Association (ADA)  guidelines, hemoglobin A1c <7 0% represents optimal  control in non-pregnant diabetic patients  Different  metrics may apply to specific patient populations  Standards of Medical Care in Diabetes(ADA)  Orders Only on 06/19/2021   Component Date Value Ref Range Status    Glucose, Random 06/19/2021 126* 65 - 99 mg/dL Final    Comment:               Fasting reference interval     For someone without known diabetes, a glucose  value >125 mg/dL indicates that they may have  diabetes and this should be confirmed with a  follow-up test          BUN 06/19/2021 16  7 - 25 mg/dL Final    Creatinine 06/19/2021 1 02  0 50 - 1 05 mg/dL Final    Comment: For patients >52years of age, the reference limit  for Creatinine is approximately 13% higher for people  identified as -American           eGFR Non  06/19/2021 60  > OR = 60 mL/min/1 73m2 Final    eGFR  06/19/2021 70  > OR = 60 mL/min/1 73m2 Final    SL AMB BUN/CREATININE RATIO 86/60/2296 NOT APPLICABLE  6 - 22 (calc) Final    Sodium 06/19/2021 137  135 - 146 mmol/L Final    Potassium 06/19/2021 4 9  3 5 - 5 3 mmol/L Final    Chloride 06/19/2021 101  98 - 110 mmol/L Final    CO2 06/19/2021 27  20 - 32 mmol/L Final    Calcium 06/19/2021 9 5  8 6 - 10 4 mg/dL Final    Protein, Total 06/19/2021 6 8  6 1 - 8 1 g/dL Final    Albumin 06/19/2021 4 2  3 6 - 5 1 g/dL Final    Globulin 06/19/2021 2 6  1 9 - 3 7 g/dL (calc) Final    Albumin/Globulin Ratio 06/19/2021 1 6  1 0 - 2 5 (calc) Final    TOTAL BILIRUBIN 06/19/2021 0 4  0 2 - 1 2 mg/dL Final    Alkaline Phosphatase 06/19/2021 71  37 - 153 U/L Final    AST 06/19/2021 17  10 - 35 U/L Final    ALT 06/19/2021 13  6 - 29 U/L Final    White Blood Cell Count 06/19/2021 6 9  3 8 - 10 8 Thousand/uL Final    Red Blood Cell Count 06/19/2021 4 56  3 80 - 5 10 Million/uL Final    Hemoglobin 06/19/2021 12 4  11 7 - 15 5 g/dL Final    HCT 06/19/2021 37 0  35 0 - 45 0 % Final    MCV 06/19/2021 81 1  80 0 - 100 0 fL Final    MCH 06/19/2021 27 2  27 0 - 33 0 pg Final    MCHC 06/19/2021 33 5  32 0 - 36 0 g/dL Final    RDW 06/19/2021 13 8  11 0 - 15 0 % Final    Platelet Count 46/05/5331 185  140 - 400 Thousand/uL Final    SL AMB MPV 06/19/2021 9 1  7 5 - 12 5 fL Final    Neutrophils (Absolute) 06/19/2021 4,630  1,500 - 7,800 cells/uL Final    Lymphocytes (Absolute) 06/19/2021 1,553  850 - 3,900 cells/uL Final    Monocytes (Absolute) 06/19/2021 538  200 - 950 cells/uL Final    Eosinophils (Absolute) 06/19/2021 138  15 - 500 cells/uL Final    Basophils ABS 06/19/2021 41  0 - 200 cells/uL Final    Neutrophils 06/19/2021 67 1  % Final    Lymphocytes 06/19/2021 22 5  % Final    Monocytes 06/19/2021 7 8  % Final    Eosinophils 06/19/2021 2 0  % Final    Basophils PCT 06/19/2021 0 6  % Final    CA 15-3 06/19/2021 13  <32 U/mL Final    Comment: This test was performed using the [employer])  chemiluminescent method  Values obtained from  different assay methods cannot be used  interchangeably   CA 15-3 levels, regardless of  value, should not be interpreted as absolute  evidence of the presence or absence of disease   Cancer Antigen (CA) 125 06/19/2021 5  <35 U/mL Final    Comment: This test was performed using the [employer] [place]  Chemiluminescent method  Values obtained from  different assay methods cannot be used  interchangeably   levels, regardless of  value, should not be interpreted as absolute  evidence of the presence or absence of disease   CA 27 29 06/19/2021 27  <38 U/mL Final    Comment: This test was performed using the Siemens  Chemiluminescent method  Values obtained from  different assay methods cannot be used  interchangeably  CA 27 29 levels, regardless of  value, should not be interpreted as absolute  evidence of the presence or absence of disease  The following historical data was reviewed      Past Medical History:   Diagnosis Date    Abnormal liver function test     Acute upper respiratory infection 04/17/2008    [facility] 12/01/2003    Cancer ([facility] )     Cough 08/07/2008    Diabetes mellitus ([facility] )     History of acute sinusitis 09/28/2010    History of diarrhea     History of hypertension     History of motion sickness 08/07/2008    History of orthopnea 01/10/2008       Past Surgical History:   Procedure Laterality Date    [place] LUM[place]Y [place]07[place]   Deadra Wolfgang MASTECTOMY Right 12/01/2003    TOOTH EXTRACTION         Social History     Socioeconomic History    Marital status: /Civil Union     Spouse name: None    Number of children: None    Years of education: None    Highest education level: None   Occupational History    None   Tobacco Use    Smoking status: Never Smoker    Smokeless tobacco: Never Used   Substance and Sexual Activity    Alcohol use: Yes     Comment: social drinker    Drug use: No    Sexual activity: Not Currently     Partners: Male   Other Topics Concern    None   Social History Narrative    None     Social Determinants of Health     Financial Resource Strain:     Difficulty of Paying Living Expenses:    Food Insecurity:     Worried About Running Out of Food in the Last Year:     [address] in the Last Year:    Transportation Needs:     Lack of Transportation (Medical):  Lack of Transportation (Non-Medical):    Physical Activity:     Days of Exercise per Week:     Minutes of Exercise per Session:    Stress:     Feeling of Stress :    Social Connections:     Frequency of Communication with Friends and Family:     Frequency of Social Gatherings with Friends and Family:     Attends Rastafari Services:     Active Member of Clubs or Organizations:     Attends Club or Organization Meetings:     Marital Status:    Intimate Partner Violence:     Fear of Current or Ex-Partner:     Emotionally Abused:     Physically Abused:     Sexually Abused:        Family History   Problem Relation Age of Onset    Hypertension Father             Diabetes Family     Heart disease Family     Parkinsonism Mother     Dementia Mother     No Known Problems Sister     No Known Problems Daughter     No Known Problems Daughter     Mental illness Neg Hx        Please note: This report has been generated by a voice recognition software system  Therefore there may be syntax, spelling, and/or grammatical errors  Please call if you have any questions

## 2021-09-10 DIAGNOSIS — E11.3293 TYPE 2 DIABETES MELLITUS WITH BOTH EYES AFFECTED BY MILD NONPROLIFERATIVE RETINOPATHY WITHOUT MACULAR EDEMA, WITHOUT LONG-TERM CURRENT USE OF INSULIN (HCC): ICD-10-CM

## 2021-09-10 RX ORDER — BLOOD SUGAR DIAGNOSTIC
STRIP MISCELLANEOUS
Qty: 100 STRIP | Refills: 3 | Status: SHIPPED | OUTPATIENT
Start: 2021-09-10

## 2021-09-10 RX ORDER — LANCETS
EACH MISCELLANEOUS
Qty: 102 EACH | Refills: 3 | Status: SHIPPED | OUTPATIENT
Start: 2021-09-10

## 2021-09-21 ENCOUNTER — VBI (OUTPATIENT)
Dept: ADMINISTRATIVE | Facility: OTHER | Age: 61
End: 2021-09-21

## 2021-09-21 NOTE — TELEPHONE ENCOUNTER
09/21/21 11:30 AM     See documentation in the [employer] SmartForm       Shonda Yeboah MA   Patient needs DM Eye Exam for 2021

## 2021-09-30 ENCOUNTER — ANNUAL EXAM (OUTPATIENT)
Dept: OBGYN CLINIC | Facility: CLINIC | Age: 61
End: 2021-09-30
Payer: COMMERCIAL

## 2021-09-30 VITALS — WEIGHT: 100 LBS | BODY MASS INDEX: 17.71 KG/M2 | DIASTOLIC BLOOD PRESSURE: 60 MMHG | SYSTOLIC BLOOD PRESSURE: 102 MMHG

## 2021-09-30 DIAGNOSIS — Z12.31 BREAST CANCER SCREENING BY MAMMOGRAM: ICD-10-CM

## 2021-09-30 DIAGNOSIS — Z01.419 ENCNTR FOR GYN EXAM (GENERAL) (ROUTINE) W/O ABN FINDINGS: Primary | ICD-10-CM

## 2021-09-30 PROCEDURE — 99396 PREV VISIT EST AGE 40-64: CPT | Performed by: NURSE PRACTITIONER

## 2021-09-30 NOTE — PROGRESS NOTES
Assessment/Plan   Diagnoses and all orders for this visit:    Encntr for gyn exam (general) (routine) w/o abn findings  -     IGP, Aptima HPV    Breast cancer screening by mammogram  -     Mammo screening left w 3d & cad; Future      Discussion    Reviewed normal exam today  Pap with HPV done today per patient request    Normal breast exam today  Monthly SBEs advised  Mammograms yearly  Rx for left breast mammogram given  Encourage at least 1200 mg calcium citrate + 2000 IUs vitamin D3 divided through diet and supplement throughout the day  Encourage 30-40 min weight bearing exercise most days of week  Colon cancer screening with a colonoscopy is up to date  Dexa scan followed by Dr Virgen Nagel  All questions have been answered to her satisfaction  RTO for annual or sooner if needed    Subjective     Alec Arambula is a 62 y o  female who presents for annual well woman exam    Last exam 9/28/2020 Pap Normal HPV negative  Pap guidelines reviewed with patient  Pt would like Pap today  Pt denies any abnormal vaginal discharge, itching, or odor  Pt no longer sexually active and denies the need for STD testing today  Menstrual Cycle:  LMP: Postmenopausal since age 53 pt believes due to breast cancer at age 44  Denies any bleeding since menopause  Pt continues with night sweats every night, manageable  OB History     G 4 P 2 0 0 2   Practices monthly SBEs, no breast complaints today  History of breast cancer with recurrence lumpectomy in 2003 followed by mastectomy in 2007  Follows with Dr Virgen Nagel every 6 months  Last Mammogram 11/13/2020 Left BiRad I   Dxa scan 2019 Dr Virgen Nagel follows with her Dxa scans  Colonoscopy 5 years ago and Caity Jenningsus done yearly 1/1/2021 Negative   Denies any bowel or bladder issues  Pt follows with PCP for regular check-ups and blood work  '        Review of Systems   All other systems reviewed and are negative        The following portions of the patient's history were reviewed and updated as appropriate: allergies, current medications, past family history, past medical history, past social history, past surgical history and problem list     Past Medical History:   Diagnosis Date    Abnormal liver function test     Acute upper respiratory infection 2008    [place] [address] ) [place]    Cancer [place] [address] )     Cough 2008    Diabetes mellitus ([place] [address] )     History of acute sinusitis 2010    History of diarrhea     History of hypertension     History of motion sickness 2008    History of orthopnea 01/10/2008       Past Surgical History:   Procedure Laterality Date    [place]   Deadra Wolfgang MASTECTOMY Right 2003    TOOTH EXTRACTION         Family History   Problem Relation Age of Onset    Hypertension Father             Diabetes Family     Heart disease Family     Parkinsonism Mother     Dementia Mother     No Known Problems Sister     No Known Problems Daughter     No Known Problems Daughter     Mental illness Neg Hx        Social History     Socioeconomic History    Marital status: /Civil Union     Spouse name: Not on file    Number of children: Not on file    Years of education: Not on file    Highest education level: Not on file   Occupational History    Not on file   Tobacco Use    Smoking status: Never Smoker    Smokeless tobacco: Never Used   Vaping Use    Vaping Use: Never used   Substance and Sexual Activity    Alcohol use: Not Currently    Drug use: No    Sexual activity: Not Currently     Partners: Male   Other Topics Concern    Not on file   Social History Narrative    Not on file     Social Determinants of Health     Financial Resource Strain:     Difficulty of Paying Living Expenses:    Food Insecurity:     Worried About Running Out of Food in the Last Year:     Ran Out of Food in the Last Year:    Transportation Needs:     Lack of Transportation (Medical):      Lack of Transportation (Non-Medical):    Physical Activity:     Days of Exercise per Week:     Minutes of Exercise per Session:    Stress:     Feeling of Stress :    Social Connections:     Frequency of Communication with Friends and Family:     Frequency of Social Gatherings with Friends and Family:     Attends Congregational Services:     Active Member of Clubs or Organizations:     Attends Club or Organization Meetings:     Marital Status:    Intimate Partner Violence:     Fear of Current or Ex-Partner:     Emotionally Abused:     Physically Abused:     Sexually Abused:          Current Outpatient Medications:     Accu-Chek FastClix Lancets MISC, USE TO TEST DAILY, Disp: 102 each, Rfl: 3    Accu-Chek Guide test strip, USE TO TEST DAILY, Disp: 100 strip, Rfl: 3    aspirin 81 MG tablet, Take by mouth daily, Disp: , Rfl:     fexofenadine (ALLEGRA) 180 MG tablet, Take 360 mg by mouth daily, Disp: , Rfl:     Glucosamine-Chondroitin 250-200 MG TABS, Take by mouth daily , Disp: , Rfl:     letrozole (FEMARA) 2 5 mg tablet, Take 1 tablet (2 5 mg total) by mouth daily, Disp: 90 tablet, Rfl: 3    Multiple Vitamins-Iron (QC DAILY MULTIVITAMINS/IRON) TABS, Take by mouth daily, Disp: , Rfl:     nebivolol (Bystolic) 5 mg tablet, Take 1 tablet (5 mg total) by mouth daily, Disp: 90 tablet, Rfl: 3    pravastatin (PRAVACHOL) 10 mg tablet, Take 1 tablet (10 mg total) by mouth daily, Disp: 90 tablet, Rfl: 3    Probiotic Product (PROBIOTIC DAILY) CAPS, Take by mouth daily , Disp: , Rfl:     sAXagliptin-metFORMIN ER (Kombiglyze XR) 5-1000 MG TB24, Take 1 tablet by mouth daily, Disp: 90 tablet, Rfl: 3    telmisartan (MICARDIS) 40 mg tablet, Take 1 tablet (40 mg total) by mouth daily, Disp: 90 tablet, Rfl: 3    zinc gluconate 50 mg tablet, Take 50 mg by mouth daily, Disp: , Rfl:     Cholecalciferol (VITAMIN D3) LIQD, , Disp: , Rfl:     Ferrous Sulfate (IRON) 325 (65 Fe) MG TABS, Take by mouth daily, Disp: , Rfl:     omega-3-acid ethyl esters (LOVAZA) 1 g capsule, Take 1 capsule (1 g total) by mouth 2 (two) times a day, Disp: 60 capsule, Rfl: 1    Allergies   Allergen Reactions    Erythromycin Swelling     Reaction Date: 23Aug2007;     Penicillins Hives     Reaction Date: 23Aug2007;     Codeine Nausea Only     Reaction Date: 23Aug2007;     Sulfamethoxazole-Trimethoprim Hives    Sulfate Rash and Fever     Reaction Date: 23Aug2007;        Objective   Vitals:    09/30/21 1511   BP: 102/60   BP Location: Left arm   Patient Position: Sitting   Cuff Size: Standard   Weight: 45 4 kg (100 lb)     Physical Exam  Vitals and nursing note reviewed  Constitutional:       Appearance: She is well-developed  HENT:      Head: Normocephalic  Neck:      Thyroid: No thyromegaly  Trachea: No tracheal deviation  Cardiovascular:      Rate and Rhythm: Normal rate and regular rhythm  Heart sounds: Normal heart sounds  Pulmonary:      Effort: Pulmonary effort is normal       Breath sounds: Normal breath sounds  Chest:      Breasts: Breasts are symmetrical          Left: Normal  No inverted nipple, mass, nipple discharge, skin change or tenderness  Comments: Right breast mastectomy 2007  Abdominal:      General: Bowel sounds are normal  There is no distension  Palpations: Abdomen is soft  There is no mass  Tenderness: There is no abdominal tenderness  There is no guarding or rebound  Genitourinary:     Labia:         Right: No rash, tenderness, lesion or injury  Left: No rash, tenderness, lesion or injury  Vagina: Normal       Cervix: Normal       Uterus: Normal        Adnexa: Right adnexa normal and left adnexa normal         Right: No mass, tenderness or fullness  Left: No mass, tenderness or fullness  Musculoskeletal:         General: Normal range of motion  Cervical back: Normal range of motion and neck supple  Skin:     General: Skin is warm and dry     Neurological:      Mental Status: She is alert and oriented to person, place, and time  Psychiatric:         Behavior: Behavior normal          Thought Content:  Thought content normal          Judgment: Judgment normal

## 2021-10-03 LAB
CYTOLOGIST CVX/VAG CYTO: NORMAL
DX ICD CODE: NORMAL
HPV I/H RISK 4 DNA CVX QL PROBE+SIG AMP: NEGATIVE
OTHER STN SPEC: NORMAL
PATH REPORT.FINAL DX SPEC: NORMAL
SL AMB NOTE:: NORMAL
SL AMB SPECIMEN ADEQUACY: NORMAL
SL AMB TEST METHODOLOGY: NORMAL

## 2021-10-04 LAB
LEFT EYE DIABETIC RETINOPATHY: NORMAL
RIGHT EYE DIABETIC RETINOPATHY: NORMAL

## 2021-10-04 PROCEDURE — 2022F DILAT RTA XM EVC RTNOPTHY: CPT | Performed by: NURSE PRACTITIONER

## 2021-10-05 ENCOUNTER — VBI (OUTPATIENT)
Dept: ADMINISTRATIVE | Facility: OTHER | Age: 61
End: 2021-10-05

## 2021-11-02 ENCOUNTER — TELEPHONE (OUTPATIENT)
Dept: FAMILY MEDICINE CLINIC | Facility: CLINIC | Age: 61
End: 2021-11-02

## 2021-11-19 ENCOUNTER — HOSPITAL ENCOUNTER (OUTPATIENT)
Dept: RADIOLOGY | Facility: HOSPITAL | Age: 61
Discharge: HOME | End: 2021-11-19
Payer: COMMERCIAL

## 2021-11-19 VITALS — HEIGHT: 63 IN | BODY MASS INDEX: 17.72 KG/M2 | WEIGHT: 100 LBS

## 2021-11-19 DIAGNOSIS — Z12.31 BREAST CANCER SCREENING BY MAMMOGRAM: ICD-10-CM

## 2021-11-19 PROCEDURE — 77067 SCR MAMMO BI INCL CAD: CPT

## 2021-11-19 PROCEDURE — 77063 BREAST TOMOSYNTHESIS BI: CPT

## 2021-12-12 LAB
ALBUMIN/CREAT UR: 8 MCG/MG CREAT
CHOLEST SERPL-MCNC: 176 MG/DL
CHOLEST/HDLC SERPL: 2.8 (CALC)
CREAT UR-MCNC: 40 MG/DL (ref 20–275)
HBA1C MFR BLD: 5.4 % OF TOTAL HGB
HDLC SERPL-MCNC: 63 MG/DL
LDLC SERPL CALC-MCNC: 96 MG/DL (CALC)
MICROALBUMIN UR-MCNC: 0.3 MG/DL
NONHDLC SERPL-MCNC: 113 MG/DL (CALC)
TRIGL SERPL-MCNC: 76 MG/DL

## 2021-12-12 PROCEDURE — 3061F NEG MICROALBUMINURIA REV: CPT | Performed by: INTERNAL MEDICINE

## 2021-12-12 PROCEDURE — 3044F HG A1C LEVEL LT 7.0%: CPT | Performed by: INTERNAL MEDICINE

## 2021-12-14 LAB
ALBUMIN SERPL-MCNC: 4.1 G/DL (ref 3.6–5.1)
ALBUMIN/GLOB SERPL: 1.5 (CALC) (ref 1–2.5)
ALP SERPL-CCNC: 74 U/L (ref 37–153)
ALT SERPL-CCNC: 12 U/L (ref 6–29)
AST SERPL-CCNC: 18 U/L (ref 10–35)
BASOPHILS # BLD AUTO: 50 CELLS/UL (ref 0–200)
BASOPHILS NFR BLD AUTO: 0.8 %
BILIRUB SERPL-MCNC: 0.5 MG/DL (ref 0.2–1.2)
BUN SERPL-MCNC: 14 MG/DL (ref 7–25)
BUN/CREAT SERPL: ABNORMAL (CALC) (ref 6–22)
CALCIUM SERPL-MCNC: 9.5 MG/DL (ref 8.6–10.4)
CANCER AG125 SERPL-ACNC: 5 U/ML
CANCER AG15-3 SERPL-ACNC: 16 U/ML
CANCER AG27-29 SERPL-ACNC: 28 U/ML
CHLORIDE SERPL-SCNC: 101 MMOL/L (ref 98–110)
CO2 SERPL-SCNC: 30 MMOL/L (ref 20–32)
CREAT SERPL-MCNC: 0.92 MG/DL (ref 0.5–0.99)
EOSINOPHIL # BLD AUTO: 149 CELLS/UL (ref 15–500)
EOSINOPHIL NFR BLD AUTO: 2.4 %
ERYTHROCYTE [DISTWIDTH] IN BLOOD BY AUTOMATED COUNT: 13.2 % (ref 11–15)
GLOBULIN SER CALC-MCNC: 2.8 G/DL (CALC) (ref 1.9–3.7)
GLUCOSE SERPL-MCNC: 111 MG/DL (ref 65–99)
HCT VFR BLD AUTO: 39.1 % (ref 35–45)
HGB BLD-MCNC: 12.6 G/DL (ref 11.7–15.5)
LYMPHOCYTES # BLD AUTO: 1562 CELLS/UL (ref 850–3900)
LYMPHOCYTES NFR BLD AUTO: 25.2 %
MCH RBC QN AUTO: 26.8 PG (ref 27–33)
MCHC RBC AUTO-ENTMCNC: 32.2 G/DL (ref 32–36)
MCV RBC AUTO: 83.2 FL (ref 80–100)
MONOCYTES # BLD AUTO: 397 CELLS/UL (ref 200–950)
MONOCYTES NFR BLD AUTO: 6.4 %
NEUTROPHILS # BLD AUTO: 4042 CELLS/UL (ref 1500–7800)
NEUTROPHILS NFR BLD AUTO: 65.2 %
PLATELET # BLD AUTO: 240 THOUSAND/UL (ref 140–400)
PMV BLD REES-ECKER: 9.2 FL (ref 7.5–12.5)
POTASSIUM SERPL-SCNC: 4.4 MMOL/L (ref 3.5–5.3)
PROT SERPL-MCNC: 6.9 G/DL (ref 6.1–8.1)
RBC # BLD AUTO: 4.7 MILLION/UL (ref 3.8–5.1)
SL AMB EGFR AFRICAN AMERICAN: 78 ML/MIN/1.73M2
SL AMB EGFR NON AFRICAN AMERICAN: 68 ML/MIN/1.73M2
SODIUM SERPL-SCNC: 138 MMOL/L (ref 135–146)
WBC # BLD AUTO: 6.2 THOUSAND/UL (ref 3.8–10.8)

## 2021-12-27 ENCOUNTER — OFFICE VISIT (OUTPATIENT)
Dept: HEMATOLOGY ONCOLOGY | Facility: MEDICAL CENTER | Age: 61
End: 2021-12-27
Payer: COMMERCIAL

## 2021-12-27 ENCOUNTER — TELEPHONE (OUTPATIENT)
Dept: HEMATOLOGY ONCOLOGY | Facility: CLINIC | Age: 61
End: 2021-12-27

## 2021-12-27 ENCOUNTER — OFFICE VISIT (OUTPATIENT)
Dept: FAMILY MEDICINE CLINIC | Facility: CLINIC | Age: 61
End: 2021-12-27
Payer: COMMERCIAL

## 2021-12-27 VITALS
HEIGHT: 63 IN | OXYGEN SATURATION: 100 % | BODY MASS INDEX: 17.89 KG/M2 | RESPIRATION RATE: 16 BRPM | SYSTOLIC BLOOD PRESSURE: 122 MMHG | DIASTOLIC BLOOD PRESSURE: 78 MMHG | HEART RATE: 69 BPM | TEMPERATURE: 97.6 F | WEIGHT: 101 LBS

## 2021-12-27 VITALS
RESPIRATION RATE: 16 BRPM | WEIGHT: 101 LBS | HEIGHT: 63 IN | BODY MASS INDEX: 17.89 KG/M2 | SYSTOLIC BLOOD PRESSURE: 130 MMHG | DIASTOLIC BLOOD PRESSURE: 60 MMHG | HEART RATE: 72 BPM | TEMPERATURE: 97.7 F

## 2021-12-27 DIAGNOSIS — C50.911 ADENOCARCINOMA OF RIGHT BREAST (HCC): Primary | ICD-10-CM

## 2021-12-27 DIAGNOSIS — C50.911 ADENOCARCINOMA OF RIGHT BREAST (HCC): ICD-10-CM

## 2021-12-27 DIAGNOSIS — E11.3293 TYPE 2 DIABETES MELLITUS WITH BOTH EYES AFFECTED BY MILD NONPROLIFERATIVE RETINOPATHY WITHOUT MACULAR EDEMA, WITHOUT LONG-TERM CURRENT USE OF INSULIN (HCC): Primary | ICD-10-CM

## 2021-12-27 DIAGNOSIS — I10 BENIGN ESSENTIAL HYPERTENSION: ICD-10-CM

## 2021-12-27 DIAGNOSIS — C50.919 MALIGNANT NEOPLASM OF FEMALE BREAST, UNSPECIFIED ESTROGEN RECEPTOR STATUS, UNSPECIFIED LATERALITY, UNSPECIFIED SITE OF BREAST (HCC): ICD-10-CM

## 2021-12-27 DIAGNOSIS — E78.2 MIXED HYPERLIPIDEMIA: ICD-10-CM

## 2021-12-27 PROCEDURE — 1036F TOBACCO NON-USER: CPT | Performed by: FAMILY MEDICINE

## 2021-12-27 PROCEDURE — 3078F DIAST BP <80 MM HG: CPT | Performed by: INTERNAL MEDICINE

## 2021-12-27 PROCEDURE — 3074F SYST BP LT 130 MM HG: CPT | Performed by: INTERNAL MEDICINE

## 2021-12-27 PROCEDURE — 3008F BODY MASS INDEX DOCD: CPT | Performed by: INTERNAL MEDICINE

## 2021-12-27 PROCEDURE — 99214 OFFICE O/P EST MOD 30 MIN: CPT | Performed by: FAMILY MEDICINE

## 2021-12-27 PROCEDURE — 99214 OFFICE O/P EST MOD 30 MIN: CPT | Performed by: INTERNAL MEDICINE

## 2021-12-29 ENCOUNTER — HOSPITAL ENCOUNTER (OUTPATIENT)
Dept: RADIOLOGY | Facility: HOSPITAL | Age: 61
Discharge: HOME | End: 2021-12-29
Attending: INTERNAL MEDICINE
Payer: COMMERCIAL

## 2021-12-29 DIAGNOSIS — C50.911 ADENOCARCINOMA OF RIGHT BREAST (HCC): ICD-10-CM

## 2021-12-29 PROCEDURE — 77080 DXA BONE DENSITY AXIAL: CPT

## 2022-03-02 DIAGNOSIS — I10 BENIGN ESSENTIAL HYPERTENSION: ICD-10-CM

## 2022-03-02 RX ORDER — NEBIVOLOL 5 MG/1
TABLET ORAL
Qty: 90 TABLET | Refills: 3 | Status: SHIPPED | OUTPATIENT
Start: 2022-03-02

## 2022-05-19 ENCOUNTER — OFFICE VISIT (OUTPATIENT)
Dept: CARDIOLOGY CLINIC | Facility: CLINIC | Age: 62
End: 2022-05-19
Payer: COMMERCIAL

## 2022-05-19 VITALS
SYSTOLIC BLOOD PRESSURE: 140 MMHG | TEMPERATURE: 97 F | BODY MASS INDEX: 17.56 KG/M2 | OXYGEN SATURATION: 99 % | WEIGHT: 99.1 LBS | HEIGHT: 63 IN | DIASTOLIC BLOOD PRESSURE: 90 MMHG | HEART RATE: 80 BPM

## 2022-05-19 DIAGNOSIS — R00.2 PALPITATIONS: Primary | ICD-10-CM

## 2022-05-19 DIAGNOSIS — E78.2 MIXED HYPERLIPIDEMIA: ICD-10-CM

## 2022-05-19 DIAGNOSIS — I10 BENIGN ESSENTIAL HYPERTENSION: ICD-10-CM

## 2022-05-19 PROCEDURE — 3080F DIAST BP >= 90 MM HG: CPT | Performed by: INTERNAL MEDICINE

## 2022-05-19 PROCEDURE — 3077F SYST BP >= 140 MM HG: CPT | Performed by: INTERNAL MEDICINE

## 2022-05-19 PROCEDURE — 3008F BODY MASS INDEX DOCD: CPT | Performed by: INTERNAL MEDICINE

## 2022-05-19 PROCEDURE — 93000 ELECTROCARDIOGRAM COMPLETE: CPT | Performed by: INTERNAL MEDICINE

## 2022-05-19 PROCEDURE — 99214 OFFICE O/P EST MOD 30 MIN: CPT | Performed by: INTERNAL MEDICINE

## 2022-05-19 PROCEDURE — 1036F TOBACCO NON-USER: CPT | Performed by: INTERNAL MEDICINE

## 2022-05-19 NOTE — PROGRESS NOTES
[facility]  [address] [facility] [address], [address]  Cardiology Follow-up  Ny Tsai  [id number]  1960      Consult for:Palpitations  Appreciate consult by: Hanna Cox DO    1  Palpitations     2  Benign essential hypertension  POCT ECG   3  Mixed hyperlipidemia  POCT ECG      Discussion/Summary:   Palpitations-no major symptoms  No afib/flutter  Normal qtc  Normal resting ekg Continue monitor  Htn- controlled  bystolic 5mg daily + micardis 40mg daily  Hld- pravastatin 10mg  LDL of 96 HDL 51  Target less than 70  Omega 3 2000mg to target improved HDL above 55  Prior hx of mediastinal radiation  Dm2- A1c-5  6  Very well controlled    Rtc- 1 year    HPI:   62 yo with hx hld, prior mediastinal radiation presents for initial visit  Her blood pressures have been well controlled  She has been compliant with a low-dose statin medication  She has no prior history of atrial fibrillation  She reports having very sporadic and seldom palpitations  She denies feeling dizziness or lightheadedness  She had a stress test in 2019 where she was able to reach target and goal   She denies having any recent fevers or chills  Her prior records were reviewed  04/22/2021:  She continues to be very active  She states her blood pressures have been well controlled at home  She periodically has few palpitations  She is currently in normal sinus rhythm  She denies having PND orthopnea  She denies having chest heaviness  Reviewed her last lab work  Her LDL is trending lower  05/19/2022:  She denies having major palpitations  Her blood pressure is usually very well controlled  No major EKG changes  We discussed about her recent lipid panel  She has repeat blood work pending  She is currently walking and doing activities without major palpitations or shortness of breath      PMH  Breast cancer- R mastectomy 2003-                           2007 lumpectomy- R sided  Last stress test 2019- negative    Social Hx  No smoking hx  Work for [employer]  No snoring    Family Hx  Father- heart disease in 76s    Past Medical History:   Diagnosis Date    Abnormal liver function test     Acute upper respiratory infection 2008    [facility] ([facility] ) 2003    Cancer ([facility] )     Cough 2008    Diabetes mellitus ([place] [address] )     History of acute sinusitis 2010    History of diarrhea     History of hypertension     History of motion sickness 2008    History of orthopnea 01/10/2008     Social History     Socioeconomic History    Marital status: /Civil Union     Spouse name: Not on file    Number of children: Not on file    Years of education: Not on file    Highest education level: Not on file   Occupational History    Not on file   Tobacco Use    Smoking status: Never Smoker    Smokeless tobacco: Never Used   Vaping Use    Vaping Use: Never used   Substance and Sexual Activity    Alcohol use: Not Currently    Drug use: No    Sexual activity: Not Currently     Partners: Male   Other Topics Concern    Not on file   Social History Narrative    Not on file     Social Determinants of Health     Financial Resource Strain: Not on file   Food Insecurity: Not on file   Transportation Needs: Not on file   Physical Activity: Not on file   Stress: Not on file   Social Connections: Not on file   Intimate Partner Violence: Not on file   Housing Stability: Not on file      Family History   Problem Relation Age of Onset    Hypertension Father             Diabetes Family     Heart disease Family     Parkinsonism Mother     Dementia Mother     No Known Problems Sister     No Known Problems Daughter     No Known Problems Daughter     Mental illness Neg Hx      Past Surgical History:   Procedure Laterality Date    [place]Y Right [place]   Suha Sang MASTECTOMY Right 2003    TOOTH EXTRACTION         Current Outpatient Medications:     Accu-Chek FastClix Lancets MISC, USE TO TEST DAILY, Disp: 102 each, Rfl: 3    Accu-Chek Guide test strip, USE TO TEST DAILY, Disp: 100 strip, Rfl: 3    aspirin 81 MG tablet, Take by mouth daily, Disp: , Rfl:     fexofenadine (ALLEGRA) 180 MG tablet, Take 360 mg by mouth daily, Disp: , Rfl:     Glucosamine-Chondroitin 250-200 MG TABS, Take by mouth daily , Disp: , Rfl:     letrozole (FEMARA) 2 5 mg tablet, Take 1 tablet (2 5 mg total) by mouth daily, Disp: 90 tablet, Rfl: 3    Multiple Vitamins-Iron (QC DAILY MULTIVITAMINS/IRON) TABS, Take by mouth daily, Disp: , Rfl:     nebivolol (BYSTOLIC) 5 mg tablet, TAKE 1 TABLET DAILY, Disp: 90 tablet, Rfl: 3    pravastatin (PRAVACHOL) 10 mg tablet, Take 1 tablet (10 mg total) by mouth daily, Disp: 90 tablet, Rfl: 3    Probiotic Product (PROBIOTIC DAILY) CAPS, Take by mouth daily , Disp: , Rfl:     sAXagliptin-metFORMIN ER (Kombiglyze XR) 5-1000 MG TB24, Take 1 tablet by mouth daily, Disp: 90 tablet, Rfl: 3    telmisartan (MICARDIS) 40 mg tablet, Take 1 tablet (40 mg total) by mouth daily, Disp: 90 tablet, Rfl: 3    zinc gluconate 50 mg tablet, Take 50 mg by mouth daily, Disp: , Rfl:     Cholecalciferol (VITAMIN D3) LIQD, , Disp: , Rfl:     Ferrous Sulfate (IRON) 325 (65 Fe) MG TABS, Take by mouth daily, Disp: , Rfl:     omega-3-acid ethyl esters (LOVAZA) 1 g capsule, Take 1 capsule (1 g total) by mouth 2 (two) times a day, Disp: 60 capsule, Rfl: 1  Allergies   Allergen Reactions    Erythromycin Swelling     Reaction Date: 23Aug2007;     Penicillins Hives     Reaction Date: 23Aug2007;     Codeine Nausea Only     Reaction Date: 23Aug2007;     Sulfamethoxazole-Trimethoprim Hives    Sulfate Rash and Fever     Reaction Date: 23Aug2007;      Vitals:    05/19/22 1628   BP: 140/90   BP Location: Left arm   Patient Position: Sitting   Cuff Size: Child   Pulse: 80   Temp: (!) 97 °F (36 1 °C)   SpO2: 99%   Weight: 45 kg (99 lb 1 6 oz) Height: 5' 3" (1 6 m)       Review of Systems:   Review of Systems   Constitutional: Negative  Negative for activity change, appetite change, chills, diaphoresis, fatigue, fever and unexpected weight change  HENT: Negative  Negative for congestion, dental problem, drooling, ear discharge, ear pain, facial swelling, hearing loss, mouth sores, nosebleeds, postnasal drip, rhinorrhea, sinus pressure, sinus pain, sneezing, sore throat, tinnitus, trouble swallowing and voice change  Eyes: Negative  Negative for photophobia, pain, redness, itching and visual disturbance  Respiratory: Negative  Negative for apnea, cough, choking, chest tightness, shortness of breath, wheezing and stridor  Cardiovascular: Positive for palpitations  Negative for chest pain and leg swelling  Gastrointestinal: Negative  Negative for abdominal distention, abdominal pain, anal bleeding, blood in stool, constipation, diarrhea, nausea, rectal pain and vomiting  Endocrine: Negative  Negative for cold intolerance, heat intolerance, polydipsia, polyphagia and polyuria  Genitourinary: Negative  Negative for decreased urine volume, difficulty urinating, dyspareunia, dysuria, enuresis, flank pain, frequency, genital sores, hematuria, menstrual problem, pelvic pain, urgency, vaginal bleeding, vaginal discharge and vaginal pain  Musculoskeletal: Negative  Negative for arthralgias, back pain, gait problem, joint swelling, myalgias, neck pain and neck stiffness  Skin: Negative  Negative for color change, pallor, rash and wound  Allergic/Immunologic: Negative  Negative for environmental allergies, food allergies and immunocompromised state  Neurological: Negative  Negative for dizziness, tremors, seizures, syncope, facial asymmetry, speech difficulty, weakness, light-headedness, numbness and headaches  Hematological: Negative  Negative for adenopathy  Does not bruise/bleed easily  Psychiatric/Behavioral: Negative  Negative for agitation, behavioral problems, confusion, decreased concentration, dysphoric mood, hallucinations, self-injury, sleep disturbance and suicidal ideas  The patient is not nervous/anxious and is not hyperactive  All other systems reviewed and are negative  Vitals:    05/19/22 1628   BP: 140/90   BP Location: Left arm   Patient Position: Sitting   Cuff Size: Child   Pulse: 80   Temp: (!) 97 °F (36 1 °C)   SpO2: 99%   Weight: 45 kg (99 lb 1 6 oz)   Height: 5' 3" (1 6 m)     Physical Examination:   Physical Exam  Constitutional:       General: She is not in acute distress  Appearance: She is well-developed  She is not diaphoretic  HENT:      Head: Normocephalic and atraumatic  Right Ear: External ear normal       Left Ear: External ear normal    Eyes:      General: No scleral icterus  Right eye: No discharge  Left eye: No discharge  Conjunctiva/sclera: Conjunctivae normal       Pupils: Pupils are equal, round, and reactive to light  Neck:      Thyroid: No thyromegaly  Vascular: No JVD  Trachea: No tracheal deviation  Cardiovascular:      Rate and Rhythm: Normal rate and regular rhythm  Heart sounds: No murmur heard  No friction rub  No gallop  Pulmonary:      Effort: Pulmonary effort is normal  No respiratory distress  Breath sounds: Normal breath sounds  No stridor  No wheezing or rales  Chest:      Chest wall: No tenderness  Abdominal:      General: Bowel sounds are normal  There is no distension  Palpations: Abdomen is soft  There is no mass  Tenderness: There is no abdominal tenderness  There is no guarding or rebound  Musculoskeletal:         General: No tenderness or deformity  Normal range of motion  Cervical back: Normal range of motion and neck supple  Skin:     General: Skin is warm and dry  Coloration: Skin is not pale  Findings: No erythema or rash     Neurological:      Mental Status: She is alert and oriented to person, place, and time  Cranial Nerves: No cranial nerve deficit  Motor: No abnormal muscle tone  Coordination: Coordination normal       Deep Tendon Reflexes: Reflexes are normal and symmetric  Reflexes normal    Psychiatric:         Behavior: Behavior normal          Thought Content: Thought content normal          Judgment: Judgment normal          Labs:     Lab Results   Component Value Date    WBC 6 2 2021    HGB 12 6 2021    HCT 39 1 2021    MCV 83 2 2021    RDW 13 2 2021     2021     BMP:  Lab Results   Component Value Date    SODIUM 138 2021    K 4 4 2021     2021    CO2 30 2021    BUN 14 2021    CREATININE 0 92 2021    GLUC 111 (H) 2021    GLUF 129 2016    CALCIUM 9 5 2021     LFT:  Lab Results   Component Value Date    AST 18 2021    ALT 12 2021    ALKPHOS 74 2021    TP 6 9 2021    ALB 4 1 2021      Lab Results   Component Value Date    XGY1RIZVBPHD 1 15 2016     Lab Results   Component Value Date    HGBA1C 5 4 2021     Lipid Profile:   Lab Results   Component Value Date    CHOLESTEROL 176 2021    HDL 63 2021    LDLCALC 96 2021    TRIG 76 2021     Lab Results   Component Value Date    CHOLESTEROL 176 2021    CHOLESTEROL 145 2021       Imaging & Testing   I have personally reviewed pertinent reports        Cardiac Testing     Results for orders placed during the hospital encounter of 19   Echo complete with contrast if indicated    Narrative [address]  [address] [facility] [address]  [phone]    Transthoracic Echocardiogram  2D, M-mode, Doppler, and Color Doppler    Study date:  2019    Patient: FARIDA LANDRUM  MR number: [id number]  Account number: [de-identified]  : 1960  Age: 63 years  Gender: Female  Status: Outpatient  Location: Echo lab  Height: 62 in  Weight: 119 7 lb  BP: 166/ 76 mmHg    Indications: Palpitations    Diagnoses: R00 2 - Palpitations    Sonographer:  Shahab Marie RDCS  Primary Physician:  Geovanna Phipps Batch, DO  Referring Physician:  Kylah Velasco MD  [facility]  [facility]  Ordering Physician:  Kylah Velasco MD  Interpreting Physician:  Alyssa Stover MD    SUMMARY    LEFT VENTRICLE:  Systolic function was normal by EF (single plane method of disks)  Ejection fraction was estimated in the range of 60 % to 65 % to be 63 %  There were no regional wall motion abnormalities  LEFT ATRIUM:  Size was normal     RIGHT ATRIUM:  Size was normal     HISTORY: PRIOR HISTORY: Hypertension, Breast cancer, Diabetes Mellitus, Hyperlipidemia    PROCEDURE: The procedure was performed in the echo lab  This was a routine study  The transthoracic approach was used  The study included complete 2D imaging, M-mode, complete spectral Doppler, and color Doppler  The heart rate was 96 bpm,  at the start of the study  Image quality was adequate  LEFT VENTRICLE: Size was normal  Systolic function was normal by EF (single plane method of disks)  Ejection fraction was estimated in the range of 60 % to 65 % to be 63 %  There were no regional wall motion abnormalities  Wall thickness  was normal  No evidence of apical thrombus  DOPPLER: Left ventricular diastolic function parameters were normal for the patient's age  RIGHT VENTRICLE: The size was normal  Systolic function was normal  Wall thickness was normal     LEFT ATRIUM: Size was normal     RIGHT ATRIUM: Size was normal     MITRAL VALVE: Valve structure was normal  There was normal leaflet separation  DOPPLER: The transmitral velocity was within the normal range  There was no evidence for stenosis  There was no significant regurgitation  AORTIC VALVE: The valve was trileaflet  Leaflets exhibited mildly increased thickness and normal cuspal separation   DOPPLER: Transaortic velocity was within the normal range  There was no evidence for stenosis  There was no significant  regurgitation  TRICUSPID VALVE: The valve structure was normal  There was normal leaflet separation  DOPPLER: The transtricuspid velocity was within the normal range  There was no evidence for stenosis  There was no significant regurgitation  PULMONIC VALVE: Leaflets exhibited normal thickness, no calcification, and normal cuspal separation  DOPPLER: The transpulmonic velocity was within the normal range  There was trace regurgitation  PERICARDIUM: There was no pericardial effusion  The pericardium was normal in appearance  AORTA: The root exhibited normal size  SYSTEMIC VEINS: IVC: The inferior vena cava was not well visualized  SYSTEM MEASUREMENT TABLES    2D mode  AoR Diam 2D: 2 8 cm  LA Diam (2D): 3 cm  LA/Ao (2D): 1 07  FS (2D Teich): 35 6 %  IVSd (2D): 0 74 cm  LVDEV: 69 6 cm³  LVESV: 23 9 cm³  LVIDd(2D): 3 99 cm  LVISd (2D): 2 57 cm  LVOT Area 2D: 2 54 cm squared  LVPWd (2D): 0 76 cm  SV (Teich): 45 7 cm³    Apical four chamber  LVEF A4C: 63 %    Unspecified Scan Mode  NESS Cont Eq (Peak Miguel): 2 42 cm squared  LVOT Diam : 1 8 cm  LVOT Vmax: 1050 mm/s  LVOT Vmax; Mean: 1050 mm/s  Peak Grad ; Mean: 4 mm[Hg]  MV Peak A Miguel: 982 mm/s  MV Peak E Miguel  Mean: 899 mm/s  MVA (PHT): 4 cm squared  PHT: 55 ms  Max P mm[Hg]  V Max: 2060 mm/s  Vmax: 2060 mm/s  RA Area: 12 cm squared  RA Volume: 27 1 cm³  TAPSE: 2 4 cm    [employer] Accredited Echocardiography Laboratory    Prepared and electronically signed by    Sunita Hancock MD  Signed 2019 10:42:27     Last stress test 2 years ago doing 10-12 minutes without having any significant symptoms  EKG: Personally reviewed      Normal sinus rhythm 74bpm no acute st/t wave changes      Sunita Hancock MD [facility]  [phone]  Please call with any questions or suggestions    Counseling :  A description of the counseling: Goals and Barriers:  Patient's ability to self care:  Medication side effect reviewed with patient in detail and all their questions answered  "Portions of the record may have been created with voice recognition software  Occasional wrong word or "sound a like" substitutions may have occurred due to the inherent limitations of voice recognition software  Read the chart carefully and recognize, using context, where substitutions have occurred   Please call if you have any questions  "

## 2022-05-31 DIAGNOSIS — E78.2 MIXED HYPERLIPIDEMIA: ICD-10-CM

## 2022-05-31 DIAGNOSIS — E11.3293 TYPE 2 DIABETES MELLITUS WITH BOTH EYES AFFECTED BY MILD NONPROLIFERATIVE RETINOPATHY WITHOUT MACULAR EDEMA, WITHOUT LONG-TERM CURRENT USE OF INSULIN (HCC): ICD-10-CM

## 2022-05-31 DIAGNOSIS — I10 BENIGN ESSENTIAL HYPERTENSION: ICD-10-CM

## 2022-05-31 PROCEDURE — 4010F ACE/ARB THERAPY RXD/TAKEN: CPT | Performed by: INTERNAL MEDICINE

## 2022-05-31 RX ORDER — TELMISARTAN 40 MG/1
TABLET ORAL
Qty: 90 TABLET | Refills: 3 | Status: SHIPPED | OUTPATIENT
Start: 2022-05-31

## 2022-05-31 RX ORDER — SAXAGLIPTIN AND METFORMIN HYDROCHLORIDE 5; 1000 MG/1; MG/1
TABLET, FILM COATED, EXTENDED RELEASE ORAL
Qty: 90 TABLET | Refills: 3 | Status: SHIPPED | OUTPATIENT
Start: 2022-05-31

## 2022-05-31 RX ORDER — PRAVASTATIN SODIUM 10 MG
TABLET ORAL
Qty: 90 TABLET | Refills: 3 | Status: SHIPPED | OUTPATIENT
Start: 2022-05-31

## 2022-06-13 ENCOUNTER — TELEPHONE (OUTPATIENT)
Dept: HEMATOLOGY ONCOLOGY | Facility: CLINIC | Age: 62
End: 2022-06-13

## 2022-06-13 NOTE — TELEPHONE ENCOUNTER
CALL RETURN FORM   Reason for patient call? Patient is requesting lab order be faxed to her fax# [phone]   Patient's primary oncologist? Dr Kristen Leyva   Name of person the patient was calling for? Dr Kristen Leyva   Any additional information to add, if applicable? [phone]   Informed patient that the message will be forwarded to the team and someone will get back to them as soon as possible    Did you relay this information to the patient?  yes

## 2022-06-19 LAB
ALBUMIN/CREAT UR: NORMAL MCG/MG CREAT
CHOLEST SERPL-MCNC: 172 MG/DL
CHOLEST/HDLC SERPL: 3.2 (CALC)
CREAT UR-MCNC: 32 MG/DL (ref 20–275)
HBA1C MFR BLD: 5.6 % OF TOTAL HGB
HDLC SERPL-MCNC: 54 MG/DL
LDLC SERPL CALC-MCNC: 94 MG/DL (CALC)
MICROALBUMIN UR-MCNC: <0.2 MG/DL
NONHDLC SERPL-MCNC: 118 MG/DL (CALC)
TRIGL SERPL-MCNC: 138 MG/DL

## 2022-06-19 PROCEDURE — 3044F HG A1C LEVEL LT 7.0%: CPT | Performed by: PHYSICIAN ASSISTANT

## 2022-06-20 ENCOUNTER — RA CDI HCC (OUTPATIENT)
Dept: OTHER | Facility: HOSPITAL | Age: 62
End: 2022-06-20

## 2022-06-20 NOTE — PROGRESS NOTES
[facility]  coding opportunities       Chart reviewed, no opportunity found: CHART REVIEWED, NO OPPORTUNITY FOUND        Patients Insurance        Commercial Insurance: [employer]

## 2022-06-21 ENCOUNTER — TELEPHONE (OUTPATIENT)
Dept: HEMATOLOGY ONCOLOGY | Facility: CLINIC | Age: 62
End: 2022-06-21

## 2022-06-21 DIAGNOSIS — C50.919 MALIGNANT NEOPLASM OF BREAST IN FEMALE, ESTROGEN RECEPTOR POSITIVE, UNSPECIFIED LATERALITY, UNSPECIFIED SITE OF BREAST (HCC): ICD-10-CM

## 2022-06-21 DIAGNOSIS — Z17.0 MALIGNANT NEOPLASM OF BREAST IN FEMALE, ESTROGEN RECEPTOR POSITIVE, UNSPECIFIED LATERALITY, UNSPECIFIED SITE OF BREAST (HCC): ICD-10-CM

## 2022-06-21 LAB
ALBUMIN SERPL-MCNC: 4.2 G/DL (ref 3.6–5.1)
ALBUMIN/GLOB SERPL: 1.6 (CALC) (ref 1–2.5)
ALP SERPL-CCNC: 74 U/L (ref 37–153)
ALT SERPL-CCNC: 14 U/L (ref 6–29)
AST SERPL-CCNC: 19 U/L (ref 10–35)
BASOPHILS # BLD AUTO: 0 CELLS/UL (ref 0–200)
BASOPHILS NFR BLD AUTO: 0 %
BILIRUB SERPL-MCNC: 0.3 MG/DL (ref 0.2–1.2)
BUN SERPL-MCNC: 8 MG/DL (ref 7–25)
BUN/CREAT SERPL: ABNORMAL (CALC) (ref 6–22)
CALCIUM SERPL-MCNC: 9.6 MG/DL (ref 8.6–10.4)
CANCER AG125 SERPL-ACNC: 6 U/ML
CANCER AG15-3 SERPL-ACNC: 19 U/ML
CANCER AG27-29 SERPL-ACNC: 27 U/ML
CHLORIDE SERPL-SCNC: 98 MMOL/L (ref 98–110)
CO2 SERPL-SCNC: 28 MMOL/L (ref 20–32)
CREAT SERPL-MCNC: 0.85 MG/DL (ref 0.5–0.99)
EOSINOPHIL # BLD AUTO: 0 CELLS/UL (ref 15–500)
EOSINOPHIL NFR BLD AUTO: 0 %
ERYTHROCYTE [DISTWIDTH] IN BLOOD BY AUTOMATED COUNT: 13.3 % (ref 11–15)
GLOBULIN SER CALC-MCNC: 2.7 G/DL (CALC) (ref 1.9–3.7)
GLUCOSE SERPL-MCNC: 114 MG/DL (ref 65–99)
HCT VFR BLD AUTO: 36.8 % (ref 35–45)
HGB BLD-MCNC: 12.6 G/DL (ref 11.7–15.5)
LYMPHOCYTES # BLD AUTO: 2087 CELLS/UL (ref 850–3900)
LYMPHOCYTES NFR BLD AUTO: 27.1 %
MCH RBC QN AUTO: 27.3 PG (ref 27–33)
MCHC RBC AUTO-ENTMCNC: 34.2 G/DL (ref 32–36)
MCV RBC AUTO: 79.8 FL (ref 80–100)
MONOCYTES # BLD AUTO: 501 CELLS/UL (ref 200–950)
MONOCYTES NFR BLD AUTO: 6.5 %
NEUTROPHILS # BLD AUTO: 5113 CELLS/UL (ref 1500–7800)
NEUTROPHILS NFR BLD AUTO: 66.4 %
PLATELET # BLD AUTO: 211 THOUSAND/UL (ref 140–400)
PMV BLD REES-ECKER: 9.2 FL (ref 7.5–12.5)
POTASSIUM SERPL-SCNC: 4.2 MMOL/L (ref 3.5–5.3)
PROT SERPL-MCNC: 6.9 G/DL (ref 6.1–8.1)
RBC # BLD AUTO: 4.61 MILLION/UL (ref 3.8–5.1)
SL AMB EGFR AFRICAN AMERICAN: 86 ML/MIN/1.73M2
SL AMB EGFR NON AFRICAN AMERICAN: 74 ML/MIN/1.73M2
SODIUM SERPL-SCNC: 134 MMOL/L (ref 135–146)
WBC # BLD AUTO: 7.7 THOUSAND/UL (ref 3.8–10.8)

## 2022-06-21 NOTE — TELEPHONE ENCOUNTER
Medication Refill     Who is Calling  [facility] Arden Contreras   Medication letrozole [facility]) 2 5 mg tablet        How many pills left unknown   Preferred Pharmacy / Address [facility] [address] [place] [address]   Phone:  [phone]  Fax:  [phone]    Who is your Physician?  Dr Deatra Bacon   Call back number [phone] reference# [id number]   Relevant Information N/a

## 2022-06-22 RX ORDER — LETROZOLE 2.5 MG/1
2.5 TABLET, FILM COATED ORAL DAILY
Qty: 90 TABLET | Refills: 1 | Status: SHIPPED | OUTPATIENT
Start: 2022-06-22 | End: 2022-06-27 | Stop reason: SDUPTHER

## 2022-06-27 ENCOUNTER — OFFICE VISIT (OUTPATIENT)
Dept: FAMILY MEDICINE CLINIC | Facility: CLINIC | Age: 62
End: 2022-06-27
Payer: COMMERCIAL

## 2022-06-27 ENCOUNTER — OFFICE VISIT (OUTPATIENT)
Dept: HEMATOLOGY ONCOLOGY | Facility: MEDICAL CENTER | Age: 62
End: 2022-06-27
Payer: COMMERCIAL

## 2022-06-27 VITALS
RESPIRATION RATE: 16 BRPM | BODY MASS INDEX: 17.65 KG/M2 | OXYGEN SATURATION: 100 % | WEIGHT: 99.6 LBS | DIASTOLIC BLOOD PRESSURE: 66 MMHG | HEART RATE: 78 BPM | TEMPERATURE: 98.4 F | HEIGHT: 63 IN | SYSTOLIC BLOOD PRESSURE: 118 MMHG

## 2022-06-27 VITALS
DIASTOLIC BLOOD PRESSURE: 76 MMHG | BODY MASS INDEX: 17.54 KG/M2 | TEMPERATURE: 97.4 F | OXYGEN SATURATION: 99 % | WEIGHT: 99 LBS | HEIGHT: 63 IN | RESPIRATION RATE: 18 BRPM | SYSTOLIC BLOOD PRESSURE: 136 MMHG | HEART RATE: 73 BPM

## 2022-06-27 DIAGNOSIS — Z12.31 SCREENING MAMMOGRAM FOR BREAST CANCER: ICD-10-CM

## 2022-06-27 DIAGNOSIS — E11.3293 TYPE 2 DIABETES MELLITUS WITH BOTH EYES AFFECTED BY MILD NONPROLIFERATIVE RETINOPATHY WITHOUT MACULAR EDEMA, WITHOUT LONG-TERM CURRENT USE OF INSULIN (HCC): Primary | ICD-10-CM

## 2022-06-27 DIAGNOSIS — E78.2 MIXED HYPERLIPIDEMIA: ICD-10-CM

## 2022-06-27 DIAGNOSIS — C50.911 ADENOCARCINOMA OF RIGHT BREAST (HCC): ICD-10-CM

## 2022-06-27 DIAGNOSIS — C50.919 MALIGNANT NEOPLASM OF BREAST IN FEMALE, ESTROGEN RECEPTOR POSITIVE, UNSPECIFIED LATERALITY, UNSPECIFIED SITE OF BREAST (HCC): Primary | ICD-10-CM

## 2022-06-27 DIAGNOSIS — M85.80 OSTEOPENIA AFTER MENOPAUSE: ICD-10-CM

## 2022-06-27 DIAGNOSIS — C50.919 MALIGNANT NEOPLASM OF FEMALE BREAST, UNSPECIFIED ESTROGEN RECEPTOR STATUS, UNSPECIFIED LATERALITY, UNSPECIFIED SITE OF BREAST (HCC): ICD-10-CM

## 2022-06-27 DIAGNOSIS — Z78.0 OSTEOPENIA AFTER MENOPAUSE: ICD-10-CM

## 2022-06-27 DIAGNOSIS — I10 BENIGN ESSENTIAL HYPERTENSION: ICD-10-CM

## 2022-06-27 DIAGNOSIS — Z17.0 MALIGNANT NEOPLASM OF BREAST IN FEMALE, ESTROGEN RECEPTOR POSITIVE, UNSPECIFIED LATERALITY, UNSPECIFIED SITE OF BREAST (HCC): Primary | ICD-10-CM

## 2022-06-27 PROCEDURE — 3078F DIAST BP <80 MM HG: CPT | Performed by: PHYSICIAN ASSISTANT

## 2022-06-27 PROCEDURE — 99214 OFFICE O/P EST MOD 30 MIN: CPT | Performed by: FAMILY MEDICINE

## 2022-06-27 PROCEDURE — 3008F BODY MASS INDEX DOCD: CPT | Performed by: PHYSICIAN ASSISTANT

## 2022-06-27 PROCEDURE — 3725F SCREEN DEPRESSION PERFORMED: CPT | Performed by: FAMILY MEDICINE

## 2022-06-27 PROCEDURE — 3074F SYST BP LT 130 MM HG: CPT | Performed by: PHYSICIAN ASSISTANT

## 2022-06-27 PROCEDURE — 99215 OFFICE O/P EST HI 40 MIN: CPT | Performed by: PHYSICIAN ASSISTANT

## 2022-06-27 PROCEDURE — 1036F TOBACCO NON-USER: CPT | Performed by: PHYSICIAN ASSISTANT

## 2022-06-27 RX ORDER — LETROZOLE 2.5 MG/1
2.5 TABLET, FILM COATED ORAL DAILY
Qty: 90 TABLET | Refills: 3 | Status: SHIPPED | OUTPATIENT
Start: 2022-06-27

## 2022-06-27 NOTE — PROGRESS NOTES
Assessment/Plan:    1  Type 2 diabetes mellitus with both eyes affected by mild nonproliferative retinopathy without macular edema, without long-term current use of insulin (HCC)  -     Hemoglobin A1C; Future; Expected date: 12/24/2022    2  Benign essential hypertension  Assessment & Plan:  stable    Orders:  -     Comprehensive metabolic panel; Future; Expected date: 12/24/2022    3  Mixed hyperlipidemia  -     Lipid Panel with Direct LDL reflex; Future; Expected date: 12/24/2022    4  Malignant neoplasm of female breast, unspecified estrogen receptor status, unspecified laterality, unspecified site of breast ([facility] )    5  Adenocarcinoma of right breast ([facility] )      BMI Counseling: Body mass index is 17 54 kg/m²  The BMI is below normal  Patient advised to gain weight  Rationale for BMI follow-up plan is due to patient being underweight  Depression Screening and Follow-up Plan: Patient was screened for depression during today's encounter  They screened negative with a PHQ-2 score of 0  There are no Patient Instructions on file for this visit  Return in 6 months (on 12/27/2022) for Diabetes follow-up  Subjective:      Patient ID: Bryant Haskins is a 62 y o  female  Chief Complaint   Patient presents with    Follow-up     6 months  [id number]    shoes and socks off        Pt is here for a 6 month follow up  Pt had labs  Feels well      The following portions of the patient's history were reviewed and updated as appropriate: allergies, current medications, past family history, past medical history, past social history, past surgical history and problem list     Review of Systems   Constitutional: Negative  Negative for activity change, appetite change, chills, diaphoresis and fatigue  HENT: Negative  Negative for dental problem, ear pain, sinus pressure and sore throat  Eyes: Negative  Negative for photophobia, pain, discharge, redness, itching and visual disturbance     Respiratory: Negative for apnea and chest tightness  Cardiovascular: Negative  Negative for chest pain, palpitations and leg swelling  Gastrointestinal: Negative  Negative for abdominal distention, abdominal pain, constipation and diarrhea  Endocrine: Negative  Negative for cold intolerance and heat intolerance  Genitourinary: Negative  Negative for difficulty urinating and dyspareunia  Musculoskeletal: Negative  Negative for arthralgias and back pain  Skin: Negative  Allergic/Immunologic: Negative for environmental allergies  Neurological: Negative  Negative for dizziness  Psychiatric/Behavioral: Negative  Negative for agitation  Current Outpatient Medications   Medication Sig Dispense Refill    Accu-Chek FastClix Lancets MISC USE TO TEST DAILY 102 each 3    Accu-Chek Guide test strip USE TO TEST DAILY 100 strip 3    aspirin 81 MG tablet Take by mouth daily      fexofenadine (ALLEGRA) 180 MG tablet Take 360 mg by mouth daily      Glucosamine-Chondroitin 250-200 MG TABS Take by mouth daily       Kombiglyze XR 5-1000 MG TB24 TAKE 1 TABLET DAILY 90 tablet 3    letrozole (FEMARA) 2 5 mg tablet Take 1 tablet (2 5 mg total) by mouth daily 90 tablet 3    Multiple Vitamins-Iron (QC DAILY MULTIVITAMINS/IRON) TABS Take by mouth daily      nebivolol (BYSTOLIC) 5 mg tablet TAKE 1 TABLET DAILY 90 tablet 3    pravastatin (PRAVACHOL) 10 mg tablet TAKE 1 TABLET DAILY 90 tablet 3    Probiotic Product (PROBIOTIC DAILY) CAPS Take by mouth daily       telmisartan (MICARDIS) 40 mg tablet TAKE 1 TABLET DAILY 90 tablet 3    zinc gluconate 50 mg tablet Take 50 mg by mouth daily       No current facility-administered medications for this visit  Objective:    /76   Pulse 73   Temp (!) 97 4 °F (36 3 °C)   Resp 18   Ht 5' 3" (1 6 m)   Wt 44 9 kg (99 lb)   SpO2 99%   BMI 17 54 kg/m²        Physical Exam  Vitals and nursing note reviewed  Constitutional:       General: She is not in acute distress  Appearance: She is well-developed  She is not diaphoretic  HENT:      Head: Normocephalic and atraumatic  Right Ear: External ear normal       Left Ear: External ear normal       Nose: Nose normal       Mouth/Throat:      Pharynx: No oropharyngeal exudate  Eyes:      General: No scleral icterus  Right eye: No discharge  Left eye: No discharge  Pupils: Pupils are equal, round, and reactive to light  Neck:      Thyroid: No thyromegaly  Cardiovascular:      Rate and Rhythm: Normal rate  Pulses: no weak pulses          Dorsalis pedis pulses are 2+ on the right side and 2+ on the left side  Posterior tibial pulses are 2+ on the right side and 2+ on the left side  Heart sounds: Normal heart sounds  No murmur heard  Pulmonary:      Effort: Pulmonary effort is normal  No respiratory distress  Breath sounds: Normal breath sounds  No wheezing  Abdominal:      General: Bowel sounds are normal  There is no distension  Palpations: Abdomen is soft  There is no mass  Tenderness: There is no abdominal tenderness  There is no guarding or rebound  Musculoskeletal:         General: Normal range of motion  Feet:      Right foot:      Skin integrity: No ulcer, skin breakdown, erythema, warmth, callus or dry skin  Left foot:      Skin integrity: No ulcer, skin breakdown, erythema, warmth, callus or dry skin  Skin:     General: Skin is warm and dry  Findings: No erythema or rash  Neurological:      Mental Status: She is alert        Coordination: Coordination normal       Deep Tendon Reflexes: Reflexes normal    Psychiatric:         Behavior: Behavior normal               Recent Results (from the past 672 hour(s))   Comprehensive metabolic panel    Collection Time: 06/18/22  7:06 AM   Result Value Ref Range    Glucose, Random 114 (H) 65 - 99 mg/dL    BUN 8 7 - 25 mg/dL    Creatinine 0 85 0 50 - 0 99 mg/dL    eGFR Non  74 > OR = 60 mL/min/1 73m2    eGFR  86 > OR = 60 mL/min/1 73m2    SL AMB BUN/CREATININE RATIO NOT APPLICABLE 6 - 22 (calc)    Sodium 134 (L) 135 - 146 mmol/L    Potassium 4 2 3 5 - 5 3 mmol/L    Chloride 98 98 - 110 mmol/L    CO2 28 20 - 32 mmol/L    Calcium 9 6 8 6 - 10 4 mg/dL    Protein, Total 6 9 6 1 - 8 1 g/dL    Albumin 4 2 3 6 - 5 1 g/dL    Globulin 2 7 1 9 - 3 7 g/dL (calc)    Albumin/Globulin Ratio 1 6 1 0 - 2 5 (calc)    TOTAL BILIRUBIN 0 3 0 2 - 1 2 mg/dL    Alkaline Phosphatase 74 37 - 153 U/L    AST 19 10 - 35 U/L    ALT 14 6 - 29 U/L   CBC and differential    Collection Time: 06/18/22  7:06 AM   Result Value Ref Range    White Blood Cell Count 7 7 3 8 - 10 8 Thousand/uL    Red Blood Cell Count 4 61 3 80 - 5 10 Million/uL    Hemoglobin 12 6 11 7 - 15 5 g/dL    HCT 36 8 35 0 - 45 0 %    MCV 79 8 (L) 80 0 - 100 0 fL    MCH 27 3 27 0 - 33 0 pg    MCHC 34 2 32 0 - 36 0 g/dL    RDW 13 3 11 0 - 15 0 %    Platelet Count 292 111 - 400 Thousand/uL    SL AMB MPV 9 2 7 5 - 12 5 fL    Neutrophils (Absolute) 5,113 1,500 - 7,800 cells/uL    Lymphocytes (Absolute) 2,087 850 - 3,900 cells/uL    Monocytes (Absolute) 501 200 - 950 cells/uL    Eosinophils (Absolute) 0 (L) 15 - 500 cells/uL    Basophils ABS 0 0 - 200 cells/uL    Neutrophils 66 4 %    Lymphocytes 27 1 %    Monocytes 6 5 %    Eosinophils 0 0 %    Basophils PCT 0 0 %   Cancer antigen 15-3    Collection Time: 06/18/22  7:06 AM   Result Value Ref Range    CA 15-3 19 <32 U/mL       Collection Time: 06/18/22  7:06 AM   Result Value Ref Range    Cancer Antigen (CA) 125 6 <35 U/mL   Cancer antigen 27 29    Collection Time: 06/18/22  7:06 AM   Result Value Ref Range    CA 27 29 27 <38 U/mL   Lipid Panel with Direct LDL reflex    Collection Time: 06/18/22  7:14 AM   Result Value Ref Range    Total Cholesterol 172 <200 mg/dL    HDL 54 > OR = 50 mg/dL    Triglycerides 138 <150 mg/dL    LDL Calculated 94 mg/dL (calc)    Chol HDLC Ratio 3 2 <5 0 (calc) Non-HDL Cholesterol 118 <130 mg/dL (calc)   Microalbumin, Random Urine (W/Creatinine)    Collection Time: 06/18/22  7:14 AM   Result Value Ref Range    Creatinine, Urine 32 20 - 275 mg/dL    Microalbum  ,U,Random <0 2 See Note: mg/dL    Microalb/Creat Ratio NOTE <30 mcg/mg creat   Hemoglobin A1c (w/out EAG)    Collection Time: 06/18/22  7:14 AM   Result Value Ref Range    Hemoglobin A1C 5 6 <5 7 % of total Hgb     Diabetic Foot Exam    Patient's shoes and socks removed  Right Foot/Ankle   Right Foot Inspection  Skin Exam: skin normal  Skin not intact, no dry skin, no warmth, no callus, no erythema, no maceration, no abnormal color, no pre-ulcer, no ulcer and no callus  Toe Exam: ROM and strength within normal limits  Sensory   Vibration: intact  Proprioception: intact  Monofilament testing: intact    Vascular  Capillary refills: < 3 seconds  The right DP pulse is 2+  The right PT pulse is 2+  Left Foot/Ankle  Left Foot Inspection  Skin Exam: skin normal  Skin not intact, no dry skin, no warmth, no erythema, no maceration, normal color, no pre-ulcer, no ulcer and no callus  Toe Exam: ROM and strength within normal limits  Sensory   Vibration: intact  Proprioception: intact  Monofilament testing: intact    Vascular  Capillary refills: < 3 seconds  The left DP pulse is 2+  The left PT pulse is 2+       Assign Risk Category  No deformity present  No loss of protective sensation  No weak pulses  Risk: 0      Perla Grain, DO

## 2022-06-27 NOTE — PROGRESS NOTES
[facility] [address] [id number]  Oncology Progress Note  Saida Nelson, 1960, [id number]  6/27/2022    Assessment/Plan:   1  Malignant neoplasm of breast in female, estrogen receptor positive, unspecified laterality, unspecified site of breast ([facility] [address] ); 2  Screening mammogram for breast cancer  This is a 71-year-old female with history of recurrent breast cancer locally to patient's chest wall who underwent radiation at the time of reoccurrence  Since then, patient's cancer was considered estrogen sensitive and the patient has been on Femara since January 2008  Most recent mammo WNL- due Nov 2022  Patient has been doing quite well on medication as outlined below  Patient is compliant with follow-up with mammography as well as DEXA scans(due in 11/2023 )    Regimen:  Femara 2 5 mg PO daily     - ; Future  - Cancer antigen 27 29; Future  - Cancer antigen 15-3; Future  - CBC and differential; Future  - Comprehensive metabolic panel; Future  - Mammo screening left w cad; Future    3  Osteopenia  Patient's last DEXA scan demonstrated osteopenia which is stable from previous DEXA completed two years prior  Patient takes a multivitamin with vitamin-D and calcium she does not take any extra supplementation  Patient was given information today regarding dosing of calcium and vitamin-D for good bone structure  This was included on AVS     Recommendations:  Calcium by mouth 600-1200 mg daily  Vitamin-D by mouth 2653-9441 International Units daily    The patient is scheduled for follow-up in approximately 6 months  Patient voiced agreement and understanding to the above  Patient knows to call the Hematology/Oncology office with any questions and concerns regarding the above  Goals and Barriers:    Current Goal:   Prolong Survival from Cancer  Barriers: None        Patient's Capacity to Self Care:  Patient able to self care  Madelyn Fernandes PA-C  Medical Oncology/Hematology  [address]  ______________________________________________________________________________________________________________    Subjective     Chief Complaint   Patient presents with    Follow-up     Malignant neoplasm of breast in female, estrogen receptor positive, unspecified laterality, unspecified site of breast ([address] )       History of present illness/Cancer History: This is a 71-year-old female with prior history of breast cancer with two recurrences  See below information as copied from Dr Israel Hills, 12/21/20 note:   "22 XQOBLV with a complicated past breast cancer history back for follow-up  Patient was first diagnosed in 2003 and underwent a right modified radical mastectomy in December 2003  Pathology results from [facility] in [address] [place] [address] demonstrated invasive ductal carcinoma, 0 5 cm with focal lymphovascular invasion  There was a second right breast mass also invasive, 4 0 by 2 5 cm, moderately differentiated  Patient also had focal DCIS, margins were free of tumor  Right axillary contents demonstrated 4/17 positive lymph nodes  Final stage was IIIA (pT2 pN2a M0)  Patient states that she refused adjuvant chemotherapy at that time  It is never been clear why patient was not offered adjuvant hormonal manipulation       Subsequently patient moved to [place] and in 2007 began to have pain in her right shoulder  Workup demonstrated 2 nodules within the shoulder muscle  One nodule was removed and demonstrated metastatic carcinoma  Patient underwent radiotherapy without complications and was placed on tamoxifen - completed 5 years (started in January 2008)  Patient subsequently began Femara and has completed 6+ years of Femara  "    CA 27 29   Date Value Ref Range Status   06/18/2022 27 <38 U/mL Final     Comment: This test was performed using the Siemens  Chemiluminescent method  Values obtained from  different assay methods cannot be used  interchangeably  CA 27 29 levels, regardless of  value, should not be interpreted as absolute  evidence of the presence or absence of disease  12/11/2021 28 <38 U/mL Final     Comment: This test was performed using the Siemens  Chemiluminescent method  Values obtained from  different assay methods cannot be used  interchangeably  CA 27 29 levels, regardless of  value, should not be interpreted as absolute  evidence of the presence or absence of disease  06/19/2021 27 <38 U/mL Final     Comment: This test was performed using the Siemens  Chemiluminescent method  Values obtained from  different assay methods cannot be used  interchangeably  CA 27 29 levels, regardless of  value, should not be interpreted as absolute  evidence of the presence or absence of disease  CA 15-3   Date Value Ref Range Status   06/18/2022 19 <32 U/mL Final     Comment: This test was performed using the Siemens [employer])  chemiluminescent method  Values obtained from  different assay methods cannot be used  interchangeably  CA 15-3 levels, regardless of  value, should not be interpreted as absolute  evidence of the presence or absence of disease  12/11/2021 16 <32 U/mL Final     Comment: This test was performed using the Siem[employer])  chemiluminescent method  Values obtained from  different assay methods cannot be used  interchangeably  CA 15-3 levels, regardless of  value, should not be interpreted as absolute  evidence of the presence or absence of disease  06/19/2021 13 <32 U/mL Final     Comment: This test was performed using the [employer])  chemiluminescent method  Values obtained from  different assay methods cannot be used  interchangeably  CA 15-3 levels, regardless of  value, should not be interpreted as absolute  evidence of the presence or absence of disease       01/20/2018 14 <32 U/mL Final     Comment: Result Comment: This test was performed using the [employer])           chemiluminescent method  Values obtained from different           assay methods cannot be used interchangeably  CA 15-3           levels, regardless of value, should not be interpreted           as absolute evidence of the presence or absence of disease  Performed at: Cirilo Nichols MD  [address] [place] [address]     2017 19 <32 U/mL Final     Comment:     Result Comment: This test was performed using the [employer])           chemiluminescent method  Values obtained from different           assay methods cannot be used interchangeably  CA 15-3           levels, regardless of value, should not be interpreted           as absolute evidence of the presence or absence of disease  Performed at: Cirilo Nichols MD  [address] [place] [address]     2017 19 <32 U/mL Final     Comment:     Result Comment: This test was performed using the [employer])           chemiluminescent method  Values obtained from different           assay methods cannot be used interchangeably  CA 15-3           levels, regardless of value, should not be interpreted           as absolute evidence of the presence or absence of disease  Performed at: Cirilo Nichols MD  [address] [place] [address]         Interval history:  No major interval changes  Patient notes that she still has some range of motion restrictions in the right arm secondary to radiotherapy and prior surgery  Patient overall is doing well on Femara no side effects- requests refill  Otherwise, patient is up-to-date on DEXA scans and mammography  Patient notes that her dietary habits are excellent, no issues with comorbidities       ECO - Asymptomatic    Review of Systems   Psychiatric/Behavioral:        Apprehension of changing in front of other women All other systems reviewed and are negative  Current Outpatient Medications:     Accu-Chek FastClix Lancets MISC, USE TO TEST DAILY, Disp: 102 each, Rfl: 3    Accu-Chek Guide test strip, USE TO TEST DAILY, Disp: 100 strip, Rfl: 3    aspirin 81 MG tablet, Take by mouth daily, Disp: , Rfl:     fexofenadine (ALLEGRA) 180 MG tablet, Take 360 mg by mouth daily, Disp: , Rfl:     Glucosamine-Chondroitin 250-200 MG TABS, Take by mouth daily , Disp: , Rfl:     Kombiglyze XR 5-1000 MG TB24, TAKE 1 TABLET DAILY, Disp: 90 tablet, Rfl: 3    letrozole (FEMARA) 2 5 mg tablet, Take 1 tablet (2 5 mg total) by mouth daily, Disp: 90 tablet, Rfl: 1    Multiple Vitamins-Iron (QC DAILY MULTIVITAMINS/IRON) TABS, Take by mouth daily, Disp: , Rfl:     nebivolol (BYSTOLIC) 5 mg tablet, TAKE 1 TABLET DAILY, Disp: 90 tablet, Rfl: 3    pravastatin (PRAVACHOL) 10 mg tablet, TAKE 1 TABLET DAILY, Disp: 90 tablet, Rfl: 3    Probiotic Product (PROBIOTIC DAILY) CAPS, Take by mouth daily , Disp: , Rfl:     telmisartan (MICARDIS) 40 mg tablet, TAKE 1 TABLET DAILY, Disp: 90 tablet, Rfl: 3    zinc gluconate 50 mg tablet, Take 50 mg by mouth daily, Disp: , Rfl:     Cholecalciferol (VITAMIN D3) LIQD, , Disp: , Rfl:     Ferrous Sulfate (IRON) 325 (65 Fe) MG TABS, Take by mouth daily, Disp: , Rfl:     omega-3-acid ethyl esters (LOVAZA) 1 g capsule, Take 1 capsule (1 g total) by mouth 2 (two) times a day, Disp: 60 capsule, Rfl: 1  Allergies   Allergen Reactions    Erythromycin Swelling     Reaction Date: 23Aug2007;     Penicillins Hives     Reaction Date: 23Aug2007;     Codeine Nausea Only     Reaction Date: 23Aug2007;     Sulfamethoxazole-Trimethoprim Hives    Sulfate Rash and Fever     Reaction Date: 23Aug2007;         Advance Directive and Living Will:            Objective   /66 (BP Location: Left arm, Patient Position: Sitting, Cuff Size: Adult)   Pulse 78   Temp 98 4 °F (36 9 °C) (Temporal)   Resp 16   Ht 5' 3" (1 6 m)   Wt 45 2 kg (99 lb 9 6 oz)   SpO2 100%   BMI 17 64 kg/m²   Wt Readings from Last 6 Encounters:   06/27/22 45 2 kg (99 lb 9 6 oz)   05/19/22 45 kg (99 lb 1 6 oz)   12/27/21 45 8 kg (101 lb)   12/27/21 45 8 kg (101 lb)   11/19/21 45 4 kg (100 lb)   09/30/21 45 4 kg (100 lb)       Physical Exam  Exam conducted with a chaperone present (Mariano Sheikh MA)  Constitutional:       General: She is not in acute distress  Appearance: She is well-developed  HENT:      Head: Normocephalic and atraumatic  Eyes:      General: No scleral icterus  Pupils: Pupils are equal, round, and reactive to light  Cardiovascular:      Rate and Rhythm: Normal rate and regular rhythm  Heart sounds: No murmur heard  Pulmonary:      Effort: Pulmonary effort is normal  No respiratory distress  Chest:      Chest wall: No mass  Breasts:      Right: Absent  No axillary adenopathy or supraclavicular adenopathy  Left: No mass, skin change, tenderness, axillary adenopathy or supraclavicular adenopathy  Lymphadenopathy:      Upper Body:      Right upper body: No supraclavicular or axillary adenopathy  Left upper body: No supraclavicular, axillary or pectoral adenopathy  Skin:     General: Skin is warm  Coloration: Skin is not pale  Findings: No rash  Neurological:      Mental Status: She is alert and oriented to person, place, and time  Psychiatric:         Thought Content: Thought content normal          Pertinent Laboratory Results and Imaging Review:  Orders Only on 06/18/2022   Component Date Value Ref Range Status    Total Cholesterol 06/18/2022 172  <200 mg/dL Final    HDL 06/18/2022 54  > OR = 50 mg/dL Final    Triglycerides 06/18/2022 138  <150 mg/dL Final    LDL Calculated 06/18/2022 94  mg/dL (calc) Final    Comment: Reference range: <100     Desirable range <100 mg/dL for primary prevention;    <70 mg/dL for patients with CHD or diabetic patients   with > or = 2 CHD risk factors  LDL-C is now calculated using the Ty-Caicedo   calculation, which is a validated novel method providing   better accuracy than the Friedewald equation in the   estimation of LDL-C  Estiven Rasheed  [facility]  1941;441(15): [id number]   (http://Cheggin/faq/BJQ550)      Chol HDLC Ratio 06/18/2022 3 2  <5 0 (calc) Final    Non-HDL Cholesterol 06/18/2022 118  <130 mg/dL (calc) Final    Comment: For patients with diabetes plus 1 major ASCVD risk   factor, treating to a non-HDL-C goal of <100 mg/dL   (LDL-C of <70 mg/dL) is considered a therapeutic   option   Creatinine, Urine 06/18/2022 32  20 - 275 mg/dL Final    Microalbum  ,U,Random 06/18/2022 <0 2  See Note: mg/dL Final    Comment: Reference Range:    Reference Range  Not established      Microalb/Creat Ratio 06/18/2022 NOTE  <30 mcg/mg creat Final    Comment: NOTE: The urine albumin value is less than   0 2 mg/dL therefore we are unable to calculate   excretion and/or creatinine ratio  The ADA defines abnormalities in albumin  excretion as follows: Albuminuria Category        Result (mcg/mg creatinine)     Normal to Mildly increased   <30  Moderately increased            Severely increased           > OR = 300     The ADA recommends that at least two of three  specimens collected within a 3-6 month period be  abnormal before considering a patient to be  within a diagnostic category   Hemoglobin A1C 06/18/2022 5 6  <5 7 % of total Hgb Final    Comment: For the purpose of screening for the presence of  diabetes:     <5 7%       Consistent with the absence of diabetes  5 7-6 4%    Consistent with increased risk for diabetes              (prediabetes)  > or =6 5%  Consistent with diabetes     This assay result is consistent with a decreased risk  of diabetes  Currently, no consensus exists regarding use of  hemoglobin A1c for diagnosis of diabetes in children       According to American Diabetes Association (ADA)  guidelines, hemoglobin A1c <7 0% represents optimal  control in non-pregnant diabetic patients  Different  metrics may apply to specific patient populations  Standards of Medical Care in Diabetes(ADA)  Orders Only on 06/18/2022   Component Date Value Ref Range Status    Glucose, Random 06/18/2022 114 (A) 65 - 99 mg/dL Final    Comment:               Fasting reference interval     For someone without known diabetes, a glucose value  between 100 and 125 mg/dL is consistent with  prediabetes and should be confirmed with a  follow-up test          BUN 06/18/2022 8  7 - 25 mg/dL Final    Creatinine 06/18/2022 0 85  0 50 - 0 99 mg/dL Final    Comment: For patients >52years of age, the reference limit  for Creatinine is approximately 13% higher for people  identified as -American           eGFR Non  06/18/2022 74  > OR = 60 mL/min/1 73m2 Final    eGFR  06/18/2022 86  > OR = 60 mL/min/1 73m2 Final    SL AMB BUN/CREATININE RATIO 56/88/8893 NOT APPLICABLE  6 - 22 (calc) Final    Sodium 06/18/2022 134 (A) 135 - 146 mmol/L Final    Potassium 06/18/2022 4 2  3 5 - 5 3 mmol/L Final    Chloride 06/18/2022 98  98 - 110 mmol/L Final    CO2 06/18/2022 28  20 - 32 mmol/L Final    Calcium 06/18/2022 9 6  8 6 - 10 4 mg/dL Final    Protein, Total 06/18/2022 6 9  6 1 - 8 1 g/dL Final    Albumin 06/18/2022 4 2  3 6 - 5 1 g/dL Final    Globulin 06/18/2022 2 7  1 9 - 3 7 g/dL (calc) Final    Albumin/Globulin Ratio 06/18/2022 1 6  1 0 - 2 5 (calc) Final    TOTAL BILIRUBIN 06/18/2022 0 3  0 2 - 1 2 mg/dL Final    Alkaline Phosphatase 06/18/2022 74  37 - 153 U/L Final    AST 06/18/2022 19  10 - 35 U/L Final    ALT 06/18/2022 14  6 - 29 U/L Final    White Blood Cell Count 06/18/2022 7 7  3 8 - 10 8 Thousand/uL Final    Red Blood Cell Count 06/18/2022 4 61  3 80 - 5 10 Million/uL Final    Hemoglobin 06/18/2022 12 6  11 7 - 15 5 g/dL Final    HCT 06/18/2022 36 8  35 0 - 45 0 % Final    MCV 06/18/2022 79 8 (A) 80 0 - 100 0 fL Final    MCH 06/18/2022 27 3  27 0 - 33 0 pg Final    MCHC 06/18/2022 34 2  32 0 - 36 0 g/dL Final    RDW 06/18/2022 13 3  11 0 - 15 0 % Final    Platelet Count 04/24/8270 211  140 - 400 Thousand/uL Final    SL AMB MPV 06/18/2022 9 2  7 5 - 12 5 fL Final    Neutrophils (Absolute) 06/18/2022 5,113  1,500 - 7,800 cells/uL Final    Lymphocytes (Absolute) 06/18/2022 2,087  850 - 3,900 cells/uL Final    Monocytes (Absolute) 06/18/2022 501  200 - 950 cells/uL Final    Eosinophils (Absolute) 06/18/2022 0 (A) 15 - 500 cells/uL Final    Basophils ABS 06/18/2022 0  0 - 200 cells/uL Final    Neutrophils 06/18/2022 66 4  % Final    Lymphocytes 06/18/2022 27 1  % Final    Monocytes 06/18/2022 6 5  % Final    Eosinophils 06/18/2022 0 0  % Final    Basophils PCT 06/18/2022 0 0  % Final    CA 15-3 06/18/2022 19  <32 U/mL Final    Comment: This test was performed using the Siem[employer])  chemiluminescent method  Values obtained from  different assay methods cannot be used  interchangeably  CA 15-3 levels, regardless of  value, should not be interpreted as absolute  evidence of the presence or absence of disease   Cancer Antigen (CA) 125 06/18/2022 6  <35 U/mL Final    Comment: This test was performed using the Siemens   Chemiluminescent method  Values obtained from  different assay methods cannot be used   interchangeably   levels, regardless of   value, should not be interpreted as absolute  evidence of the presence or absence of disease  Effective March 14, 2022, the Siemens    immunoassay will replace the previous magnify360   method  For patients followed by serial  testing,  order code [id number] - , Re-baseline will be  available through June 6, 2022 to assist with   transition to the new assay   CA 27 29 06/18/2022 27  <38 U/mL Final    Comment:     This test was performed using the Siemens  Chemiluminescent method  Values obtained from  different assay methods cannot be used  interchangeably  CA 27 29 levels, regardless of  value, should not be interpreted as absolute  evidence of the presence or absence of disease  DXA bone density spine hip and pelvis  Narrative: CENTRAL  DXA SCAN    CLINICAL HISTORY:  62-year-old postmenopausal female  OTHER RISK FACTORS:  Diabetes, Femara therapy  PHARMACOLOGIC THERAPY FOR OSTEOPOROSIS:  None  TECHNIQUE: Bone densitometry was performed using a LoSoXA  bone densitometer  Regions of interest appear properly placed  COMPARISON: There are no prior DXA studies performed on this unit for comparison  RESULTS:     LUMBAR SPINE L1, L2, L4 (L3 vertebra excluded from analysis due to local structural abnormalities or artifact): BMD  0 979  gm/cm2   T-score -1 9     LEFT TOTAL HIP:   BMD: 1 0 21  gm/cm2   T-score: 0 1     LEFT FEMORAL NECK:   BMD: 0 975  gm/cm2   T score: -0 5     RIGHT TOTAL HIP:   BMD:  1 005  gm/cm2   T-score: 0 0    RIGHT FEMORAL NECK:   BMD:  0 963  gm/cm2    T score: -0 5   Impression: 1  Low bone mass (osteopenia)  2   The 10 year risk of hip fracture is 0 2% with the 10 year risk of major osteoporotic fracture being 5 4% as calculated by the [facility]/WHO fracture risk assessment tool (FRAX)  3   The current NOF guidelines recommend treating patients with a T-score of -2 5 or less in the lumbar spine or hips, or in post-menopausal women and men over the age of 48 with low bone mass (osteopenia) and a FRAX 10 year risk score of >3% for hip   fracture and/or >20% for major osteoporotic fracture  4   The NOF recommends follow-up DXA in 1-2 years after initiating therapy for osteoporosis and every 2 years thereafter  More frequent evaluation is appropriate for patients with conditions associated with rapid bone loss, such as glucocorticoid   therapy   The interval between DXA screenings may be longer for individuals without major risk factors and initial T-score in the normal or upper low bone mass range  The FRAX algorithm has certain limitations:  -FRAX has not been validated in patients currently or previously treated with pharmacotherapy for osteoporosis  In such patients, clinical judgment must be exercised in interpreting FRAX scores  -Prior hip, vertebral and humeral fragility fractures appear to confer greater risk of subsequent fracture than fractures at other sites (this is especially true for individuals with severe vertebral fractures), but quantification of this incremental   risk is not possible with FRAX  -FRAX underestimates fracture risk in patients with history of multiple fragility fractures  -FRAX may underestimate fracture risk in patients with history of frequent falls   -It is not appropriate to use FRAX to monitor treatment response      WHO CLASSIFICATION:  Normal (a T-score of -1 0 or higher)  Low bone mineral density (a T-score of less than -1 0 but higher than -2 5)  Osteoporosis (a T-score of -2 5 or less)  Severe osteoporosis (a T-score of -2 5 or less with a fragility fracture)    Workstation performed: [id number]        The following historical data was reviewed:  Past Medical History:   Diagnosis Date    Abnormal liver function test     Acute upper respiratory infection 04/17/2008    Breast cancer ([facility] [address] ) 12/01/2003    Cancer ([facility] [address] )     Cough 08/07/2008    [facility]     History of acute sinusitis 09/28/2010    History of diarrhea     History of hypertension     History of motion sickness 08/07/2008    History of orthopnea 01/10/2008     Past Surgical History:   Procedure Laterality Date    [place] LUM[place]Y [place]07[place]   [employer] MASTECTOMY Right 12/01/2003    TOOTH EXTRACTION       Social History     Socioeconomic History    Marital status: /Civil Union     Spouse name: None    Number of children: None    Years of education: None    Highest education level: None   Occupational History    None   Tobacco Use    Smoking status: Never Smoker    Smokeless tobacco: Never Used   Vaping Use    Vaping Use: Never used   Substance and Sexual Activity    Alcohol use: Not Currently    Drug use: No    Sexual activity: Not Currently     Partners: Male   Other Topics Concern    None   Social History Narrative    None     Social Determinants of Health     Financial Resource Strain: Not on file   Food Insecurity: Not on file   Transportation Needs: Not on file   Physical Activity: Not on file   Stress: Not on file   Social Connections: Not on file   Intimate Partner Violence: Not on file   Housing Stability: Not on file     Family History   Problem Relation Age of Onset    Hypertension Father             Diabetes Family     Heart disease Family     Parkinsonism Mother     Dementia Mother     No Known Problems Sister     No Known Problems Daughter     No Known Problems Daughter     Mental illness Neg Hx        Please note: This report has been generated by a voice recognition software system  Therefore there may be syntax, spelling, and/or grammatical errors  Please call if you have any questions

## 2022-08-29 DIAGNOSIS — E11.3293 TYPE 2 DIABETES MELLITUS WITH BOTH EYES AFFECTED BY MILD NONPROLIFERATIVE RETINOPATHY WITHOUT MACULAR EDEMA, WITHOUT LONG-TERM CURRENT USE OF INSULIN (HCC): ICD-10-CM

## 2022-08-29 RX ORDER — BLOOD SUGAR DIAGNOSTIC
STRIP MISCELLANEOUS
Qty: 100 STRIP | Refills: 3 | Status: SHIPPED | OUTPATIENT
Start: 2022-08-29

## 2022-08-29 RX ORDER — LANCETS
EACH MISCELLANEOUS
Qty: 102 EACH | Refills: 3 | Status: SHIPPED | OUTPATIENT
Start: 2022-08-29

## 2022-09-06 DIAGNOSIS — I10 BENIGN ESSENTIAL HYPERTENSION: ICD-10-CM

## 2022-09-06 RX ORDER — NEBIVOLOL 5 MG/1
5 TABLET ORAL DAILY
Qty: 90 TABLET | Refills: 3 | Status: SHIPPED | OUTPATIENT
Start: 2022-09-06

## 2022-10-10 ENCOUNTER — VBI (OUTPATIENT)
Dept: ADMINISTRATIVE | Facility: OTHER | Age: 62
End: 2022-10-10

## 2022-10-10 LAB
LEFT EYE DIABETIC RETINOPATHY: NORMAL
RIGHT EYE DIABETIC RETINOPATHY: NORMAL

## 2022-10-11 NOTE — TELEPHONE ENCOUNTER
Upon review of the In Basket request we were able to locate, review, and update the patient chart as requested for Diabetic Eye Exam     Any additional questions or concerns should be emailed to the Practice Liaisons via the appropriate education email address, please do not reply via In Basket      Thank you  Amber Rivera

## 2022-10-15 DIAGNOSIS — C50.919 MALIGNANT NEOPLASM OF BREAST IN FEMALE, ESTROGEN RECEPTOR POSITIVE, UNSPECIFIED LATERALITY, UNSPECIFIED SITE OF BREAST (HCC): ICD-10-CM

## 2022-10-15 DIAGNOSIS — Z17.0 MALIGNANT NEOPLASM OF BREAST IN FEMALE, ESTROGEN RECEPTOR POSITIVE, UNSPECIFIED LATERALITY, UNSPECIFIED SITE OF BREAST (HCC): ICD-10-CM

## 2022-10-17 RX ORDER — LETROZOLE 2.5 MG/1
2.5 TABLET, FILM COATED ORAL DAILY
Qty: 90 TABLET | Refills: 0 | Status: SHIPPED | OUTPATIENT
Start: 2022-10-17

## 2022-11-25 ENCOUNTER — HOSPITAL ENCOUNTER (OUTPATIENT)
Dept: RADIOLOGY | Facility: HOSPITAL | Age: 62
Discharge: HOME | End: 2022-11-25

## 2022-11-25 VITALS — HEIGHT: 63 IN | BODY MASS INDEX: 17.54 KG/M2 | WEIGHT: 99 LBS

## 2022-11-25 DIAGNOSIS — Z12.31 SCREENING MAMMOGRAM FOR BREAST CANCER: ICD-10-CM

## 2022-12-11 LAB
ALBUMIN SERPL-MCNC: 3.9 G/DL (ref 3.6–5.1)
ALBUMIN/GLOB SERPL: 1.4 (CALC) (ref 1–2.5)
ALP SERPL-CCNC: 68 U/L (ref 37–153)
ALT SERPL-CCNC: 10 U/L (ref 6–29)
AST SERPL-CCNC: 18 U/L (ref 10–35)
BASOPHILS # BLD AUTO: 0 CELLS/UL (ref 0–200)
BASOPHILS NFR BLD AUTO: 0 %
BILIRUB SERPL-MCNC: 0.4 MG/DL (ref 0.2–1.2)
BUN SERPL-MCNC: 8 MG/DL (ref 7–25)
BUN/CREAT SERPL: ABNORMAL (CALC) (ref 6–22)
CALCIUM SERPL-MCNC: 9.4 MG/DL (ref 8.6–10.4)
CANCER AG125 SERPL-ACNC: 6 U/ML
CANCER AG15-3 SERPL-ACNC: 18 U/ML
CANCER AG27-29 SERPL-ACNC: 28 U/ML
CHLORIDE SERPL-SCNC: 99 MMOL/L (ref 98–110)
CHOLEST SERPL-MCNC: 173 MG/DL
CHOLEST/HDLC SERPL: 3 (CALC)
CO2 SERPL-SCNC: 29 MMOL/L (ref 20–32)
CREAT SERPL-MCNC: 0.86 MG/DL (ref 0.5–1.05)
EOSINOPHIL # BLD AUTO: 0 CELLS/UL (ref 15–500)
EOSINOPHIL NFR BLD AUTO: 0 %
ERYTHROCYTE [DISTWIDTH] IN BLOOD BY AUTOMATED COUNT: 14.4 % (ref 11–15)
GFR/BSA.PRED SERPLBLD CYS-BASED-ARV: 77 ML/MIN/1.73M2
GLOBULIN SER CALC-MCNC: 2.8 G/DL (CALC) (ref 1.9–3.7)
GLUCOSE SERPL-MCNC: 121 MG/DL (ref 65–99)
HBA1C MFR BLD: 5.6 % OF TOTAL HGB
HCT VFR BLD AUTO: 36.2 % (ref 35–45)
HDLC SERPL-MCNC: 57 MG/DL
HGB BLD-MCNC: 11.9 G/DL (ref 11.7–15.5)
LDLC SERPL CALC-MCNC: 97 MG/DL (CALC)
LYMPHOCYTES # BLD AUTO: 1798 CELLS/UL (ref 850–3900)
LYMPHOCYTES NFR BLD AUTO: 28.1 %
MCH RBC QN AUTO: 26.7 PG (ref 27–33)
MCHC RBC AUTO-ENTMCNC: 32.9 G/DL (ref 32–36)
MCV RBC AUTO: 81.3 FL (ref 80–100)
MONOCYTES # BLD AUTO: 467 CELLS/UL (ref 200–950)
MONOCYTES NFR BLD AUTO: 7.3 %
NEUTROPHILS # BLD AUTO: 4134 CELLS/UL (ref 1500–7800)
NEUTROPHILS NFR BLD AUTO: 64.6 %
NONHDLC SERPL-MCNC: 116 MG/DL (CALC)
PLATELET # BLD AUTO: 237 THOUSAND/UL (ref 140–400)
PMV BLD REES-ECKER: 9.2 FL (ref 7.5–12.5)
POTASSIUM SERPL-SCNC: 4.8 MMOL/L (ref 3.5–5.3)
PROT SERPL-MCNC: 6.7 G/DL (ref 6.1–8.1)
RBC # BLD AUTO: 4.45 MILLION/UL (ref 3.8–5.1)
SODIUM SERPL-SCNC: 134 MMOL/L (ref 135–146)
TRIGL SERPL-MCNC: 91 MG/DL
WBC # BLD AUTO: 6.4 THOUSAND/UL (ref 3.8–10.8)

## 2022-12-16 ENCOUNTER — TELEPHONE (OUTPATIENT)
Dept: HEMATOLOGY ONCOLOGY | Facility: CLINIC | Age: 62
End: 2022-12-16

## 2022-12-16 ENCOUNTER — OFFICE VISIT (OUTPATIENT)
Dept: FAMILY MEDICINE CLINIC | Facility: CLINIC | Age: 62
End: 2022-12-16

## 2022-12-16 VITALS
WEIGHT: 98.2 LBS | HEART RATE: 97 BPM | SYSTOLIC BLOOD PRESSURE: 136 MMHG | OXYGEN SATURATION: 100 % | DIASTOLIC BLOOD PRESSURE: 68 MMHG | BODY MASS INDEX: 17.4 KG/M2 | HEIGHT: 63 IN | RESPIRATION RATE: 18 BRPM | TEMPERATURE: 97.2 F

## 2022-12-16 DIAGNOSIS — I10 BENIGN ESSENTIAL HYPERTENSION: ICD-10-CM

## 2022-12-16 DIAGNOSIS — E11.3293 TYPE 2 DIABETES MELLITUS WITH BOTH EYES AFFECTED BY MILD NONPROLIFERATIVE RETINOPATHY WITHOUT MACULAR EDEMA, WITHOUT LONG-TERM CURRENT USE OF INSULIN (HCC): Primary | ICD-10-CM

## 2022-12-16 DIAGNOSIS — C50.911 ADENOCARCINOMA OF RIGHT BREAST (HCC): ICD-10-CM

## 2022-12-16 RX ORDER — EFINACONAZOLE 100 MG/ML
SOLUTION TOPICAL
COMMUNITY
Start: 2022-10-05

## 2022-12-16 NOTE — ASSESSMENT & PLAN NOTE
Stable on current medications, however Kombiglyze is expensive  She will at next f/u with PCP    Labs ordered to be done prior   Lab Results   Component Value Date    HGBA1C 5 6 12/10/2022

## 2022-12-16 NOTE — TELEPHONE ENCOUNTER
Patient calling to confirm that she can come to the [facility] office today to  a paper copy of her lab orders to have them done at [address]  Confirmed with Samuel MATHIS   That patient is able to do so and that they will be in office until 1PM    Relayed this to patient and patient voiced understanding and confirmed she will be coming today before 12PM

## 2022-12-16 NOTE — PROGRESS NOTES
Assessment/Plan:    1  Type 2 diabetes mellitus with both eyes affected by mild nonproliferative retinopathy without macular edema, without long-term current use of insulin (HCC)  Assessment & Plan:  Stable on current medications, however Kombiglyze is expensive  She will at next f/u with PCP  Labs ordered to be done prior   Lab Results   Component Value Date    HGBA1C 5 6 12/10/2022       Orders:  -     Comprehensive metabolic panel; Future; Expected date: 06/09/2023  -     Lipid panel; Future; Expected date: 06/09/2023  -     TSH, 3rd generation with Free T4 reflex; Future; Expected date: 06/09/2023  -     Hemoglobin A1c (w/out EAG); Future; Expected date: 06/09/2023  -     Microalbumin, Random Urine (W/Creatinine); Future; Expected date: 06/09/2023  -     Comprehensive metabolic panel  -     Lipid panel  -     TSH, 3rd generation with Free T4 reflex    2  Benign essential hypertension  Assessment & Plan:  Stable on current medications     Orders:  -     Comprehensive metabolic panel; Future; Expected date: 06/09/2023  -     Lipid panel; Future; Expected date: 06/09/2023  -     TSH, 3rd generation with Free T4 reflex; Future; Expected date: 06/09/2023  -     Hemoglobin A1c (w/out EAG); Future; Expected date: 06/09/2023  -     Microalbumin, Random Urine (W/Creatinine); Future; Expected date: 06/09/2023  -     Comprehensive metabolic panel  -     Lipid panel  -     TSH, 3rd generation with Free T4 reflex    3  Adenocarcinoma of right breast [facility])  Assessment & Plan:  She is on Femara, management per hem/onc              There are no Patient Instructions on file for this visit  Return in about 6 months (around 6/16/2023)  Subjective:      Patient ID: Sonia Martinez is a 65 y o  female  Chief Complaint   Patient presents with   • Follow-up     Review labs  jmcma       Following up on labs  No concerns today    Compliant with medications        The following portions of the patient's history were reviewed and updated as appropriate: allergies, current medications, past family history, past medical history, past social history, past surgical history and problem list     Review of Systems   Constitutional: Negative  Respiratory: Negative  Gastrointestinal: Negative  Psychiatric/Behavioral: Negative  Current Outpatient Medications   Medication Sig Dispense Refill   • Accu-Chek FastClix Lancets MISC USE TO TEST DAILY 102 each 3   • Accu-Chek Guide test strip USE TO TEST DAILY 100 strip 3   • aspirin 81 MG tablet Take by mouth daily     • fexofenadine (ALLEGRA) 180 MG tablet Take 360 mg by mouth daily     • Glucosamine-Chondroitin 250-200 MG TABS Take by mouth daily      • Kombiglyze XR 5-1000 MG TB24 TAKE 1 TABLET DAILY 90 tablet 3   • letrozole (FEMARA) 2 5 mg tablet Take 1 tablet (2 5 mg total) by mouth daily 90 tablet 0   • Multiple Vitamins-Iron (QC DAILY MULTIVITAMINS/IRON) TABS Take by mouth daily     • nebivolol (BYSTOLIC) 5 mg tablet Take 1 tablet (5 mg total) by mouth daily 90 tablet 3   • pravastatin (PRAVACHOL) 10 mg tablet TAKE 1 TABLET DAILY 90 tablet 3   • Probiotic Product (PROBIOTIC DAILY) CAPS Take by mouth daily      • telmisartan (MICARDIS) 40 mg tablet TAKE 1 TABLET DAILY 90 tablet 3   • zinc gluconate 50 mg tablet Take 50 mg by mouth daily     • Jublia 10 % SOLN        No current facility-administered medications for this visit  Objective:    /68   Pulse 97   Temp (!) 97 2 °F (36 2 °C)   Resp 18   Ht 5' 3" (1 6 m)   Wt 44 5 kg (98 lb 3 2 oz)   SpO2 100%   BMI 17 40 kg/m²        Physical Exam  Vitals and nursing note reviewed  Constitutional:       Appearance: Normal appearance  She is well-developed  Cardiovascular:      Rate and Rhythm: Normal rate and regular rhythm  Pulses: Normal pulses  Heart sounds: Normal heart sounds  No murmur heard  Pulmonary:      Effort: Pulmonary effort is normal       Breath sounds: Normal breath sounds     Skin: General: Skin is warm and dry  Neurological:      Mental Status: She is alert     Psychiatric:         Mood and Affect: Mood normal          Behavior: Behavior normal                 Santiago Runner, CRNP

## 2022-12-16 NOTE — TELEPHONE ENCOUNTER
Appointment Cancellation Or Reschedule     Person calling in Patient    If other than patient calling, are they listed on the communication consent form? Provider Alfredo Sullivan   Office Visit Date and Time 12/19 at [facility] Visit Location [facility]   Did patient want to reschedule their office appointment? If so, when was it scheduled to? no   Did you have STAR scheduled for this appointment? no   Do you need STAR set up for your new appointment? If yes, please send to "PATIENT RIDESHARE" pool for STAR rescheduling no   If you are cancelling appointment, can we notify STAR to cancel ride? If yes, please send to "PATIENT RIDESHARE" pool for STAR to cancel service no   Is this patient calling to reschedule an infusion appointment? no   When is their next infusion appointment? n/a   Is this patient a Chemo patient? no   Reason for Cancellation or Reschedule Patient's work schedule      If the patient is a treatment patient, please route this to the office nurse  If the patient is not on treatment, please route to the office MA  If the patient is a surgical oncology patient, please route to surg/onc clinical pool

## 2022-12-20 ENCOUNTER — TELEPHONE (OUTPATIENT)
Dept: HEMATOLOGY ONCOLOGY | Facility: CLINIC | Age: 62
End: 2022-12-20

## 2022-12-20 NOTE — TELEPHONE ENCOUNTER
Scheduling Appointment SEND TO \[facility]\""    Who Is Calling to Schedule Patient    Doctor Dr Carlita Vazquez   Location [facility]   Date and Time 06/19 at 10:40am    Reason for scheduling appointment Follow up    Patient verbalized understanding    yes

## 2022-12-29 DIAGNOSIS — C50.919 MALIGNANT NEOPLASM OF BREAST IN FEMALE, ESTROGEN RECEPTOR POSITIVE, UNSPECIFIED LATERALITY, UNSPECIFIED SITE OF BREAST (HCC): ICD-10-CM

## 2022-12-29 DIAGNOSIS — Z17.0 MALIGNANT NEOPLASM OF BREAST IN FEMALE, ESTROGEN RECEPTOR POSITIVE, UNSPECIFIED LATERALITY, UNSPECIFIED SITE OF BREAST (HCC): ICD-10-CM

## 2022-12-29 NOTE — TELEPHONE ENCOUNTER
Medication Refill Request     Name Femara 2 5 mg tablet  Dose/Frequency 1 tablet by mouth daily  Quantity 90 tablet  Verified pharmacy   [x]  Verified ordering Provider   [x]  Does patient have enough for the next 3 days?  Yes [] No []

## 2022-12-30 RX ORDER — LETROZOLE 2.5 MG/1
2.5 TABLET, FILM COATED ORAL DAILY
Qty: 90 TABLET | Refills: 0 | Status: SHIPPED | OUTPATIENT
Start: 2022-12-30

## 2023-03-12 DIAGNOSIS — Z17.0 MALIGNANT NEOPLASM OF BREAST IN FEMALE, ESTROGEN RECEPTOR POSITIVE, UNSPECIFIED LATERALITY, UNSPECIFIED SITE OF BREAST (HCC): ICD-10-CM

## 2023-03-12 DIAGNOSIS — C50.919 MALIGNANT NEOPLASM OF BREAST IN FEMALE, ESTROGEN RECEPTOR POSITIVE, UNSPECIFIED LATERALITY, UNSPECIFIED SITE OF BREAST (HCC): ICD-10-CM

## 2023-03-13 RX ORDER — LETROZOLE 2.5 MG/1
TABLET, FILM COATED ORAL
Qty: 90 TABLET | Refills: 0 | Status: SHIPPED | OUTPATIENT
Start: 2023-03-13

## 2023-05-22 ENCOUNTER — OFFICE VISIT (OUTPATIENT)
Dept: CARDIOLOGY CLINIC | Facility: CLINIC | Age: 63
End: 2023-05-22

## 2023-05-22 VITALS
OXYGEN SATURATION: 99 % | HEIGHT: 63 IN | HEART RATE: 67 BPM | SYSTOLIC BLOOD PRESSURE: 140 MMHG | DIASTOLIC BLOOD PRESSURE: 70 MMHG | BODY MASS INDEX: 18.96 KG/M2 | WEIGHT: 107 LBS

## 2023-05-22 DIAGNOSIS — E11.3293 TYPE 2 DIABETES MELLITUS WITH BOTH EYES AFFECTED BY MILD NONPROLIFERATIVE RETINOPATHY WITHOUT MACULAR EDEMA, WITHOUT LONG-TERM CURRENT USE OF INSULIN (HCC): ICD-10-CM

## 2023-05-22 DIAGNOSIS — E78.2 MIXED HYPERLIPIDEMIA: ICD-10-CM

## 2023-05-22 DIAGNOSIS — R00.2 PALPITATIONS: Primary | ICD-10-CM

## 2023-05-22 DIAGNOSIS — I10 BENIGN ESSENTIAL HYPERTENSION: ICD-10-CM

## 2023-05-22 RX ORDER — PRAVASTATIN SODIUM 10 MG
10 TABLET ORAL DAILY
Qty: 90 TABLET | Refills: 1 | OUTPATIENT
Start: 2023-05-22

## 2023-05-22 RX ORDER — ROSUVASTATIN CALCIUM 10 MG/1
10 TABLET, COATED ORAL
Qty: 90 TABLET | Refills: 3 | Status: SHIPPED | OUTPATIENT
Start: 2023-05-22

## 2023-05-23 NOTE — PROGRESS NOTES
[facility]  [address] [facility] [address] Bernstein, [address]  Cardiology Follow-up  Abdon Foster  [id number]  1960      Consult for:Palpitations  Appreciate consult by: Lucia Rodgers DO    1  Palpitations  POCT ECG      2  Benign essential hypertension  POCT ECG      3  Mixed hyperlipidemia  POCT ECG    rosuvastatin (CRESTOR) 10 MG tablet    Lipoprotein NMR      4  Type 2 diabetes mellitus with both eyes affected by mild nonproliferative retinopathy without macular edema, without long-term current use of insulin (HCC)  Lipoprotein NMR         Discussion/Summary:   Palpitations-no major symptoms  No afib/flutter  Normal qtc  Normal resting ekg Continue monitor  Htn- controlled  bystolic 5mg daily + micardis 40mg daily  Hld- rosuvastatin 10mg  LDL of 96 HDL 51  Target less than 70  Omega 3 2000mg to target improved HDL above 55  She will have repeat lipids in 6 months  Target less than 70  Prior hx of mediastinal radiation  Dm2- A1c-5  6  Very well controlled    Rtc- 1 year    HPI:   62 yo with hx hld, prior mediastinal radiation presents for initial visit  Her blood pressures have been well controlled  She has been compliant with a low-dose statin medication  She has no prior history of atrial fibrillation  She reports having very sporadic and seldom palpitations  She denies feeling dizziness or lightheadedness  She had a stress test in 2019 where she was able to reach target and goal   She denies having any recent fevers or chills  Her prior records were reviewed  04/22/2021:  She continues to be very active  She states her blood pressures have been well controlled at home  She periodically has few palpitations  She is currently in normal sinus rhythm  She denies having PND orthopnea  She denies having chest heaviness  Reviewed her last lab work  Her LDL is trending lower  05/19/2022:  She denies having major palpitations    Her blood pressure is usually very well controlled  No major EKG changes  We discussed about her recent lipid panel  She has repeat blood work pending  She is currently walking and doing activities without major palpitations or shortness of breath     5/23/2023: She is exercising without limitations  She denies having chest heaviness  She denies having dizziness or lightheadedness      PMH  Breast cancer- R mastectomy 2003-                           2007 lumpectomy- R sided  Last stress test 2019- negative    Social Hx  No smoking hx  Work for [employer]  No snoring    Family Hx  Father- heart disease in 76s    Past Medical History:   Diagnosis Date   • Abnormal liver function test    • Acute upper respiratory infection 04/17/2008   • Breast cancer ([facility] ) 12/01/2003   • Cancer ([facility] )    • Cough 08/07/2008   • Diabetes mellitus ([facility] )    • History of acute sinusitis 09/28/2010   • History of diarrhea    • History of hypertension    • History of motion sickness 08/07/2008   • History of orthopnea 01/10/2008     Social History     Socioeconomic History   • Marital status: /Civil Union     Spouse name: Not on file   • Number of children: Not on file   • Years of education: Not on file   • Highest education level: Not on file   Occupational History   • Not on file   Tobacco Use   • Smoking status: Never   • Smokeless tobacco: Never   Vaping Use   • Vaping Use: Never used   Substance and Sexual Activity   • Alcohol use: Not Currently   • Drug use: No   • Sexual activity: Not Currently     Partners: Male   Other Topics Concern   • Not on file   Social History Narrative   • Not on file     Social Determinants of Health     Financial Resource Strain: Not on file   Food Insecurity: Not on file   Transportation Needs: Not on file   Physical Activity: Not on file   Stress: Not on file   Social Connections: Not on file   Intimate Partner Violence: Not on file   Housing Stability: Not on file      Family History   Problem Relation Age of Onset   • Hypertension Father            • Diabetes Family    • Heart disease Family    • Parkinsonism Mother    • Dementia Mother    • No Known Problems Sister    • No Known Problems Daughter    • No Known Problems Daughter    • Mental illness Neg Hx      Past Surgical History:   Procedure Laterality Date   • BREAST LUMPECTOMY Right [place]   • MASTECTOMY Right 2003   • TOOTH EXTRACTION         Current Outpatient Medications:   •  Accu-Chek FastClix Lancets MISC, USE TO TEST DAILY, Disp: 102 each, Rfl: 3  •  Accu-Chek Guide test strip, USE TO TEST DAILY, Disp: 100 strip, Rfl: 3  •  aspirin 81 MG tablet, Take by mouth daily, Disp: , Rfl:   •  fexofenadine (ALLEGRA) 180 MG tablet, Take 360 mg by mouth daily, Disp: , Rfl:   •  Glucosamine-Chondroitin 250-200 MG TABS, Take by mouth daily , Disp: , Rfl:   •  Jublia 10 % SOLN, , Disp: , Rfl:   •  Kombiglyze XR 5-1000 MG TB24, TAKE 1 TABLET DAILY, Disp: 90 tablet, Rfl: 1  •  letrozole (FEMARA) 2 5 mg tablet, TAKE 1 TABLET DAILY, Disp: 90 tablet, Rfl: 0  •  Multiple Vitamins-Iron (QC DAILY MULTIVITAMINS/IRON) TABS, Take by mouth daily, Disp: , Rfl:   •  nebivolol (BYSTOLIC) 5 mg tablet, Take 1 tablet (5 mg total) by mouth daily, Disp: 90 tablet, Rfl: 3  •  Probiotic Product (PROBIOTIC DAILY) CAPS, Take by mouth daily , Disp: , Rfl:   •  rosuvastatin (CRESTOR) 10 MG tablet, Take 1 tablet (10 mg total) by mouth daily at bedtime, Disp: 90 tablet, Rfl: 3  •  telmisartan (MICARDIS) 40 mg tablet, TAKE 1 TABLET DAILY, Disp: 90 tablet, Rfl: 3  •  zinc gluconate 50 mg tablet, Take 50 mg by mouth daily, Disp: , Rfl:   Allergies   Allergen Reactions   • Erythromycin Swelling     Reaction Date: 2007;    • Penicillins Hives     Reaction Date: 2007;    • Codeine Nausea Only     Reaction Date: 2007;    • Sulfamethoxazole-Trimethoprim Hives   • Sulfate Rash and Fever     Reaction Date: 2007;      Vitals:    23 1556   BP: "140/70   BP Location: Right arm   Patient Position: Sitting   Cuff Size: Standard   Pulse: 67   SpO2: 99%   Weight: 48 5 kg (107 lb)   Height: 5' 3\" (1 6 m)       Review of Systems:   Review of Systems   Constitutional: Negative  Negative for activity change, appetite change, chills, diaphoresis, fatigue, fever and unexpected weight change  HENT: Negative  Negative for congestion, dental problem, drooling, ear discharge, ear pain, facial swelling, hearing loss, mouth sores, nosebleeds, postnasal drip, rhinorrhea, sinus pressure, sinus pain, sneezing, sore throat, tinnitus, trouble swallowing and voice change  Eyes: Negative  Negative for photophobia, pain, redness, itching and visual disturbance  Respiratory: Negative  Negative for apnea, cough, choking, chest tightness, shortness of breath, wheezing and stridor  Cardiovascular: Positive for palpitations  Negative for chest pain and leg swelling  Gastrointestinal: Negative  Negative for abdominal distention, abdominal pain, anal bleeding, blood in stool, constipation, diarrhea, nausea, rectal pain and vomiting  Endocrine: Negative  Negative for cold intolerance, heat intolerance, polydipsia, polyphagia and polyuria  Genitourinary: Negative  Negative for decreased urine volume, difficulty urinating, dyspareunia, dysuria, enuresis, flank pain, frequency, genital sores, hematuria, menstrual problem, pelvic pain, urgency, vaginal bleeding, vaginal discharge and vaginal pain  Musculoskeletal: Negative  Negative for arthralgias, back pain, gait problem, joint swelling, myalgias, neck pain and neck stiffness  Skin: Negative  Negative for color change, pallor, rash and wound  Allergic/Immunologic: Negative  Negative for environmental allergies, food allergies and immunocompromised state  Neurological: Negative    Negative for dizziness, tremors, seizures, syncope, facial asymmetry, speech difficulty, weakness, light-headedness, numbness and " "headaches  Hematological: Negative  Negative for adenopathy  Does not bruise/bleed easily  Psychiatric/Behavioral: Negative  Negative for agitation, behavioral problems, confusion, decreased concentration, dysphoric mood, hallucinations, self-injury, sleep disturbance and suicidal ideas  The patient is not nervous/anxious and is not hyperactive  All other systems reviewed and are negative  Vitals:    05/22/23 1556   BP: 140/70   BP Location: Right arm   Patient Position: Sitting   Cuff Size: Standard   Pulse: 67   SpO2: 99%   Weight: 48 5 kg (107 lb)   Height: 5' 3\" (1 6 m)     Physical Examination:   Physical Exam  Constitutional:       General: She is not in acute distress  Appearance: She is well-developed  She is not diaphoretic  HENT:      Head: Normocephalic and atraumatic  Right Ear: External ear normal       Left Ear: External ear normal    Eyes:      General: No scleral icterus  Right eye: No discharge  Left eye: No discharge  Conjunctiva/sclera: Conjunctivae normal       Pupils: Pupils are equal, round, and reactive to light  Neck:      Thyroid: No thyromegaly  Vascular: No JVD  Trachea: No tracheal deviation  Cardiovascular:      Rate and Rhythm: Normal rate and regular rhythm  Heart sounds: No murmur heard  No friction rub  No gallop  Pulmonary:      Effort: Pulmonary effort is normal  No respiratory distress  Breath sounds: Normal breath sounds  No stridor  No wheezing or rales  Chest:      Chest wall: No tenderness  Abdominal:      General: Bowel sounds are normal  There is no distension  Palpations: Abdomen is soft  There is no mass  Tenderness: There is no abdominal tenderness  There is no guarding or rebound  Musculoskeletal:         General: No tenderness or deformity  Normal range of motion  Cervical back: Normal range of motion and neck supple  Skin:     General: Skin is warm and dry        Coloration: " Skin is not pale  Findings: No erythema or rash  Neurological:      Mental Status: She is alert and oriented to person, place, and time  Cranial Nerves: No cranial nerve deficit  Motor: No abnormal muscle tone  Coordination: Coordination normal       Deep Tendon Reflexes: Reflexes are normal and symmetric  Reflexes normal    Psychiatric:         Behavior: Behavior normal          Thought Content: Thought content normal          Judgment: Judgment normal          Labs:     Lab Results   Component Value Date    WBC 6 4 12/10/2022    HGB 11 9 12/10/2022    HCT 36 2 12/10/2022    MCV 81 3 12/10/2022    RDW 14 4 12/10/2022     12/10/2022     BMP:  Lab Results   Component Value Date    SODIUM 134 (L) 12/10/2022    K 4 8 12/10/2022    CL 99 12/10/2022    CO2 29 12/10/2022    BUN 8 12/10/2022    CREATININE 0 86 12/10/2022    GLUC 121 (H) 12/10/2022    GLUF 129 05/21/2016    CALCIUM 9 4 12/10/2022    EGFR 77 12/10/2022     LFT:  Lab Results   Component Value Date    AST 18 12/10/2022    ALT 10 12/10/2022    ALKPHOS 68 12/10/2022    TP 6 7 12/10/2022    ALB 3 9 12/10/2022      Lab Results   Component Value Date    RQU9EHFXMMLU 1 15 02/20/2016     Lab Results   Component Value Date    HGBA1C 5 6 12/10/2022     Lipid Profile:   Lab Results   Component Value Date    CHOLESTEROL 173 12/10/2022    HDL 57 12/10/2022    LDLCALC 97 12/10/2022    TRIG 91 12/10/2022     Lab Results   Component Value Date    CHOLESTEROL 173 12/10/2022    CHOLESTEROL 172 06/18/2022       Imaging & Testing   I have personally reviewed pertinent reports        Cardiac Testing     Results for orders placed during the hospital encounter of 01/26/19   Echo complete with contrast if indicated    Narrative [facility] [address]  [address] [address]  [phone]    Transthoracic Echocardiogram  2D, M-mode, Doppler, and Color Doppler    Study date:  26-Jan-2019    Patient: FARIDA LANDRUM  MR number: [id number]  Account number: [de-identified]  : 1960  Age: 63 years  Gender: Female  Status: Outpatient  Location: Echo lab  Height: 62 in  Weight: 119 7 lb  BP: 166/ 76 mmHg    Indications: Palpitations    Diagnoses: R00 2 - Palpitations    Sonographer:  Dahiana Reyes RDCS  Primary Physician:  Anayeli Sharma DO  Referring Physician:  Lydia Dunlap MD  Group:  [facility]  Ordering Physician:  Lydia Dunlap MD  Interpreting Physician:  Darren Cuello MD    SUMMARY    LEFT VENTRICLE:  Systolic function was normal by EF (single plane method of disks)  Ejection fraction was estimated in the range of 60 % to 65 % to be 63 %  There were no regional wall motion abnormalities  LEFT ATRIUM:  Size was normal     RIGHT ATRIUM:  Size was normal     HISTORY: PRIOR HISTORY: Hypertension, Breast cancer, Diabetes Mellitus, Hyperlipidemia    PROCEDURE: The procedure was performed in the echo lab  This was a routine study  The transthoracic approach was used  The study included complete 2D imaging, M-mode, complete spectral Doppler, and color Doppler  The heart rate was 96 bpm,  at the start of the study  Image quality was adequate  LEFT VENTRICLE: Size was normal  Systolic function was normal by EF (single plane method of disks)  Ejection fraction was estimated in the range of 60 % to 65 % to be 63 %  There were no regional wall motion abnormalities  Wall thickness  was normal  No evidence of apical thrombus  DOPPLER: Left ventricular diastolic function parameters were normal for the patient's age  RIGHT VENTRICLE: The size was normal  Systolic function was normal  Wall thickness was normal     LEFT ATRIUM: Size was normal     RIGHT ATRIUM: Size was normal     MITRAL VALVE: Valve structure was normal  There was normal leaflet separation  DOPPLER: The transmitral velocity was within the normal range  There was no evidence for stenosis   There was no significant regurgitation  AORTIC VALVE: The valve was trileaflet  Leaflets exhibited mildly increased thickness and normal cuspal separation  DOPPLER: Transaortic velocity was within the normal range  There was no evidence for stenosis  There was no significant  regurgitation  TRICUSPID VALVE: The valve structure was normal  There was normal leaflet separation  DOPPLER: The transtricuspid velocity was within the normal range  There was no evidence for stenosis  There was no significant regurgitation  PULMONIC VALVE: Leaflets exhibited normal thickness, no calcification, and normal cuspal separation  DOPPLER: The transpulmonic velocity was within the normal range  There was trace regurgitation  PERICARDIUM: There was no pericardial effusion  The pericardium was normal in appearance  AORTA: The root exhibited normal size  SYSTEMIC VEINS: IVC: The inferior vena cava was not well visualized  SYSTEM MEASUREMENT TABLES    2D mode  AoR Diam 2D: 2 8 cm  LA Diam (2D): 3 cm  LA/Ao (2D): 1 07  FS (2D Teich): 35 6 %  IVSd (2D): 0 74 cm  LVDEV: 69 6 cm³  LVESV: 23 9 cm³  LVIDd(2D): 3 99 cm  LVISd (2D): 2 57 cm  LVOT Area 2D: 2 54 cm squared  LVPWd (2D): 0 76 cm  SV (Teich): 45 7 cm³    Apical four chamber  LVEF A4C: 63 %    Unspecified Scan Mode  NESS Cont Eq (Peak Miguel): 2 42 cm squared  LVOT Diam : 1 8 cm  LVOT Vmax: 1050 mm/s  LVOT Vmax; Mean: 1050 mm/s  Peak Grad ; Mean: 4 mm[Hg]  MV Peak A Miguel: 982 mm/s  MV Peak E Miguel  Mean: 899 mm/s  MVA (PHT): 4 cm squared  PHT: 55 ms  Max P mm[Hg]  V Max: 2060 mm/s  Vmax: 2060 mm/s  RA Area: 12 cm squared  RA Volume: 27 1 cm³  TAPSE: 2 4 cm    [employer] Accredited Echocardiography Laboratory    Prepared and electronically signed by    Di Kc MD  Signed 2019 10:42:27     Last stress test 2 years ago doing 10-12 minutes without having any significant symptoms  EKG: Personally reviewed      Normal sinus rhythm 74bpm no acute st/t wave "changes      Sangeetha Worthy MD [facility]  [phone]  Please call with any questions or suggestions    Counseling :  A description of the counseling:   Goals and Barriers:  Patient's ability to self care:  Medication side effect reviewed with patient in detail and all their questions answered  \"Portions of the record may have been created with voice recognition software  Occasional wrong word or \"sound a like\" substitutions may have occurred due to the inherent limitations of voice recognition software  Read the chart carefully and recognize, using context, where substitutions have occurred  Please call if you have any questions   \"    "

## 2023-05-25 DIAGNOSIS — Z17.0 MALIGNANT NEOPLASM OF BREAST IN FEMALE, ESTROGEN RECEPTOR POSITIVE, UNSPECIFIED LATERALITY, UNSPECIFIED SITE OF BREAST (HCC): ICD-10-CM

## 2023-05-25 DIAGNOSIS — C50.919 MALIGNANT NEOPLASM OF BREAST IN FEMALE, ESTROGEN RECEPTOR POSITIVE, UNSPECIFIED LATERALITY, UNSPECIFIED SITE OF BREAST (HCC): ICD-10-CM

## 2023-05-25 RX ORDER — LETROZOLE 2.5 MG/1
TABLET, FILM COATED ORAL
Qty: 30 TABLET | Refills: 0 | Status: SHIPPED | OUTPATIENT
Start: 2023-05-25

## 2023-06-08 ENCOUNTER — TELEPHONE (OUTPATIENT)
Dept: HEMATOLOGY ONCOLOGY | Facility: MEDICAL CENTER | Age: 63
End: 2023-06-08

## 2023-06-08 NOTE — TELEPHONE ENCOUNTER
I spoke with the patient in regards to her lab work that needs to be completed prior to her appt on 6/19/23 at 10:40am  Patient states she will be going this weekend

## 2023-06-11 LAB
ALBUMIN SERPL-MCNC: 4.3 G/DL (ref 3.6–5.1)
ALBUMIN/GLOB SERPL: 1.5 (CALC) (ref 1–2.5)
ALP SERPL-CCNC: 72 U/L (ref 37–153)
ALT SERPL-CCNC: 19 U/L (ref 6–29)
AST SERPL-CCNC: 23 U/L (ref 10–35)
BASOPHILS # BLD AUTO: 7 CELLS/UL (ref 0–200)
BASOPHILS NFR BLD AUTO: 0.1 %
BILIRUB SERPL-MCNC: 0.4 MG/DL (ref 0.2–1.2)
BUN SERPL-MCNC: 13 MG/DL (ref 7–25)
BUN/CREAT SERPL: ABNORMAL (CALC) (ref 6–22)
CALCIUM SERPL-MCNC: 9.4 MG/DL (ref 8.6–10.4)
CANCER AG125 SERPL-ACNC: 5 U/ML
CANCER AG15-3 SERPL-ACNC: 17 U/ML
CANCER AG27-29 SERPL-ACNC: 27 U/ML
CHLORIDE SERPL-SCNC: 100 MMOL/L (ref 98–110)
CHOLEST SERPL-MCNC: 135 MG/DL
CHOLEST/HDLC SERPL: 1.9 (CALC)
CO2 SERPL-SCNC: 28 MMOL/L (ref 20–32)
CREAT SERPL-MCNC: 0.89 MG/DL (ref 0.5–1.05)
EOSINOPHIL # BLD AUTO: 0 CELLS/UL (ref 15–500)
EOSINOPHIL NFR BLD AUTO: 0 %
ERYTHROCYTE [DISTWIDTH] IN BLOOD BY AUTOMATED COUNT: 14 % (ref 11–15)
GFR/BSA.PRED SERPLBLD CYS-BASED-ARV: 74 ML/MIN/1.73M2
GLOBULIN SER CALC-MCNC: 2.8 G/DL (CALC) (ref 1.9–3.7)
GLUCOSE SERPL-MCNC: 136 MG/DL (ref 65–99)
HBA1C MFR BLD: 5.8 % OF TOTAL HGB
HCT VFR BLD AUTO: 36.9 % (ref 35–45)
HDLC SERPL-MCNC: 71 MG/DL
HGB BLD-MCNC: 12.3 G/DL (ref 11.7–15.5)
LDLC SERPL CALC-MCNC: 50 MG/DL (CALC)
LYMPHOCYTES # BLD AUTO: 1281 CELLS/UL (ref 850–3900)
LYMPHOCYTES NFR BLD AUTO: 18.3 %
MCH RBC QN AUTO: 27.3 PG (ref 27–33)
MCHC RBC AUTO-ENTMCNC: 33.3 G/DL (ref 32–36)
MCV RBC AUTO: 82 FL (ref 80–100)
MONOCYTES # BLD AUTO: 441 CELLS/UL (ref 200–950)
MONOCYTES NFR BLD AUTO: 6.3 %
NEUTROPHILS # BLD AUTO: 5271 CELLS/UL (ref 1500–7800)
NEUTROPHILS NFR BLD AUTO: 75.3 %
NONHDLC SERPL-MCNC: 64 MG/DL (CALC)
PLATELET # BLD AUTO: 196 THOUSAND/UL (ref 140–400)
PMV BLD REES-ECKER: 8.9 FL (ref 7.5–12.5)
POTASSIUM SERPL-SCNC: 4.2 MMOL/L (ref 3.5–5.3)
PROT SERPL-MCNC: 7.1 G/DL (ref 6.1–8.1)
RBC # BLD AUTO: 4.5 MILLION/UL (ref 3.8–5.1)
SODIUM SERPL-SCNC: 136 MMOL/L (ref 135–146)
TRIGL SERPL-MCNC: 55 MG/DL
TSH SERPL-ACNC: 0.64 MIU/L (ref 0.4–4.5)
WBC # BLD AUTO: 7 THOUSAND/UL (ref 3.8–10.8)

## 2023-06-14 ENCOUNTER — RA CDI HCC (OUTPATIENT)
Dept: OTHER | Facility: HOSPITAL | Age: 63
End: 2023-06-14

## 2023-06-14 NOTE — PROGRESS NOTES
[facility]  coding opportunities       Chart reviewed, no opportunity found: CHART REVIEWED, NO OPPORTUNITY FOUND        Patients Insurance        Commercial Insurance: [employer]

## 2023-06-18 DIAGNOSIS — I10 BENIGN ESSENTIAL HYPERTENSION: ICD-10-CM

## 2023-06-18 DIAGNOSIS — C50.919 MALIGNANT NEOPLASM OF BREAST IN FEMALE, ESTROGEN RECEPTOR POSITIVE, UNSPECIFIED LATERALITY, UNSPECIFIED SITE OF BREAST (HCC): ICD-10-CM

## 2023-06-18 DIAGNOSIS — Z17.0 MALIGNANT NEOPLASM OF BREAST IN FEMALE, ESTROGEN RECEPTOR POSITIVE, UNSPECIFIED LATERALITY, UNSPECIFIED SITE OF BREAST (HCC): ICD-10-CM

## 2023-06-19 ENCOUNTER — OFFICE VISIT (OUTPATIENT)
Dept: FAMILY MEDICINE CLINIC | Facility: CLINIC | Age: 63
End: 2023-06-19
Payer: COMMERCIAL

## 2023-06-19 ENCOUNTER — OFFICE VISIT (OUTPATIENT)
Dept: HEMATOLOGY ONCOLOGY | Facility: MEDICAL CENTER | Age: 63
End: 2023-06-19
Payer: COMMERCIAL

## 2023-06-19 VITALS
RESPIRATION RATE: 18 BRPM | WEIGHT: 112 LBS | DIASTOLIC BLOOD PRESSURE: 70 MMHG | HEIGHT: 63 IN | TEMPERATURE: 97.7 F | HEART RATE: 73 BPM | SYSTOLIC BLOOD PRESSURE: 130 MMHG | OXYGEN SATURATION: 100 % | BODY MASS INDEX: 19.84 KG/M2

## 2023-06-19 VITALS
WEIGHT: 112 LBS | BODY MASS INDEX: 19.84 KG/M2 | OXYGEN SATURATION: 100 % | DIASTOLIC BLOOD PRESSURE: 88 MMHG | HEIGHT: 63 IN | RESPIRATION RATE: 18 BRPM | HEART RATE: 71 BPM | SYSTOLIC BLOOD PRESSURE: 148 MMHG | TEMPERATURE: 98.8 F

## 2023-06-19 DIAGNOSIS — E11.3293 TYPE 2 DIABETES MELLITUS WITH BOTH EYES AFFECTED BY MILD NONPROLIFERATIVE RETINOPATHY WITHOUT MACULAR EDEMA, WITHOUT LONG-TERM CURRENT USE OF INSULIN (HCC): Primary | ICD-10-CM

## 2023-06-19 DIAGNOSIS — M81.0 OSTEOPOROSIS WITHOUT CURRENT PATHOLOGICAL FRACTURE, UNSPECIFIED OSTEOPOROSIS TYPE: ICD-10-CM

## 2023-06-19 DIAGNOSIS — C50.911 ADENOCARCINOMA OF RIGHT BREAST (HCC): Primary | ICD-10-CM

## 2023-06-19 DIAGNOSIS — E78.2 MIXED HYPERLIPIDEMIA: ICD-10-CM

## 2023-06-19 DIAGNOSIS — I10 BENIGN ESSENTIAL HYPERTENSION: ICD-10-CM

## 2023-06-19 DIAGNOSIS — C50.911 ADENOCARCINOMA OF RIGHT BREAST (HCC): ICD-10-CM

## 2023-06-19 PROCEDURE — 99214 OFFICE O/P EST MOD 30 MIN: CPT | Performed by: FAMILY MEDICINE

## 2023-06-19 PROCEDURE — 99214 OFFICE O/P EST MOD 30 MIN: CPT | Performed by: INTERNAL MEDICINE

## 2023-06-19 RX ORDER — NEBIVOLOL 5 MG/1
TABLET ORAL
Qty: 90 TABLET | Refills: 3 | Status: SHIPPED | OUTPATIENT
Start: 2023-06-19

## 2023-06-19 RX ORDER — NEBIVOLOL 5 MG/1
5 TABLET ORAL DAILY
Qty: 90 TABLET | Refills: 3 | Status: SHIPPED | OUTPATIENT
Start: 2023-06-19

## 2023-06-19 RX ORDER — ROSUVASTATIN CALCIUM 10 MG/1
10 TABLET, COATED ORAL
Qty: 90 TABLET | Refills: 3 | Status: SHIPPED | OUTPATIENT
Start: 2023-06-19

## 2023-06-19 RX ORDER — LETROZOLE 2.5 MG/1
TABLET, FILM COATED ORAL
Qty: 30 TABLET | Refills: 3 | Status: SHIPPED | OUTPATIENT
Start: 2023-06-19 | End: 2023-06-21 | Stop reason: SDUPTHER

## 2023-06-19 RX ORDER — SAXAGLIPTIN AND METFORMIN HYDROCHLORIDE 5; 1000 MG/1; MG/1
1 TABLET, FILM COATED, EXTENDED RELEASE ORAL DAILY
Qty: 90 TABLET | Refills: 3 | Status: SHIPPED | OUTPATIENT
Start: 2023-06-19

## 2023-06-19 RX ORDER — TELMISARTAN 40 MG/1
40 TABLET ORAL DAILY
Qty: 90 TABLET | Refills: 3 | Status: SHIPPED | OUTPATIENT
Start: 2023-06-19

## 2023-06-19 NOTE — PROGRESS NOTES
Saida Nelson  1960  [address] [facility] [address] [facility] [id number]    DISCUSSIONS/SUMMARY:    20-year-old female with history of recurrent right breast cancer presently on Femara  Mrs Nelson feels well and clinically there are no troubling signs or any signs of breast cancer recurrence  Recent laboratory tests were good/acceptable  The recent mammogram was also WNL  Patient is to continue with the self breast exams and yearly mammograms (due in November 2023)  Patient is to return in 6 months with repeat blood work before (patient's request)  Repeat DEXA scan has been was recently ordered by the PCP  Patient continues to be active, working out etc; is also on calcium and vitamin D  Routine health maintenance and medical care is up-to-date  Patient knows to call if she has any other oncology questions or concerns  Carefully review your medication list and verify that the list is accurate and up-to-date  Please call the oncology office if there are medications missing from the list, medications on the list that you are not currently taking or if there is a dosage or instruction that is different from how you're taking a medication  __________________________________________________________________________________________________    Chief Complaint   Patient presents with   • Follow-up     Malignant neoplasm of breast in female, estrogen receptor positive, unspecified laterality, unspecified site of breast      History of Present Illness:    65 female with a complicated past breast cancer history back for follow-up  Patient was first diagnosed in 2003 and underwent a right modified radical mastectomy in December 2003  Pathology results from [facility] in [address] [place] [address] demonstrated invasive ductal carcinoma, 0 5 cm with focal lymphovascular invasion   There was a second right breast mass also invasive, 4 0 by 2 5 cm, moderately differentiated  Patient also had focal DCIS, margins were free of tumor  Right axillary contents demonstrated 4/17 positive lymph nodes  Final stage was IIIA (pT2 pN2a M0)  Patient states that she refused adjuvant chemotherapy at that time  It is never been clear why patient was not offered adjuvant hormonal manipulation  Subsequently patient moved to [place] and in 2007 began to have pain in her right shoulder  Workup demonstrated 2 nodules within the shoulder muscle  One nodule was removed and demonstrated metastatic carcinoma  Patient underwent radiotherapy without complications and was placed on tamoxifen - completed 5 years (started in January 2008)  Patient subsequently began Femara and has completed approximately 10 years of Femara  Patient returns for follow-up  Mrs Nelson states feeling well  No breast related issues  No pain control issues  No problems with the Femara  Routine health maintenance and medical care is up-to-date  Review of Systems   Constitutional: Negative  HENT: Negative  Eyes: Negative  Respiratory: Negative  Cardiovascular: Negative  Gastrointestinal: Negative  Endocrine: Negative  Genitourinary: Negative  Musculoskeletal: Negative  Skin: Negative  Allergic/Immunologic: Negative  Neurological: Negative  Hematological: Negative  Psychiatric/Behavioral: Negative  All other systems reviewed and are negative       Patient Active Problem List   Diagnosis   • Adenocarcinoma of breast ([address] )   • Seasonal allergic rhinitis due to pollen   • Benign essential hypertension   • Breast cancer ([facility] [address] )   • Mixed hyperlipidemia   • Osteoporosis   • Type 2 diabetes mellitus with both eyes affected by mild nonproliferative retinopathy without macular edema, without long-term current use of insulin (HCC)   • Idiopathic urticaria     Past Medical History:   Diagnosis Date   • Abnormal liver function test    • Acute upper respiratory infection 2008   • Breast cancer ([facility] ) 2003   • Cancer ([facility] )    • Cough 2008   • Diabetes mellitus ([facility] )    • History of acute sinusitis 2010   • History of diarrhea    • History of hypertension    • History of motion sickness 2008   • History of orthopnea 01/10/2008     Past Surgical History:   Procedure Laterality Date   • BREAST LUMPECTOMY Right 2007    [place] area   • MASTECTOMY Right 2003   • TOOTH EXTRACTION       Family History   Problem Relation Age of Onset   • Hypertension Father            • Diabetes Family    • Heart disease Family    • Parkinsonism Mother    • Dementia Mother    • No Known Problems Sister    • No Known Problems Daughter    • No Known Problems Daughter    • Mental illness Neg Hx      Social History     Socioeconomic History   • Marital status: /Civil Union     Spouse name: Not on file   • Number of children: Not on file   • Years of education: Not on file   • Highest education level: Not on file   Occupational History   • Not on file   Tobacco Use   • Smoking status: Never   • Smokeless tobacco: Never   Vaping Use   • Vaping Use: Never used   Substance and Sexual Activity   • Alcohol use: Not Currently   • Drug use: No   • Sexual activity: Not Currently     Partners: Male   Other Topics Concern   • Not on file   Social History Narrative   • Not on file     Social Determinants of Health     Financial Resource Strain: Not on file   Food Insecurity: Not on file   Transportation Needs: Not on file   Physical Activity: Not on file   Stress: Not on file   Social Connections: Not on file   Intimate Partner Violence: Not on file   Housing Stability: Not on file       Current Outpatient Medications:   •  Accu-Chek FastClix Lancets MISC, USE TO TEST DAILY, Disp: 102 each, Rfl: 3  •  Accu-Chek Guide test strip, USE TO TEST DAILY, Disp: 100 strip, Rfl: 3  •  aspirin 81 MG tablet, Take by mouth daily, Disp: , Rfl:   •  fexofenadine (ALLEGRA) 180 MG tablet, Take 360 mg by mouth daily, Disp: , Rfl:   •  Glucosamine-Chondroitin 250-200 MG TABS, Take by mouth daily , Disp: , Rfl:   •  Jublia 10 % SOLN, , Disp: , Rfl:   •  letrozole (FEMARA) 2 5 mg tablet, TAKE 1 TABLET DAILY, Disp: 30 tablet, Rfl: 3  •  Multiple Vitamins-Iron (QC DAILY MULTIVITAMINS/IRON) TABS, Take by mouth daily, Disp: , Rfl:   •  nebivolol (BYSTOLIC) 5 mg tablet, Take 1 tablet (5 mg total) by mouth daily, Disp: 90 tablet, Rfl: 3  •  Probiotic Product (PROBIOTIC DAILY) CAPS, Take by mouth daily , Disp: , Rfl:   •  rosuvastatin (CRESTOR) 10 MG tablet, Take 1 tablet (10 mg total) by mouth daily at bedtime, Disp: 90 tablet, Rfl: 3  •  sAXagliptin-metFORMIN ER (Kombiglyze XR) 5-1000 MG TB24, Take 1 tablet by mouth daily, Disp: 90 tablet, Rfl: 3  •  telmisartan (MICARDIS) 40 mg tablet, Take 1 tablet (40 mg total) by mouth daily, Disp: 90 tablet, Rfl: 3  •  zinc gluconate 50 mg tablet, Take 50 mg by mouth daily, Disp: , Rfl:      Allergies   Allergen Reactions   • Erythromycin Swelling     Reaction Date: 23Aug2007;    • Penicillins Hives     Reaction Date: 23Aug2007;    • Codeine Nausea Only     Reaction Date: 23Aug2007;    • Sulfamethoxazole-Trimethoprim Hives   • Sulfate Rash and Fever     Reaction Date: 23Aug2007;      Vitals:    06/19/23 1038   BP: 148/88   Pulse: 71   Resp: 18   Temp: 98 8 °F (37 1 °C)   SpO2: 100%     Physical Exam  Constitutional:       Appearance: She is well-developed  Comments: Well-nourished female, no respiratory distress   HENT:      Head: Normocephalic and atraumatic  Right Ear: External ear normal       Left Ear: External ear normal       Nose: Nose normal    Eyes:      Conjunctiva/sclera: Conjunctivae normal       Pupils: Pupils are equal, round, and reactive to light  Neck:      Comments: Supple, no JVD  Cardiovascular:      Rate and Rhythm: Normal rate and regular rhythm  Heart sounds: Normal heart sounds     Pulmonary:      Effort: Pulmonary effort is normal       Breath sounds: Normal breath sounds  Abdominal:      General: Bowel sounds are normal       Palpations: Abdomen is soft  Comments: Soft, nontender, +bowel sounds, no hepatosplenomegaly, no rigidity or rebound   Musculoskeletal:         General: Normal range of motion  Cervical back: Normal range of motion and neck supple  Skin:     General: Skin is warm  Comments: Warm, moist, good color, no petechiae or ecchymoses   Neurological:      Mental Status: She is alert and oriented to person, place, and time  Deep Tendon Reflexes: Reflexes are normal and symmetric  Psychiatric:         Behavior: Behavior normal          Thought Content: Thought content normal          Judgment: Judgment normal      Breasts:  Deferred, no axillary adenopathy bilaterally  Extremities:  No lower extremity edema bilaterally, no cords, pulses are 1+  Lymphatics:   No adenopathy in the neck, supraclavicular region, axilla and groin bilaterally    Performance Status: ECOG = 0    Labs    6/10/2023 WBC = 7 0 hemoglobin = 12 3 hematocrit = 37 platelet = 178 neutrophil = 75% BUN = 13 creatinine = 0 89 calcium = 9 4 LFTs WNL CA 27, 29 = 27 CA 15-3 = 17  = 5    Imaging    11/25/2022 mammogram screening left with 3D and CAD  Impression stated no mammographic evidence of malignancy, category 1, routine screening 1 year  11/19/2021 mammogram screening left with 3D and CAD  Impression stated no mammographic evidence of malignancy, left category 2, routine mammogram 1 year  11/13/2020 screening mammogram left with 3D and CAD  Impression stated no mammographic evidence of malignancy, category 2, routine screening of left breast in 1 year  12/20/2019 DEXA scan impression stated that the lumbar measurements till equals osteopenia, fracture risk is elevated, follow-up in 1 or 2 years should be considered      11/08/2019 diagnostic left mammogram impression stated no exams for malignancy  10/29/2018 diagnostic mammogram impression stated no mammographic evidence of malignancy of left breast, category 2 (right-sided mastectomy)  10/13/17 diagnostic left mammogram = category 1, negative  10/10/16 left unilateral digital mammogram with CAD was read as category 1, negative  3/3/17 DEXA scan demonstrated osteopenia  8/7/15 left unilateral digital mammogram was category 1, negative  3/12/15 DEXA scan demonstrated WHO classification osteopenia  8/1/14 left digital diagnostic mammogram was category 1, negative  Pathology    6/25/15 BRCA1/2 sequencing and deletion/duplication analysis = negative

## 2023-06-19 NOTE — PROGRESS NOTES
Assessment/Plan:    1  Type 2 diabetes mellitus with both eyes affected by mild nonproliferative retinopathy without macular edema, without long-term current use of insulin (HCC)  -     sAXagliptin-metFORMIN ER (Kombiglyze XR) 5-1000 MG TB24; Take 1 tablet by mouth daily  -     Albumin / creatinine urine ratio; Future; Expected date: 12/16/2023  -     Comprehensive metabolic panel; Future; Expected date: 12/16/2023  -     Hemoglobin A1C; Future; Expected date: 12/16/2023  -     Lipid Panel with Direct LDL reflex; Future; Expected date: 12/16/2023  -     CBC and differential; Future; Expected date: 12/16/2023    2  Benign essential hypertension  Assessment & Plan:  stable    Orders:  -     nebivolol (BYSTOLIC) 5 mg tablet; Take 1 tablet (5 mg total) by mouth daily  -     telmisartan (MICARDIS) 40 mg tablet; Take 1 tablet (40 mg total) by mouth daily  -     Albumin / creatinine urine ratio; Future; Expected date: 12/16/2023  -     Comprehensive metabolic panel; Future; Expected date: 12/16/2023  -     Hemoglobin A1C; Future; Expected date: 12/16/2023  -     Lipid Panel with Direct LDL reflex; Future; Expected date: 12/16/2023  -     CBC and differential; Future; Expected date: 12/16/2023    3  Adenocarcinoma of right breast ([address] )    4  Osteoporosis without current pathological fracture, unspecified osteoporosis type  -     DXA bone density spine hip and pelvis; Future; Expected date: 06/19/2023    5  Mixed hyperlipidemia  -     rosuvastatin (CRESTOR) 10 MG tablet; Take 1 tablet (10 mg total) by mouth daily at bedtime  -     Albumin / creatinine urine ratio; Future; Expected date: 12/16/2023  -     Comprehensive metabolic panel; Future; Expected date: 12/16/2023  -     Hemoglobin A1C; Future; Expected date: 12/16/2023  -     Lipid Panel with Direct LDL reflex;  Future; Expected date: 12/16/2023  -     CBC and differential; Future; Expected date: 12/16/2023            There are no Patient Instructions on file for this visit  Return in about 6 months (around 12/19/2023) for Annual physical     Subjective:      Patient ID: Shane Eaton is a 65 y o  female  Chief Complaint   Patient presents with   • 6 month follow up     Here for follow up lw merline       Pt is here for a 6 mionth follow up  Pt had labs      The following portions of the patient's history were reviewed and updated as appropriate: allergies, current medications, past family history, past medical history, past social history, past surgical history and problem list     Review of Systems   Constitutional: Negative  Negative for activity change, appetite change, chills, diaphoresis and fatigue  HENT: Negative  Negative for dental problem, ear pain, sinus pressure and sore throat  Eyes: Negative  Negative for photophobia, pain, discharge, redness, itching and visual disturbance  Respiratory: Negative for apnea and chest tightness  Cardiovascular: Negative  Negative for chest pain, palpitations and leg swelling  Gastrointestinal: Negative  Negative for abdominal distention, abdominal pain, constipation and diarrhea  Endocrine: Negative  Negative for cold intolerance and heat intolerance  Genitourinary: Negative  Negative for difficulty urinating and dyspareunia  Musculoskeletal: Negative  Negative for arthralgias and back pain  Skin: Negative  Allergic/Immunologic: Negative for environmental allergies  Neurological: Negative  Negative for dizziness  Psychiatric/Behavioral: Negative  Negative for agitation           Current Outpatient Medications   Medication Sig Dispense Refill   • Accu-Chek FastClix Lancets MISC USE TO TEST DAILY 102 each 3   • Accu-Chek Guide test strip USE TO TEST DAILY 100 strip 3   • aspirin 81 MG tablet Take by mouth daily     • fexofenadine (ALLEGRA) 180 MG tablet Take 360 mg by mouth daily     • Glucosamine-Chondroitin 250-200 MG TABS Take by mouth daily      • Jublia 10 % SOLN      • letrozole (FEMARA) 2 5 mg "tablet TAKE 1 TABLET DAILY 30 tablet 3   • Multiple Vitamins-Iron (QC DAILY MULTIVITAMINS/IRON) TABS Take by mouth daily     • nebivolol (BYSTOLIC) 5 mg tablet Take 1 tablet (5 mg total) by mouth daily 90 tablet 3   • Probiotic Product (PROBIOTIC DAILY) CAPS Take by mouth daily      • rosuvastatin (CRESTOR) 10 MG tablet Take 1 tablet (10 mg total) by mouth daily at bedtime 90 tablet 3   • sAXagliptin-metFORMIN ER (Kombiglyze XR) 5-1000 MG TB24 Take 1 tablet by mouth daily 90 tablet 3   • telmisartan (MICARDIS) 40 mg tablet Take 1 tablet (40 mg total) by mouth daily 90 tablet 3   • zinc gluconate 50 mg tablet Take 50 mg by mouth daily       No current facility-administered medications for this visit  Objective:    /70   Pulse 73   Temp 97 7 °F (36 5 °C) (Tympanic)   Resp 18   Ht 5' 3\" (1 6 m)   Wt 50 8 kg (112 lb)   SpO2 100%   BMI 19 84 kg/m²        Physical Exam  Vitals and nursing note reviewed  Constitutional:       General: She is not in acute distress  Appearance: She is well-developed  She is not diaphoretic  HENT:      Head: Normocephalic and atraumatic  Right Ear: External ear normal       Left Ear: External ear normal       Nose: Nose normal       Mouth/Throat:      Pharynx: No oropharyngeal exudate  Eyes:      General: No scleral icterus  Right eye: No discharge  Left eye: No discharge  Pupils: Pupils are equal, round, and reactive to light  Neck:      Thyroid: No thyromegaly  Cardiovascular:      Rate and Rhythm: Normal rate  Pulses: no weak pulses          Dorsalis pedis pulses are 2+ on the right side and 2+ on the left side  Posterior tibial pulses are 2+ on the right side and 2+ on the left side  Heart sounds: Normal heart sounds  No murmur heard  Pulmonary:      Effort: Pulmonary effort is normal  No respiratory distress  Breath sounds: Normal breath sounds  No wheezing     Abdominal:      General: Bowel sounds are " normal  There is no distension  Palpations: Abdomen is soft  There is no mass  Tenderness: There is no abdominal tenderness  There is no guarding or rebound  Musculoskeletal:         General: Normal range of motion  Feet:      Right foot:      Skin integrity: No ulcer, skin breakdown, erythema, warmth, callus or dry skin  Left foot:      Skin integrity: No ulcer, skin breakdown, erythema, warmth, callus or dry skin  Skin:     General: Skin is warm and dry  Findings: No erythema or rash  Neurological:      Mental Status: She is alert  Coordination: Coordination normal       Deep Tendon Reflexes: Reflexes normal    Psychiatric:         Behavior: Behavior normal             Diabetic Foot Exam    Patient's shoes and socks removed  Right Foot/Ankle   Right Foot Inspection  Skin Exam: skin normal  Skin not intact, no dry skin, no warmth, no callus, no erythema, no maceration, no abnormal color, no pre-ulcer, no ulcer and no callus  Toe Exam: ROM and strength within normal limits  Sensory   Vibration: intact  Proprioception: intact  Monofilament testing: intact    Vascular  Capillary refills: < 3 seconds  The right DP pulse is 2+  The right PT pulse is 2+  Left Foot/Ankle  Left Foot Inspection  Skin Exam: skin normal  Skin not intact, no dry skin, no warmth, no erythema, no maceration, normal color, no pre-ulcer, no ulcer and no callus  Toe Exam: ROM and strength within normal limits  Sensory   Vibration: intact  Proprioception: intact  Monofilament testing: intact    Vascular  Capillary refills: < 3 seconds  The left DP pulse is 2+  The left PT pulse is 2+       Assign Risk Category  No deformity present  No loss of protective sensation  No weak pulses  Risk: 0    Recent Results (from the past 672 hour(s))   Comprehensive metabolic panel    Collection Time: 06/10/23  8:35 AM   Result Value Ref Range    Glucose, Random 136 (H) 65 - 99 mg/dL    BUN 13 7 - 25 mg/dL Creatinine 0 89 0 50 - 1 05 mg/dL    eGFR 74 > OR = 60 mL/min/1 73m2    SL AMB BUN/CREATININE RATIO NOT APPLICABLE 6 - 22 (calc)    Sodium 136 135 - 146 mmol/L    Potassium 4 2 3 5 - 5 3 mmol/L    Chloride 100 98 - 110 mmol/L    CO2 28 20 - 32 mmol/L    Calcium 9 4 8 6 - 10 4 mg/dL    Protein, Total 7 1 6 1 - 8 1 g/dL    Albumin 4 3 3 6 - 5 1 g/dL    Globulin 2 8 1 9 - 3 7 g/dL (calc)    Albumin/Globulin Ratio 1 5 1 0 - 2 5 (calc)    TOTAL BILIRUBIN 0 4 0 2 - 1 2 mg/dL    Alkaline Phosphatase 72 37 - 153 U/L    AST 23 10 - 35 U/L    ALT 19 6 - 29 U/L   CBC and differential    Collection Time: 06/10/23  8:35 AM   Result Value Ref Range    White Blood Cell Count 7 0 3 8 - 10 8 Thousand/uL    Red Blood Cell Count 4 50 3 80 - 5 10 Million/uL    Hemoglobin 12 3 11 7 - 15 5 g/dL    HCT 36 9 35 0 - 45 0 %    MCV 82 0 80 0 - 100 0 fL    MCH 27 3 27 0 - 33 0 pg    MCHC 33 3 32 0 - 36 0 g/dL    RDW 14 0 11 0 - 15 0 %    Platelet Count 823 350 - 400 Thousand/uL    SL AMB MPV 8 9 7 5 - 12 5 fL    Neutrophils (Absolute) 5,271 1,500 - 7,800 cells/uL    Lymphocytes (Absolute) 1,281 850 - 3,900 cells/uL    Monocytes (Absolute) 441 200 - 950 cells/uL    Eosinophils (Absolute) 0 (L) 15 - 500 cells/uL    Basophils ABS 7 0 - 200 cells/uL    Neutrophils 75 3 %    Lymphocytes 18 3 %    Monocytes 6 3 %    Eosinophils 0 0 %    Basophils PCT 0 1 %   Cancer antigen 15-3    Collection Time: 06/10/23  8:35 AM   Result Value Ref Range    CA 15-3 17 <32 U/mL       Collection Time: 06/10/23  8:35 AM   Result Value Ref Range    Cancer Antigen (CA) 125 5 <35 U/mL   Cancer antigen 27 29    Collection Time: 06/10/23  8:35 AM   Result Value Ref Range    CA 27 29 27 <38 U/mL   Lipid panel    Collection Time: 06/10/23  8:39 AM   Result Value Ref Range    Total Cholesterol 135 <200 mg/dL    HDL 71 > OR = 50 mg/dL    Triglycerides 55 <150 mg/dL    LDL Calculated 50 mg/dL (calc)    Chol HDLC Ratio 1 9 <5 0 (calc)    Non-HDL Cholesterol 64 <130 mg/dL (calc)   TSH, 3rd generation with Free T4 reflex    Collection Time: 06/10/23  8:39 AM   Result Value Ref Range    TSH W/RFX TO FREE T4 0 64 0 40 - 4 50 mIU/L   Hemoglobin A1c (w/out EAG)    Collection Time: 06/10/23  8:39 AM   Result Value Ref Range    Hemoglobin A1C 5 8 (H) <5 7 % of total Hgb           Amy Gooden DO

## 2023-06-21 ENCOUNTER — TELEPHONE (OUTPATIENT)
Dept: HEMATOLOGY ONCOLOGY | Facility: CLINIC | Age: 63
End: 2023-06-21

## 2023-06-21 DIAGNOSIS — Z17.0 MALIGNANT NEOPLASM OF BREAST IN FEMALE, ESTROGEN RECEPTOR POSITIVE, UNSPECIFIED LATERALITY, UNSPECIFIED SITE OF BREAST (HCC): ICD-10-CM

## 2023-06-21 DIAGNOSIS — C50.919 MALIGNANT NEOPLASM OF BREAST IN FEMALE, ESTROGEN RECEPTOR POSITIVE, UNSPECIFIED LATERALITY, UNSPECIFIED SITE OF BREAST (HCC): ICD-10-CM

## 2023-06-21 NOTE — TELEPHONE ENCOUNTER
Patient Call    Who are you speaking with? Pharmacy    If it is not the patient, are they listed on an active communication consent form? N/A   What is the reason for this call? Gavino Berger from [facility] in stating that they are unable to fill the prescription for Letrozole as neither of the providers who authorized it are showing up as licensed in [place]  Gavino Berger states that for the medication to be filled they would need a doctor that is licensed in the state [facility] to send over the prescription  Does this require a call back? Yes   If a call back is required, please list best call back number [phone]  Reference # [id number]   If a call back is required, advise that a message will be forwarded to their care team and someone will return their call as soon as possible  Did you relay this information to the patient?  Yes

## 2023-06-22 RX ORDER — LETROZOLE 2.5 MG/1
2.5 TABLET, FILM COATED ORAL DAILY
Qty: 90 TABLET | Refills: 3 | Status: SHIPPED | OUTPATIENT
Start: 2023-06-22

## 2023-06-28 ENCOUNTER — TELEPHONE (OUTPATIENT)
Dept: FAMILY MEDICINE CLINIC | Facility: CLINIC | Age: 63
End: 2023-06-28

## 2023-06-28 NOTE — TELEPHONE ENCOUNTER
Pt was recently here and the dr Tariq Hodgkin her prescriptions but she just heard back from the Pharm[employer] company that two of her meds were denied and she does not know why, the Nebivolol 5mg and Rosuvastatin

## 2023-06-28 NOTE — TELEPHONE ENCOUNTER
Spoke with patient, informed her we have confirmation from the pharmacy for these medications  Patient will call the pharmacy and then her insurance company    DELFINA Dubose/JOSE

## 2023-08-17 DIAGNOSIS — E11.3293 TYPE 2 DIABETES MELLITUS WITH BOTH EYES AFFECTED BY MILD NONPROLIFERATIVE RETINOPATHY WITHOUT MACULAR EDEMA, WITHOUT LONG-TERM CURRENT USE OF INSULIN (HCC): ICD-10-CM

## 2023-08-17 RX ORDER — LANCETS
EACH MISCELLANEOUS
Qty: 102 EACH | Refills: 3 | Status: SHIPPED | OUTPATIENT
Start: 2023-08-17

## 2023-08-17 RX ORDER — BLOOD SUGAR DIAGNOSTIC
STRIP MISCELLANEOUS
Qty: 100 STRIP | Refills: 3 | Status: SHIPPED | OUTPATIENT
Start: 2023-08-17

## 2023-12-19 ENCOUNTER — TELEPHONE (OUTPATIENT)
Dept: FAMILY MEDICINE CLINIC | Facility: CLINIC | Age: 63
End: 2023-12-19

## 2023-12-19 NOTE — TELEPHONE ENCOUNTER
Left message. Dr Lombardi will not be in office 12/22. Second time trying to reach pt to reschedule 6m follow up

## 2024-04-30 ENCOUNTER — TELEPHONE (OUTPATIENT)
Dept: CARDIOLOGY CLINIC | Facility: CLINIC | Age: 64
End: 2024-04-30

## 2024-04-30 NOTE — TELEPHONE ENCOUNTER
I called this pt of Dr Damon's to schedule a one yr f/up and the pt said she has moved to [facility], not coming here anymore.

## 2024-07-08 ENCOUNTER — TELEPHONE (OUTPATIENT)
Age: 64
End: 2024-07-08

## 2024-07-08 DIAGNOSIS — E11.3293 TYPE 2 DIABETES MELLITUS WITH BOTH EYES AFFECTED BY MILD NONPROLIFERATIVE RETINOPATHY WITHOUT MACULAR EDEMA, WITHOUT LONG-TERM CURRENT USE OF INSULIN (HCC): ICD-10-CM

## 2024-07-08 DIAGNOSIS — E78.2 MIXED HYPERLIPIDEMIA: ICD-10-CM

## 2024-07-08 DIAGNOSIS — I10 BENIGN ESSENTIAL HYPERTENSION: ICD-10-CM

## 2024-07-08 RX ORDER — NEBIVOLOL 5 MG/1
5 TABLET ORAL DAILY
Qty: 90 TABLET | Refills: 0 | Status: SHIPPED | OUTPATIENT
Start: 2024-07-08

## 2024-07-08 RX ORDER — TELMISARTAN 40 MG/1
40 TABLET ORAL DAILY
Qty: 90 TABLET | Refills: 0 | Status: SHIPPED | OUTPATIENT
Start: 2024-07-08

## 2024-07-08 RX ORDER — SAXAGLIPTIN AND METFORMIN HYDROCHLORIDE 1000; 5 MG/1; MG/1
1 TABLET, FILM COATED, EXTENDED RELEASE ORAL DAILY
Qty: 90 TABLET | Refills: 0 | Status: SHIPPED | OUTPATIENT
Start: 2024-07-08

## 2024-07-08 RX ORDER — ROSUVASTATIN CALCIUM 10 MG/1
10 TABLET, COATED ORAL
Qty: 90 TABLET | Refills: 0 | Status: SHIPPED | OUTPATIENT
Start: 2024-07-08

## 2024-07-08 NOTE — TELEPHONE ENCOUNTER
Patient is calling in regarding medication refills.  She moved to [place] and is scheduled to see her new pcp on 8/29/24 and will need a refill of her all medications prior to appointment    All medication will run out in a few weeks before she is able to see new pcp    She wants to know if the doctor can fill all her medication and sent to [facility]

## 2024-07-11 DIAGNOSIS — Z17.0 MALIGNANT NEOPLASM OF BREAST IN FEMALE, ESTROGEN RECEPTOR POSITIVE, UNSPECIFIED LATERALITY, UNSPECIFIED SITE OF BREAST (HCC): ICD-10-CM

## 2024-07-11 DIAGNOSIS — C50.919 MALIGNANT NEOPLASM OF BREAST IN FEMALE, ESTROGEN RECEPTOR POSITIVE, UNSPECIFIED LATERALITY, UNSPECIFIED SITE OF BREAST (HCC): ICD-10-CM

## 2024-07-14 NOTE — TELEPHONE ENCOUNTER
07/14/24 7:47 AM        The office's request has been received, reviewed, and the patient chart updated. The PCP has successfully been removed with a patient attribution note. This message will now be completed.        Thank you  Jorge A Laguans

## 2024-07-15 ENCOUNTER — TELEPHONE (OUTPATIENT)
Age: 64
End: 2024-07-15

## 2024-07-15 ENCOUNTER — TELEPHONE (OUTPATIENT)
Dept: HEMATOLOGY ONCOLOGY | Facility: MEDICAL CENTER | Age: 64
End: 2024-07-15

## 2024-07-15 RX ORDER — LETROZOLE 2.5 MG/1
2.5 TABLET, FILM COATED ORAL DAILY
Qty: 90 TABLET | Refills: 0 | Status: SHIPPED | OUTPATIENT
Start: 2024-07-15

## 2024-07-15 NOTE — TELEPHONE ENCOUNTER
The patient called she wants to make sure you received the medical  record request from Dr Ivan or Dr Holbrook  they have sent the request 4 times    please call her with an update thank you

## 2024-07-15 NOTE — TELEPHONE ENCOUNTER
Patient called back to speak to Rama. Per patient she received a message that she has an appointment for 7/23 and she can not make that appointment due to living 5 hours away. Per patient she's been a patient for  for years and all she is asking for is a 90 days or 60 day supply to hold her over until she establish care.       Please call patient.       Thanks!

## 2024-07-15 NOTE — TELEPHONE ENCOUNTER
Pt called regarding the medication refill request. Pt reported that she now lives about 5 hours away and is in the process of establishing care with a hematologist where she lives now but her appt is not until September. She is requesting is Dr. Massey can order a 90 days or even a 60 day supply to hold her over until she establishes care. She is unable to make a follow up appt with Dr. Massey due to the commute. Can someone please give her a call with Dr. Snyder input on the situation.

## 2024-07-15 NOTE — TELEPHONE ENCOUNTER
Spoke with patient   Patient is transferring care to a provider closer to her home  Appt with PA cancelled  Patient aware that a 90 day supply of femara was sent to her pharmacy

## 2024-07-15 NOTE — TELEPHONE ENCOUNTER
Pt is aware we have received the request for medical records which will be sent out for copy today.

## 2024-07-15 NOTE — TELEPHONE ENCOUNTER
Voicemail left for patient with rescheduled appt   Patient instructed to call [facility] with conflict

## 2024-09-13 DIAGNOSIS — E11.3293 TYPE 2 DIABETES MELLITUS WITH BOTH EYES AFFECTED BY MILD NONPROLIFERATIVE RETINOPATHY WITHOUT MACULAR EDEMA, WITHOUT LONG-TERM CURRENT USE OF INSULIN (HCC): ICD-10-CM

## 2024-09-24 ENCOUNTER — TELEPHONE (OUTPATIENT)
Age: 64
End: 2024-09-24

## 2024-09-24 NOTE — TELEPHONE ENCOUNTER
Call from Saida. She had previously been a patient of Dr. Ernesto Massey but has not seen him recently as she has moved to [place]. She wanted to speak with him for his opinion of letrozole. Explained that Dr. Massey will not be back in the office until October. She stated that she would call back then. She had no other questions at this time.

## 2024-10-11 RX ORDER — BLOOD SUGAR DIAGNOSTIC
STRIP MISCELLANEOUS
Qty: 100 STRIP | Refills: 3 | Status: SHIPPED | OUTPATIENT
Start: 2024-10-11

## 2024-10-11 RX ORDER — LANCETS
EACH MISCELLANEOUS
Qty: 102 EACH | Refills: 3 | Status: SHIPPED | OUTPATIENT
Start: 2024-10-11

## 2024-10-22 ENCOUNTER — TELEPHONE (OUTPATIENT)
Age: 64
End: 2024-10-22

## 2024-10-22 NOTE — TELEPHONE ENCOUNTER
Patient calling in, states she moved to [facility] and is no longer in the area. Requesting records be released to new provider.     Advised pt fill out medical release form. Informed can fax to us at [phone] for can upload PDF file to mychart msg. Patient verbalized understanding.